# Patient Record
Sex: FEMALE | Race: WHITE | NOT HISPANIC OR LATINO | Employment: FULL TIME | URBAN - METROPOLITAN AREA
[De-identification: names, ages, dates, MRNs, and addresses within clinical notes are randomized per-mention and may not be internally consistent; named-entity substitution may affect disease eponyms.]

---

## 2018-02-19 DIAGNOSIS — Z32.01 PREGNANCY TEST POSITIVE: Primary | ICD-10-CM

## 2018-02-26 ENCOUNTER — NURSE TRIAGE (OUTPATIENT)
Dept: NURSING | Facility: CLINIC | Age: 23
End: 2018-02-26

## 2018-02-26 ENCOUNTER — HOSPITAL ENCOUNTER (EMERGENCY)
Facility: CLINIC | Age: 23
Discharge: HOME | End: 2018-02-26
Attending: EMERGENCY MEDICINE | Admitting: EMERGENCY MEDICINE
Payer: COMMERCIAL

## 2018-02-26 ENCOUNTER — APPOINTMENT (OUTPATIENT)
Dept: ULTRASOUND IMAGING | Facility: CLINIC | Age: 23
End: 2018-02-26
Attending: EMERGENCY MEDICINE
Payer: COMMERCIAL

## 2018-02-26 VITALS
DIASTOLIC BLOOD PRESSURE: 76 MMHG | TEMPERATURE: 98.2 F | RESPIRATION RATE: 12 BRPM | OXYGEN SATURATION: 98 % | HEART RATE: 100 BPM | SYSTOLIC BLOOD PRESSURE: 134 MMHG

## 2018-02-26 DIAGNOSIS — R10.32 ABDOMINAL PAIN, LEFT LOWER QUADRANT: ICD-10-CM

## 2018-02-26 DIAGNOSIS — Z34.91 INTRAUTERINE NORMAL PREGNANCY, FIRST TRIMESTER: ICD-10-CM

## 2018-02-26 DIAGNOSIS — R10.2 SUPRAPUBIC PAIN: Primary | ICD-10-CM

## 2018-02-26 LAB
ALBUMIN UR-MCNC: NEGATIVE MG/DL
APPEARANCE UR: CLEAR
BILIRUB UR QL STRIP: NEGATIVE
COLOR UR AUTO: YELLOW
GLUCOSE UR STRIP-MCNC: NEGATIVE MG/DL
HCG UR QL: POSITIVE
HGB UR QL STRIP: NEGATIVE
KETONES UR STRIP-MCNC: NEGATIVE MG/DL
LEUKOCYTE ESTERASE UR QL STRIP: NEGATIVE
MUCOUS THREADS #/AREA URNS LPF: PRESENT /LPF
NITRATE UR QL: NEGATIVE
PH UR STRIP: 7 PH (ref 5–7)
RBC #/AREA URNS AUTO: 1 /HPF (ref 0–2)
SOURCE: ABNORMAL
SP GR UR STRIP: 1.01 (ref 1–1.03)
SQUAMOUS #/AREA URNS AUTO: 19 /HPF (ref 0–1)
UROBILINOGEN UR STRIP-MCNC: 0 MG/DL (ref 0–2)
WBC #/AREA URNS AUTO: 1 /HPF (ref 0–2)

## 2018-02-26 PROCEDURE — 25000125 ZZHC RX 250: Performed by: EMERGENCY MEDICINE

## 2018-02-26 PROCEDURE — 81001 URINALYSIS AUTO W/SCOPE: CPT | Performed by: EMERGENCY MEDICINE

## 2018-02-26 PROCEDURE — 99284 EMERGENCY DEPT VISIT MOD MDM: CPT | Mod: 25

## 2018-02-26 PROCEDURE — 76801 OB US < 14 WKS SINGLE FETUS: CPT

## 2018-02-26 PROCEDURE — 81025 URINE PREGNANCY TEST: CPT | Performed by: EMERGENCY MEDICINE

## 2018-02-26 PROCEDURE — 25000132 ZZH RX MED GY IP 250 OP 250 PS 637: Performed by: EMERGENCY MEDICINE

## 2018-02-26 RX ORDER — ACETAMINOPHEN 325 MG/1
650 TABLET ORAL ONCE
Status: COMPLETED | OUTPATIENT
Start: 2018-02-26 | End: 2018-02-26

## 2018-02-26 RX ORDER — ONDANSETRON 4 MG/1
4 TABLET, ORALLY DISINTEGRATING ORAL ONCE
Status: COMPLETED | OUTPATIENT
Start: 2018-02-26 | End: 2018-02-26

## 2018-02-26 RX ORDER — ONDANSETRON 4 MG/1
4 TABLET, ORALLY DISINTEGRATING ORAL EVERY 8 HOURS PRN
Qty: 10 TABLET | Refills: 0 | Status: SHIPPED | OUTPATIENT
Start: 2018-02-26 | End: 2018-03-02

## 2018-02-26 RX ADMIN — ACETAMINOPHEN 650 MG: 325 TABLET, FILM COATED ORAL at 08:55

## 2018-02-26 RX ADMIN — ONDANSETRON 4 MG: 4 TABLET, ORALLY DISINTEGRATING ORAL at 08:55

## 2018-02-26 ASSESSMENT — ENCOUNTER SYMPTOMS
NAUSEA: 1
APPETITE CHANGE: 0
FEVER: 0
VOMITING: 1
ABDOMINAL PAIN: 1
DYSURIA: 0

## 2018-02-26 NOTE — TELEPHONE ENCOUNTER
"Echo 8 weeks and 2 days pregnant, history of right sided ovarian cysts.  For past 2 days, having left sided abdominal pain which feels similar to previous ovarian cysts.  Pain persistent, located \"right above hip bone\" and described as \"stabbing\" pain but has remained persistent over past two days.  Pain present for 20-30 minutes, and is usually a 10 minute reprieve of pain.  Nothing brings it on, and feels slightly better with her warm hand applied to area.  Has not yet had 1st OB appointment, though is scheduled.      Reason for Disposition    MODERATE-SEVERE abdominal pain (e.g., interferes with normal activities, awakens from sleep)    Protocols used: PREGNANCY - ABDOMINAL PAIN LESS THAN 20 WEEKS EGA-ADULT-    "

## 2018-02-26 NOTE — ED PROVIDER NOTES
"  History     Chief Complaint:  Abdominal Pain    HPI   Echo Mcgovern is a  pregnant 22 year old female who presents with constant left abdominal pain that started 2 nights ago. The patient is around 8 weeks pregnant based on her last period. This is her second pregnancy with no history of prior miscarriages or abortions. The patient reports that her side pain feels \"exactly the same\" to previous ovarian cysts. Associated symptoms include nausea and vomiting. The patient denies history of kidney stones as well as recent vaginal bleeding or discharge, dysuria, appetite change, fevers and other injuries.    Allergies:  No known drug allergies.     Medications:    The patient is not currently taking any prescribed medications.    Past Medical History:    The patient denies any significant past medical history.    Past Surgical History:    The patient does not have any pertinent past surgical history.    Family History:    No past pertinent family history.    Social History:  No past pertinent social history.  Marital Status:        Review of Systems   Constitutional: Negative for appetite change and fever.   Gastrointestinal: Positive for abdominal pain, nausea and vomiting.   Genitourinary: Negative for dysuria, vaginal bleeding and vaginal discharge.   All other systems reviewed and are negative.      Physical Exam     Patient Vitals for the past 24 hrs:   BP Temp Temp src Pulse Heart Rate Resp SpO2   18 1045 134/76 - - - 95 12 98 %   18 0836 140/85 98.2  F (36.8  C) Oral 100 - 20 99 %       Physical Exam  General: Alert, appears well-developed and well-nourished. Cooperative.     In mild distress  HEENT:  Head:  Atraumatic  Ears:  External ears are normal  Mouth/Throat:  Oropharynx is without erythema or exudate and mucous membranes are moist.   Eyes:   Conjunctivae normal and EOM are normal. No scleral icterus.  CV:  Normal rate, regular rhythm, normal heart sounds and radial pulses are 2+ " and symmetric.  No murmur.  Resp:  Breath sounds are clear bilaterally    Non-labored, no retractions or accessory muscle use  GI:  Abdomen is soft, no distension, left sided suprapubic tenderness.No rebound or guarding.  No CVA tenderness bilaterally  MS:  Normal range of motion. No edema.    Normal strength in all 4 extremities.     Back atraumatic.    No midline cervical, thoracic, or lumbar tenderness  Skin:  Warm and dry.  No rash or lesions noted.  Neuro: Alert. Normal strength.  GCS: 15  Psych:  Normal mood and affect.      Emergency Department Course     Imaging:  US OB <14 Weeks Single  1. Single live intrauterine pregnancy of estimated gestational age 8 weeks 5 days.  2. No cause for left-sided pain is identified.  As per radiology.     Laboratory:  UA with micro: squamous epithelial 19 (H), mucous present o/w negative  HCG Qualitative Urine: Positive    Interventions:  0855 Tylenol 650 mg PO   Zofran 4 mg PO    Emergency Department Course:  Nursing notes and vitals reviewed.  0835 I performed an exam of the patient as documented above.  Oral medications were administered as documented above.  The patient provided a urine sample here in the emergency department. This was sent for laboratory testing, findings above.   The patient was sent for a OB US while in the emergency department, findings above.     1018 I reevaluated the patient and provided an update in regards to her ED course.      I personally reviewed the laboratory results with the patient and answered all related questions prior to discharge.   Findings and plan explained to the patient. Patient discharged home with instructions regarding supportive care, medications, and reasons to return. The importance of close follow-up was reviewed.       Impression & Plan      Medical Decision Making:  Echo Mcgovern is a 22 year old female  who presents with left lower quadrant abdominal pain since Saturday. The patient is with no vaginal bleeding  or discharge, no pain with urination and no history of kidney stones. The patient's urine appears negative for UTI or pyelonephritis. UPT is positive. She is approximately 8 weeks based on her last menstrual period. We did obtain an ultrasound to evaluate intrauterine pregnancy. There is a single live intrauterine pregnancy estimated at 8 weeks and 5 days. There is no evidence of torsions or cyst of left ovary.  No clear etiology for left sided abd pain. I suspect this is likely discomfort persistent with her pregnancy.  No obvious free fluid concerning for other sinister intra-abdominal pathology at this time. The patient will follow up with OB GYN for her initial visit. She was counseled to continue with prenatal vitamins. She was given a prescription of Zofran as needed. She was tolerating oral intake prior to discharge. She felt comfortable with her plan for outpatient follow up. We did not perform laboratory work up at this time as she does appear to be doing well and was reassured by ultrasound findings here.  Tylenol alone improved the pain as well as zofran. There is low suspicion for other sinister intraabdominal pathology such as appendicitis and cholecystitis at this time. The patient felt comfortable for discharge and all questions were answered. Return precautions were given.     Diagnosis:    ICD-10-CM    1. Suprapubic pain R10.2 UA with Microscopic     HCG qualitative urine (UPT)   2. Abdominal pain, left lower quadrant R10.32    3. Intrauterine normal pregnancy, first trimester Z34.91        Disposition:  Discharged.    Discharge Medications:  Discharge Medication List as of 2/26/2018 10:42 AM      START taking these medications    Details   ondansetron (ZOFRAN ODT) 4 MG ODT tab Take 1 tablet (4 mg) by mouth every 8 hours as needed for nausea, Disp-10 tablet, R-0, Local Print           Scribe Discloser:  Jack GORDON, am serving as a scribe on 2/26/2018 at 8:35 AM to personally document services  performed by Matthew Brito MD based on my observations and the provider's statements to me.      2/26/2018   [facility] EMERGENCY DEPARTMENT       Matthew Brito MD  02/26/18 [id number]

## 2018-02-26 NOTE — ED AVS SNAPSHOT
[facility] Emergency Department    [address]    [facility] [address]    Phone:  [phone]    Fax:  [phone]                                       Echo Mcgovern   MRN: [id number]    Department:  [facility] Emergency Department   Date of Visit:  2/26/2018           After Visit Summary Signature Page     I have received my discharge instructions, and my questions have been answered. I have discussed any challenges I see with this plan with the nurse or doctor.    ..........................................................................................................................................  Patient/Patient Representative Signature      ..........................................................................................................................................  Patient Representative Print Name and Relationship to Patient    ..................................................               ................................................  Date                                            Time    ..........................................................................................................................................  Reviewed by Signature/Title    ...................................................              ..............................................  Date                                                            Time

## 2018-02-26 NOTE — ED AVS SNAPSHOT
[facility] Emergency Department    [address]    [facility] [address]    Phone:  [phone]    Fax:  [phone]                                       Echo Mcgovern   MRN: [id number]    Department:  [facility] Emergency Department   Date of Visit:  2/26/2018           Patient Information     Date Of Birth          1995        Your diagnoses for this visit were:     Suprapubic pain     Abdominal pain, left lower quadrant     Intrauterine normal pregnancy, first trimester        You were seen by Matthew Brito MD.      Follow-up Information     Go to Your OBGYN.    Why:  later this week at already scheduled appointment        Follow up with [facility] Emergency Department.    Specialty:  EMERGENCY MEDICINE    Why:  If symptoms worsen    Contact information:    [address]  [facility] [id number] [phone]        Discharge Instructions       Discharge Instructions  Abdominal Pain    Abdominal pain (belly pain) can be caused by many things. Your evaluation today does not show the exact cause for your pain. Your provider today has decided that it is unlikely your pain is due to a life threatening problem, or a problem requiring surgery or hospital admission. Sometimes those problems cannot be found right away, so it is very important that you follow up as directed.  Sometimes only the changes which occur over time allow the cause of your pain to be found.    Generally, every Emergency Department visit should have a follow-up clinic visit with either a primary or a specialty clinic/provider. Please follow-up as instructed by your emergency provider today. With abdominal pain, we often recommend very close follow-up, such as the following day.    ADULTS:  Return to the Emergency Department right away if:      You get an oral temperature above 102oF or as directed by your provider.    You have blood in your stools. This may be bright red  or appear as black, tarry stools.      You keep vomiting (throwing up) or cannot drink liquids.    You see blood when you vomit.     You cannot have a bowel movement or you cannot pass gas.    Your stomach gets bloated or bigger.    Your skin or the whites of your eyes look yellow.    You faint.    You have bloody, frequent or painful urination (peeing).    You have new symptoms or anything that worries you.    CHILDREN:  Return to the Emergency Department right away if your child has any of the above-listed symptoms or the following:      Pushes your hand away or screams/cries when his/her belly is touched.    You notice your child is very fussy or weak.    Your child is very tired and is too tired to eat or drink.    Your child is dehydrated.  Signs of dehydration can be:  o Significant change in the amount of wet diapers/urine.  o Your infant or child starts to have dry mouth and lips, or no saliva (spit) or tears.    PREGNANT WOMEN:  Return to the Emergency Department right away if you have any of the above-listed symptoms or the following:      You have bleeding, leaking fluid or passing tissue from the vagina.    You have worse pain or cramping, or pain in your shoulder or back.    You have vomiting that will not stop.    You have a temperature of 100oF or more.    Your baby is not moving as much as usual.    You faint.    You get a bad headache with or without eye problems and abdominal pain.    You have a seizure.    You have unusual discharge from your vagina and abdominal pain.    Abdominal pain is pretty common during pregnancy.  Your pain may or may not be related to your pregnancy. You should follow-up closely with your OB provider so they can evaluate you and your baby.  Until you follow-up with your regular provider, do the following:       Avoid sex and do not put anything in your vagina.    Drink clear fluids.    Only take medications approved by your provider.    MORE INFORMATION:    Appendicitis:   "A possible cause of abdominal pain in any person who still has their appendix is acute appendicitis. Appendicitis is often hard to diagnose.  Testing does not always rule out early appendicitis or other causes of abdominal pain. Close follow-up with your provider and re-evaluations may be needed to figure out the reason for your abdominal pain.    Follow-up:  It is very important that you make an appointment with your clinic and go to the appointment.  If you do not follow-up with your primary provider, it may result in missing an important development which could result in permanent injury or disability and/or lasting pain.  If there is any problem keeping your appointment, call your provider or return to the Emergency Department.    Medications:  Take your medications as directed by your provider today.  Before using over-the-counter medications, ask your provider and make sure to take the medications as directed.  If you have any questions about medications, ask your provider.    Diet:  Resume your normal diet as much as possible, but do not eat fried, fatty or spicy foods while you have pain.  Do not drink alcohol or have caffeine.  Do not smoke tobacco.    Probiotics: If you have been given an antibiotic, you may want to also take a probiotic pill or eat yogurt with live cultures. Probiotics have \"good bacteria\" to help your intestines stay healthy. Studies have shown that probiotics help prevent diarrhea (loose stools) and other intestine problems (including C. diff infection) when you take antibiotics. You can buy these without a prescription in the pharmacy section of the store.     If you were given a prescription for medicine here today, be sure to read all of the information (including the package insert) that comes with your prescription.  This will include important information about the medicine, its side effects, and any warnings that you need to know about.  The pharmacist who fills the prescription " can provide more information and answer questions you may have about the medicine.  If you have questions or concerns that the pharmacist cannot address, please call or return to the Emergency Department.       Remember that you can always come back to the Emergency Department if you are not able to see your regular provider in the amount of time listed above, if you get any new symptoms, or if there is anything that worries you.      Future Appointments        Provider Department Dept Phone Center    3/1/2018 9:00 AM [facility] [phone] [place]    3/2/2018 2:00 PM Jeri Vergara,  [facility] [phone] [place]      24 Hour Appointment Hotline       To make an appointment at any [facility], call [phone] ([phone]). If you don't have a family doctor or clinic, we will help you find one. [facility] are conveniently located to serve the needs of you and your family.             Review of your medicines      START taking        Dose / Directions Last dose taken    ondansetron 4 MG ODT tab   Commonly known as:  ZOFRAN ODT   Dose:  4 mg   Quantity:  10 tablet        Take 1 tablet (4 mg) by mouth every 8 hours as needed for nausea   Refills:  0                Prescriptions were sent or printed at these locations (1 Prescription)                   Other Prescriptions                Printed at Department/Unit printer (1 of 1)         ondansetron (ZOFRAN ODT) 4 MG ODT tab                Procedures and tests performed during your visit     HCG qualitative urine (UPT)    UA with Microscopic    US OB < 14 Weeks Single      Orders Needing Specimen Collection     None      Pending Results     No orders found from 2/24/2018 to 2/27/2018.            Pending Culture Results     No orders found from 2/24/2018 to 2/27/2018.            Pending Results Instructions     If you had any lab results that were not finalized at the time of your Discharge, you can  call the ED Lab Result RN at [phone]. You will be contacted by this team for any positive Lab results or changes in treatment. The nurses are available 7 days a week from 10A to 6:30P.  You can leave a message 24 hours per day and they will return your call.        Test Results From Your Hospital Stay        2/26/2018  9:53 AM      Component Results     Component Value Ref Range & Units Status    Color Urine Yellow  Final    Appearance Urine Clear  Final    Glucose Urine Negative NEG^Negative mg/dL Final    Bilirubin Urine Negative NEG^Negative Final    Ketones Urine Negative NEG^Negative mg/dL Final    Specific Gravity Urine 1.008 1.003 - 1.035 Final    Blood Urine Negative NEG^Negative Final    pH Urine 7.0 5.0 - 7.0 pH Final    Protein Albumin Urine Negative NEG^Negative mg/dL Final    Urobilinogen mg/dL 0.0 0.0 - 2.0 mg/dL Final    Nitrite Urine Negative NEG^Negative Final    Leukocyte Esterase Urine Negative NEG^Negative Final    Source Midstream Urine  Final    WBC Urine 1 0 - 2 /HPF Final    RBC Urine 1 0 - 2 /HPF Final    Squamous Epithelial /HPF Urine 19 (H) 0 - 1 /HPF Final    Mucous Urine Present (A) NEG^Negative /LPF Final         2/26/2018  9:53 AM      Component Results     Component Value Ref Range & Units Status    HCG Qual Urine Positive (A) NEG^Negative Final    This test is for screening purposes.  Results should be interpreted along with   the clinical picture.  Confirmation testing is available if warranted by   ordering [id number], HCG Quantitative Pregnancy.                 2/26/2018  9:45 AM      Narrative     ULTRASOUND OB LESS THAN 14 WEEKS SINGLE  2/26/2018 9:25 AM    HISTORY: Left suprapubic pain during early pregnancy.    TECHNIQUE: Transabdominal imaging only was performed.    COMPARISON:  None.    FINDINGS:      Estimated gestational age by current ultrasound measurement: 8 weeks 5  days.  Estimated date of delivery based on this ultrasound: 10/3/2018.  Patient reported LMP:  12/28/2017.  Estimated gestational age by reported LMP: 8 weeks 4 days.    Crown-rump length: 2.1 cm.   Embryonic cardiac activity: 190 bpm.   Yolk sac: Present.  Subchorionic hemorrhage: None.    Right ovary: Unremarkable.  Left ovary: Unremarkable.  Adnexal mass: None.  Free pelvic fluid: None.    Additional ultrasound scanning through the region of the patient's  pain to the left of the umbilicus demonstrates no sonographic  abnormalities.        Impression     IMPRESSION:   1. Single live intrauterine pregnancy of estimated gestational age 8  weeks 5 days.  2. No cause for left-sided pain is identified.    GENEVIEVE AQUINO MD                Clinical Quality Measure: Blood Pressure Screening     Your blood pressure was checked while you were in the emergency department today. The last reading we obtained was  BP: 140/85 . Please read the guidelines below about what these numbers mean and what you should do about them.  If your systolic blood pressure (the top number) is less than 120 and your diastolic blood pressure (the bottom number) is less than 80, then your blood pressure is normal. There is nothing more that you need to do about it.  If your systolic blood pressure (the top number) is 120-139 or your diastolic blood pressure (the bottom number) is 80-89, your blood pressure may be higher than it should be. You should have your blood pressure rechecked within a year by a primary care provider.  If your systolic blood pressure (the top number) is 140 or greater or your diastolic blood pressure (the bottom number) is 90 or greater, you may have high blood pressure. High blood pressure is treatable, but if left untreated over time it can put you at risk for heart attack, stroke, or kidney failure. You should have your blood pressure rechecked by a primary care provider within the next 4 weeks.  If your provider in the emergency department today gave you specific instructions to follow-up with your doctor or  "provider even sooner than that, you should follow that instruction and not wait for up to 4 weeks for your follow-up visit.        Thank you for choosing [facility]       Thank you for choosing [facility] for your care. Our goal is always to provide you with excellent care. Hearing back from our patients is one way we can continue to improve our services. Please take a few minutes to complete the written survey that you may receive in the mail after you visit with us. Thank you!        [email or web address] Information     [employer] lets you send messages to your doctor, view your test results, renew your prescriptions, schedule appointments and more. To sign up, go to [email or web address] . Click on \"Log in\" on the left side of the screen, which will take you to the Welcome page. Then click on \"Sign up Now\" on the right side of the page.     You will be asked to enter the access code listed below, as well as some personal information. Please follow the directions to create your username and password.     Your access code is: [email or web address]  Expires: 2018 10:42 AM     Your access code will  in 90 days. If you need help or a new code, please call your [facility] clinic or [phone].        Care EveryWhere ID     This is your Care EveryWhere ID. This could be used by other organizations to access your [facility] medical records  [id number]        Equal Access to Services     AISSATOU OATES AH: Hadii miranda Cruz, waaxda lumaria fernanda, qaybta kaalcyndi roberto. So [facility] [phone].    ATENCIÓN: Si habla español, tiene a pickering disposición servicios gratuitos de asistencia lingüística. Susie al [phone].    We comply with applicable federal civil rights laws and [place] laws. We do not discriminate on the basis of race, color, national origin, age, disability, sex, sexual orientation, or gender identity.            After Visit Summary       This is your record. Keep this with " you and show to your community pharmacist(s) and doctor(s) at your next visit.

## 2018-03-01 ENCOUNTER — HOSPITAL ENCOUNTER (OUTPATIENT)
Dept: ULTRASOUND IMAGING | Facility: CLINIC | Age: 23
Discharge: HOME | End: 2018-03-01
Attending: OBSTETRICS & GYNECOLOGY | Admitting: OBSTETRICS & GYNECOLOGY
Payer: COMMERCIAL

## 2018-03-01 ENCOUNTER — PRENATAL OFFICE VISIT (OUTPATIENT)
Dept: NURSING | Facility: CLINIC | Age: 23
End: 2018-03-01
Payer: COMMERCIAL

## 2018-03-01 DIAGNOSIS — Z32.01 PREGNANCY TEST POSITIVE: ICD-10-CM

## 2018-03-01 DIAGNOSIS — Z32.01 PREGNANCY TEST POSITIVE: Primary | ICD-10-CM

## 2018-03-01 LAB
ABO + RH BLD: NORMAL
ABO + RH BLD: NORMAL
BETA HCG QUAL IFA URINE: POSITIVE
BLD GP AB SCN SERPL QL: NORMAL
BLOOD BANK CMNT PATIENT-IMP: NORMAL
ERYTHROCYTE [DISTWIDTH] IN BLOOD BY AUTOMATED COUNT: 12.6 % (ref 10–15)
HCT VFR BLD AUTO: 39.1 % (ref 35–47)
HGB BLD-MCNC: 13.1 G/DL (ref 11.7–15.7)
MCH RBC QN AUTO: 31.1 PG (ref 26.5–33)
MCHC RBC AUTO-ENTMCNC: 33.5 G/DL (ref 31.5–36.5)
MCV RBC AUTO: 93 FL (ref 78–100)
PLATELET # BLD AUTO: 225 10E9/L (ref 150–450)
RBC # BLD AUTO: 4.21 10E12/L (ref 3.8–5.2)
SPECIMEN EXP DATE BLD: NORMAL
WBC # BLD AUTO: 7.4 10E9/L (ref 4–11)

## 2018-03-01 PROCEDURE — 99207 ZZC NO CHARGE NURSE ONLY: CPT

## 2018-03-01 PROCEDURE — 76801 OB US < 14 WKS SINGLE FETUS: CPT

## 2018-03-01 PROCEDURE — 87389 HIV-1 AG W/HIV-1&-2 AB AG IA: CPT | Performed by: OBSTETRICS & GYNECOLOGY

## 2018-03-01 PROCEDURE — 85027 COMPLETE CBC AUTOMATED: CPT | Performed by: OBSTETRICS & GYNECOLOGY

## 2018-03-01 PROCEDURE — 84703 CHORIONIC GONADOTROPIN ASSAY: CPT | Performed by: OBSTETRICS & GYNECOLOGY

## 2018-03-01 PROCEDURE — 86762 RUBELLA ANTIBODY: CPT | Performed by: OBSTETRICS & GYNECOLOGY

## 2018-03-01 PROCEDURE — 86780 TREPONEMA PALLIDUM: CPT | Performed by: OBSTETRICS & GYNECOLOGY

## 2018-03-01 PROCEDURE — 86900 BLOOD TYPING SEROLOGIC ABO: CPT | Performed by: OBSTETRICS & GYNECOLOGY

## 2018-03-01 PROCEDURE — 87340 HEPATITIS B SURFACE AG IA: CPT | Performed by: OBSTETRICS & GYNECOLOGY

## 2018-03-01 PROCEDURE — 86901 BLOOD TYPING SEROLOGIC RH(D): CPT | Performed by: OBSTETRICS & GYNECOLOGY

## 2018-03-01 PROCEDURE — 86850 RBC ANTIBODY SCREEN: CPT | Performed by: OBSTETRICS & GYNECOLOGY

## 2018-03-01 PROCEDURE — 36415 COLL VENOUS BLD VENIPUNCTURE: CPT | Performed by: OBSTETRICS & GYNECOLOGY

## 2018-03-01 PROCEDURE — 87086 URINE CULTURE/COLONY COUNT: CPT | Performed by: OBSTETRICS & GYNECOLOGY

## 2018-03-01 RX ORDER — PRENATAL VIT/IRON FUM/FOLIC AC 27MG-0.8MG
1 TABLET ORAL DAILY
Qty: 100 TABLET | Refills: 3 | COMMUNITY
Start: 2018-03-01 | End: 2018-11-27

## 2018-03-01 NOTE — MR AVS SNAPSHOT
After Visit Summary   3/1/2018    Echo Mcgovern    MRN: [id number]           Patient Information     Date Of Birth          1995        Visit Information        Provider Department      3/1/2018 9:00 AM RI PRENATAL NURSE [facility]        Today's Diagnoses     Pregnancy test positive    -  1       Follow-ups after your visit        Your next 10 appointments already scheduled     Mar 01, 2018  3:45 PM CST   US OB < 14 WEEKS WITH TRANSVAGINAL SINGLE with [id number]   [facility] ([facility])    [address]  [facility] [id number] [phone]           Please bring a list of your medicines (including vitamins, minerals and over-the-counter drugs). Also, tell your doctor about any allergies you may have. Wear comfortable clothes and leave your valuables at home.  If you re less than 20 weeks drink four 8-ounce glasses of fluid an hour before your exam. If you need to empty your bladder before your exam, try to release only a little urine. Then, drink another glass of fluid.  You may have up to two family members in the exam room. If you bring a small child, an adult must be there to care for him or her.  Please call the Imaging Department at your exam site with any questions.            Mar 02, 2018  2:00 PM CST   [facility] Prenatal with Jeri Vergara,    [facility]    [address] [place]  [facility] [id number] [phone]              Future tests that were ordered for you today     Open Future Orders        Priority Expected Expires Ordered    US OB <14 Weeks w Transvaginal Single Routine  3/1/2019 3/1/2018            Who to contact     If you have questions or need follow up information about today's clinic visit or your schedule please contact [facility] directly at [phone].  Normal or non-critical lab and imaging results will be  communicated to you by Interfoliohart, letter or phone within 4 business days after the clinic has received the results. If you do not hear from us within 7 days, please contact the clinic through [employer] or phone. If you have a critical or abnormal lab result, we will notify you by phone as soon as possible.  Submit refill requests through [employer] or call your pharmacy and they will forward the refill request to us. Please allow 3 business days for your refill to be completed.          Additional Information About Your Visit        Game [employer] Hockey Information     [employer] gives you secure access to your electronic health record. If you see a primary care provider, you can also send messages to your care team and make appointments. If you have questions, please call your primary care clinic.  If you do not have a primary care provider, please call [phone] and they will assist you.        Care EveryWhere ID     This is your Care EveryWhere ID. This could be used by other organizations to access your [place] medical records  [id number]        Your Vitals Were     Last Period                   12/29/2017            Blood Pressure from Last 3 Encounters:   02/26/18 134/76    Weight from Last 3 Encounters:   No data found for Wt              We Performed the Following     ABO/Rh type and screen     Anti Treponema     Beta HCG qual IFA urine - [facility] and [place]     CBC with platelets     Hepatitis B surface antigen     HIV Antigen Antibody Combo     Rubella Antibody IgG Quantitative     Urine Culture Aerobic Bacterial        Primary Care Provider Fax #    Physician No Ref-Primary [phone]       No address on file        Equal Access to Services     AISSATOU OATES : Hadii miranda Cruz, wachinoda luemmaadaha, qaybta kaalmada cyndi simms . So [facility] [phone].    ATENCIÓN: Si habla español, tiene a pickering disposición servicios gratuitos de asistencia lingüística. Llame al  [phone].    We comply with applicable federal civil rights laws and [place] laws. We do not discriminate on the basis of race, color, national origin, age, disability, sex, sexual orientation, or gender identity.            Thank you!     Thank you for choosing [facility]  for your care. Our goal is always to provide you with excellent care. Hearing back from our patients is one way we can continue to improve our services. Please take a few minutes to complete the written survey that you may receive in the mail after your visit with us. Thank you!             Your Updated Medication List - Protect others around you: Learn how to safely use, store and throw away your medicines at www.disposemymeds.org.          This list is accurate as of 3/1/18 11:44 AM.  Always use your most recent med list.                   Brand Name Dispense Instructions for use Diagnosis    ondansetron 4 MG ODT tab    ZOFRAN ODT    10 tablet    Take 1 tablet (4 mg) by mouth every 8 hours as needed for nausea        prenatal multivitamin plus iron 27-0.8 MG Tabs per tablet     100 tablet    Take 1 tablet by mouth daily

## 2018-03-01 NOTE — NURSING NOTE
NPN nurse visit. 1st dr visit scheduled for 3/2/18 with Jeri Vergara D.O. Pap due. Last pap unsure.  8w6d.   We received records from pts previous pregnancy. I will attach them to the questionnaires so they can be reviewed by the md. Please send them to be scanned into epic once we are done with them.       MADISON Huerta RN

## 2018-03-02 ENCOUNTER — PRENATAL OFFICE VISIT (OUTPATIENT)
Dept: OBGYN | Facility: CLINIC | Age: 23
End: 2018-03-02
Payer: COMMERCIAL

## 2018-03-02 VITALS
DIASTOLIC BLOOD PRESSURE: 62 MMHG | WEIGHT: 148.7 LBS | HEART RATE: 70 BPM | BODY MASS INDEX: 25.39 KG/M2 | HEIGHT: 64 IN | SYSTOLIC BLOOD PRESSURE: 110 MMHG

## 2018-03-02 DIAGNOSIS — Z34.81 ENCOUNTER FOR SUPERVISION OF OTHER NORMAL PREGNANCY IN FIRST TRIMESTER: ICD-10-CM

## 2018-03-02 DIAGNOSIS — Z11.3 SCREEN FOR STD (SEXUALLY TRANSMITTED DISEASE): ICD-10-CM

## 2018-03-02 DIAGNOSIS — O09.299 H/O PRE-ECLAMPSIA IN PRIOR PREGNANCY, CURRENTLY PREGNANT: Primary | ICD-10-CM

## 2018-03-02 LAB
BACTERIA SPEC CULT: NORMAL
HBV SURFACE AG SERPL QL IA: NONREACTIVE
HIV 1+2 AB+HIV1 P24 AG SERPL QL IA: NONREACTIVE
RUBV IGG SERPL IA-ACNC: 21 IU/ML
SPECIMEN SOURCE: NORMAL
T PALLIDUM IGG+IGM SER QL: NEGATIVE

## 2018-03-02 PROCEDURE — 99207 ZZC FIRST OB VISIT: CPT | Performed by: OBSTETRICS & GYNECOLOGY

## 2018-03-02 NOTE — PROGRESS NOTES
"Initial Obstetrics Visit    Subjective: 22 year old  at 9w0d dated by LMP confirmed by early ultrasound (8 weeks) here today for initial OB visit. Patient reports No Problems. Denies cramping and vaginal spotting.     OB History:   H/o IOL for preeclampsia @ 37 weeks. Declines h/o treatment with magnesium. Records reviewed. Vaginal delivery, Apgars 8 & 9, 5lb 8oz.    Gyn History:   Menses: LMP: Patient's last menstrual period was 2017. frequency: q month  Sexually transmitted disease history: none.    Exercise: previous weight lifting, no longer lifting but exercising regularly   Diet: well rounded  Immunizations: Received flu   H/o Chicken Pox or Varicella Vaccination: Yes    Past Medical History:   Diagnosis Date     H/O pre-eclampsia in prior pregnancy, currently pregnant      Past Surgical History:   Procedure Laterality Date     HAND SURGERY  2015    right hand- fractured hand, had 2 screws placed     KNEE SURGERY  2011    Torn ACL, Left knee     family history is not on file.  Social History     Social History     Marital status:      Spouse name: Ministerio     Number of children: 1     Years of education: N/A     Occupational History     retail      Social History Main Topics     Smoking status: Never Smoker     Smokeless tobacco: Never Used     Alcohol use No     Drug use: No     Sexual activity: Yes     Partners: Male     Other Topics Concern     Not on file     Social History Narrative     Review of patient's allergies indicates not on file.    Current Outpatient Prescriptions on File Prior to Visit:  Prenatal Vit-Fe Fumarate-FA (PRENATAL MULTIVITAMIN PLUS IRON) 27-0.8 MG TABS per tablet Take 1 tablet by mouth daily     No current facility-administered medications on file prior to visit.      Review Of Systems: Negative except as mentioned in HPI    Exam:  Vitals: /62 (BP Location: Left arm, Patient Position: Chair, Cuff Size: Adult Regular)  Pulse 70  Ht 5' 4\" (1.626 m)  Wt " 148 lb 11.2 oz (67.4 kg)  LMP 2017  BMI 25.52 kg/m2  BMI= Body mass index is 25.52 kg/(m^2).    Constitutional: Healthy appearing  female. No acute distress.  HEENT: Normal appearance. Neck supple, thyroid normal in size without nodularity or masses.  Cardiovascular: Regular rate and rhythm without murmurs, clicks, gallops or rub.  Respiratory: Clear to auscultation bilaterally without crackles or wheeze.  Breast Exam: No visible masses or suspicious skin changes.  No discrete or dominant masses to palpation.  No axillary lymphadenopathy.  Gastrointestinal: Abdomen soft, non-tender. BS normal. No masses or organomegaly.  Pelvic Exam -    Vulva: Normal genitalia   Vagina: Normal mucosa, no abnormal discharge   Cervix: Parous, closed, mobile, no discharge, GC/Chlamydia obtained   Uterus: mildly enlarged, Normal shape, position and consistency   Adnexa: No masses, nodularity, tenderness  Skin: No suspicious lesions or rashes  Psychiatric: Mentation appears normal and affect normal      Lab Results   Component Value Date    HGB 13.1 2018       Recent Labs   Lab Test  18   0949   ABO  A   RH  Pos   AS  Neg     Rhogam not indicated     Recent Labs   Lab Test  18   0949   HEPBANG  Nonreactive   HIAGAB  Nonreactive   RUQIGG  21         Assessment: 22 year old  at 9w0d here today for initial prenatal visit. Doing well overall.       1. Encounter for supervision of other normal pregnancy in first trimester  - received flu vaccination (through ), Tdap @ 27 weeks  - desires Progenity testing with genetic panel next visit    2. H/O pre-eclampsia (mild) in prior pregnancy, currently pregnant  - Previous IOL @ 37 weeks, [place]. Treated with Procardia 30mg daily post partum x 1 week.   - Start ASA 81mg @ 12 weeks, continue through 36 weeks  - Obtain baseline labs future visit    3. Screen for STD (sexually transmitted disease)  - NEISSERIA GONORRHOEA PCR  - CHLAMYDIA TRACHOMATIS  PCR    Additional Plan:  - Labs: Prenatal labs reviewed. GC/Chlam collected.  Will obtain records. Negative per patient, 2/2016.    - Prenatal testing: Discussed options for screening for and diagnosis of chromosomal anomalies, including first trimester screen, noninvasive prenatal testing/cell-free fetal DNA testing, CVS/amniocentesis, quad screen, and ultrasound or comprehensive Level II US at 18-20 weeks. She is electing cell-free DNA testing.    - Education: Reviewed early pregnancy education, diet, exercise, prenatal vitamins, intercourse. Reviewed the call schedule, labor and delivery, and the schedule of prenatal visits. We also discussed: Vitamins / Minerals (see the F[facility] book, page: 7); Exercise, diet.    - Other/Follow-up: Follow up in 4 weeks.  Normal exercise.  Bedrest.  Normal sexual activity.  Prenatal vitamins..         Jeri Vergara, DO  Obstetrics and Gynecology

## 2018-03-02 NOTE — MR AVS SNAPSHOT
After Visit Summary   3/2/2018    Echo Mcgovern    MRN: [id number]           Patient Information     Date Of Birth          1995        Visit Information        Provider Department      3/2/2018 2:00 PM Jeri Vergara DO [facility]        Today's Diagnoses     H/O pre-eclampsia in prior pregnancy, currently pregnant    -  1    Encounter for supervision of other normal pregnancy in first trimester        Screen for STD (sexually transmitted disease)           Follow-ups after your visit        Follow-up notes from your care team     Return in about 4 weeks (around 3/30/2018).      Future tests that were ordered for you today     Open Future Orders        Priority Expected Expires Ordered    US OB <14 Weeks w Transvaginal Single Routine  3/1/2019 3/1/2018            Who to contact     If you have questions or need follow up information about today's clinic visit or your schedule please contact [facility] directly at [phone].  Normal or non-critical lab and imaging results will be communicated to you by MyChart, letter or phone within 4 business days after the clinic has received the results. If you do not hear from us within 7 days, please contact the clinic through Biodelhart or phone. If you have a critical or abnormal lab result, we will notify you by phone as soon as possible.  Submit refill requests through [employer] or call your pharmacy and they will forward the refill request to us. Please allow 3 business days for your refill to be completed.          Additional Information About Your Visit        MyChart Information     [employer] gives you secure access to your electronic health record. If you see a primary care provider, you can also send messages to your care team and make appointments. If you have questions, please call your primary care clinic.  If you do not have a primary care provider, please call [phone] and they will assist you.       "  Care EveryWhere ID     This is your Care EveryWhere ID. This could be used by other organizations to access your [facility] medical records  [id number]        Your Vitals Were     Pulse Height Last Period BMI (Body Mass Index)          70 5' 4\" (1.626 m) 12/29/2017 25.52 kg/m2         Blood Pressure from Last 3 Encounters:   03/02/18 110/62   02/26/18 134/76    Weight from Last 3 Encounters:   03/02/18 148 lb 11.2 oz (67.4 kg)              We Performed the Following     CHLAMYDIA TRACHOMATIS PCR     NEISSERIA GONORRHOEA PCR        Primary Care Provider Fax #    Physician No Ref-Primary [phone]       No address on file        Equal Access to Services     AISSATOU OATES : Jess Cruz, felicitas medellin, nicholas kaalmavon simms, cyndi shelton . So [facility] [phone].    ATENCIÓN: Si habla español, tiene a pickering disposición servicios gratuitos de asistencia lingüística. Llame al [phone].    We comply with applicable federal civil rights laws and [place] laws. We do not discriminate on the basis of race, color, national origin, age, disability, sex, sexual orientation, or gender identity.            Thank you!     Thank you for choosing [facility]  for your care. Our goal is always to provide you with excellent care. Hearing back from our patients is one way we can continue to improve our services. Please take a few minutes to complete the written survey that you may receive in the mail after your visit with us. Thank you!             Your Updated Medication List - Protect others around you: Learn how to safely use, store and throw away your medicines at www.disposemymeds.org.          This list is accurate as of 3/2/18  2:45 PM.  Always use your most recent med list.                   Brand Name Dispense Instructions for use Diagnosis    prenatal multivitamin plus iron 27-0.8 MG Tabs per tablet     100 tablet    Take 1 tablet by mouth daily          "

## 2018-03-02 NOTE — NURSING NOTE
"Chief Complaint   Patient presents with     Prenatal Care     Patient here for first OB visit, 9 weeks 0 days.        Initial /62 (BP Location: Left arm, Patient Position: Chair, Cuff Size: Adult Regular)  Pulse 70  Ht 5' 4\" (1.626 m)  Wt 148 lb 11.2 oz (67.4 kg)  LMP 12/29/2017  BMI 25.52 kg/m2 Estimated body mass index is 25.52 kg/(m^2) as calculated from the following:    Height as of this encounter: 5' 4\" (1.626 m).    Weight as of this encounter: 148 lb 11.2 oz (67.4 kg).  Medication Reconciliation: complete     Jose Crane CMA      "

## 2018-03-05 ENCOUNTER — HEALTH MAINTENANCE LETTER (OUTPATIENT)
Age: 23
End: 2018-03-05

## 2018-03-05 LAB
C TRACH DNA SPEC QL NAA+PROBE: ABNORMAL
N GONORRHOEA DNA SPEC QL NAA+PROBE: ABNORMAL
SPECIMEN SOURCE: ABNORMAL
SPECIMEN SOURCE: ABNORMAL

## 2018-03-16 ENCOUNTER — MYC MEDICAL ADVICE (OUTPATIENT)
Dept: OBGYN | Facility: CLINIC | Age: 23
End: 2018-03-16

## 2018-03-26 ENCOUNTER — PRENATAL OFFICE VISIT (OUTPATIENT)
Dept: OBGYN | Facility: CLINIC | Age: 23
End: 2018-03-26
Payer: COMMERCIAL

## 2018-03-26 VITALS — WEIGHT: 152 LBS | DIASTOLIC BLOOD PRESSURE: 70 MMHG | BODY MASS INDEX: 26.09 KG/M2 | SYSTOLIC BLOOD PRESSURE: 196 MMHG

## 2018-03-26 DIAGNOSIS — O09.299 H/O PRE-ECLAMPSIA IN PRIOR PREGNANCY, CURRENTLY PREGNANT: Primary | ICD-10-CM

## 2018-03-26 DIAGNOSIS — Z34.82 ENCOUNTER FOR SUPERVISION OF OTHER NORMAL PREGNANCY IN SECOND TRIMESTER: ICD-10-CM

## 2018-03-26 DIAGNOSIS — Z11.3 SCREEN FOR STD (SEXUALLY TRANSMITTED DISEASE): ICD-10-CM

## 2018-03-26 LAB
CREAT UR-MCNC: 17 MG/DL
ERYTHROCYTE [DISTWIDTH] IN BLOOD BY AUTOMATED COUNT: 12.6 % (ref 10–15)
HCT VFR BLD AUTO: 37.4 % (ref 35–47)
HGB BLD-MCNC: 12.5 G/DL (ref 11.7–15.7)
MCH RBC QN AUTO: 30.7 PG (ref 26.5–33)
MCHC RBC AUTO-ENTMCNC: 33.4 G/DL (ref 31.5–36.5)
MCV RBC AUTO: 92 FL (ref 78–100)
PLATELET # BLD AUTO: 190 10E9/L (ref 150–450)
PROT UR-MCNC: <0.05 G/L
PROT/CREAT 24H UR: NORMAL G/G CR (ref 0–0.2)
RBC # BLD AUTO: 4.07 10E12/L (ref 3.8–5.2)
WBC # BLD AUTO: 8.5 10E9/L (ref 4–11)

## 2018-03-26 PROCEDURE — 40000791 ZZHCL STATISTIC VERIFI PRENATAL TRISOMY 21,18,13: Mod: 90 | Performed by: OBSTETRICS & GYNECOLOGY

## 2018-03-26 PROCEDURE — 84156 ASSAY OF PROTEIN URINE: CPT | Performed by: OBSTETRICS & GYNECOLOGY

## 2018-03-26 PROCEDURE — 84460 ALANINE AMINO (ALT) (SGPT): CPT | Performed by: OBSTETRICS & GYNECOLOGY

## 2018-03-26 PROCEDURE — 87491 CHLMYD TRACH DNA AMP PROBE: CPT | Performed by: OBSTETRICS & GYNECOLOGY

## 2018-03-26 PROCEDURE — 36415 COLL VENOUS BLD VENIPUNCTURE: CPT | Performed by: OBSTETRICS & GYNECOLOGY

## 2018-03-26 PROCEDURE — 99207 ZZC PRENATAL VISIT: CPT | Performed by: OBSTETRICS & GYNECOLOGY

## 2018-03-26 PROCEDURE — 99000 SPECIMEN HANDLING OFFICE-LAB: CPT | Performed by: OBSTETRICS & GYNECOLOGY

## 2018-03-26 PROCEDURE — 84550 ASSAY OF BLOOD/URIC ACID: CPT | Performed by: OBSTETRICS & GYNECOLOGY

## 2018-03-26 PROCEDURE — 85027 COMPLETE CBC AUTOMATED: CPT | Performed by: OBSTETRICS & GYNECOLOGY

## 2018-03-26 PROCEDURE — 87591 N.GONORRHOEAE DNA AMP PROB: CPT | Performed by: OBSTETRICS & GYNECOLOGY

## 2018-03-26 PROCEDURE — 82565 ASSAY OF CREATININE: CPT | Performed by: OBSTETRICS & GYNECOLOGY

## 2018-03-26 PROCEDURE — 84450 TRANSFERASE (AST) (SGOT): CPT | Performed by: OBSTETRICS & GYNECOLOGY

## 2018-03-26 NOTE — PROGRESS NOTES
22 year old  at 12w3d weeks presents to the clinic for a routine prenatal visit.  Feeling well.  No vaginal bleeding, leakage of fluid, or contractions.    1. Encounter for supervision of other normal pregnancy in second trimester  - Flu vaccination received through , Tdap @ 27+ weeks  - Desires progenity testing today    2. Screen for STD (sexually transmitted disease)  - NEISSERIA GONORRHOEA PCR  - CHLAMYDIA TRACHOMATIS PCR    3. H/O pre-eclampsia in prior pregnancy, currently pregnant  - Previous IOL @ 37 weeks, [place]. Treated with Procardia 30mg daily post partum x 1 week.   - Start ASA 81mg, continue through 36 weeks  - Will obtain baseline labs today    RTC 4 weeks.    Jeri Vergara DO

## 2018-03-26 NOTE — MR AVS SNAPSHOT
After Visit Summary   3/26/2018    Echo Mcgovern    MRN: [id number]           Patient Information     Date Of Birth          1995        Visit Information        Provider Department      3/26/2018 11:15 AM Jeri Vergara DO [facility]        Today's Diagnoses     H/O pre-eclampsia in prior pregnancy, currently pregnant    -  1    Encounter for supervision of other normal pregnancy in second trimester        Screen for STD (sexually transmitted disease)           Follow-ups after your visit        Follow-up notes from your care team     Return in about 4 weeks (around 4/23/2018).      Your next 10 appointments already scheduled     Apr 27, 2018 11:30 AM CDT   ESTABLISHED PRENATAL with [facility]ara DO   [facility]    [address]  [facility] [address] [phone]              Who to contact     If you have questions or need follow up information about today's clinic visit or your schedule please contact [facility] directly at [phone].  Normal or non-critical lab and imaging results will be communicated to you by Nano3D Bioscienceshart, letter or phone within 4 business days after the clinic has received the results. If you do not hear from us within 7 days, please contact the clinic through [employer] or phone. If you have a critical or abnormal lab result, we will notify you by phone as soon as possible.  Submit refill requests through [employer] or call your pharmacy and they will forward the refill request to us. Please allow 3 business days for your refill to be completed.          Additional Information About Your Visit        Nano3D Bioscienceshart Information     [employer] gives you secure access to your electronic health record. If you see a primary care provider, you can also send messages to your care team and make appointments. If you have questions, please call your primary care clinic.  If you do not have a  primary care provider, please call [phone] and they will assist you.        Care EveryWhere ID     This is your Care EveryWhere ID. This could be used by other organizations to access your [facility] medical records  [id number]        Your Vitals Were     Last Period BMI (Body Mass Index)                12/29/2017 26.09 kg/m2           Blood Pressure from Last 3 Encounters:   03/26/18 196/70   03/02/18 110/62   02/26/18 134/76    Weight from Last 3 Encounters:   03/26/18 152 lb (68.9 kg)   03/02/18 148 lb 11.2 oz (67.4 kg)              We Performed the Following     ALT     AST     CBC with platelets     CHLAMYDIA TRACHOMATIS PCR     Creatinine urine calculation only     Creatinine     NEISSERIA GONORRHOEA PCR     Non Invasive Prenatal Test Cell Free DNA     Protein  random urine with Creat Ratio     Uric acid        Primary Care Provider Fax #    Physician No Ref-Primary [phone]       No address on file        Equal Access to Services     [facility] : Hadii miranda Cruz, waaxda vignesh, qaybta kaalmada mendez, cyndi shelton . So [facility] [phone].    ATENCIÓN: Si habla español, tiene a pickering disposición servicios gratuitos de asistencia lingüística. Susie al [phone].    We comply with applicable federal civil rights laws and [place] laws. We do not discriminate on the basis of race, color, national origin, age, disability, sex, sexual orientation, or gender identity.            Thank you!     Thank you for choosing [facility]  for your care. Our goal is always to provide you with excellent care. Hearing back from our patients is one way we can continue to improve our services. Please take a few minutes to complete the written survey that you may receive in the mail after your visit with us. Thank you!             Your Updated Medication List - Protect others around you: Learn how to safely use, store and throw away your medicines at  www.disposemymeds.org.          This list is accurate as of 3/26/18 12:24 PM.  Always use your most recent med list.                   Brand Name Dispense Instructions for use Diagnosis    prenatal multivitamin plus iron 27-0.8 MG Tabs per tablet     100 tablet    Take 1 tablet by mouth daily

## 2018-03-26 NOTE — NURSING NOTE
"Chief Complaint   Patient presents with     Prenatal Care       Initial /70  Wt 152 lb (68.9 kg)  LMP 2017  BMI 26.09 kg/m2 Estimated body mass index is 26.09 kg/(m^2) as calculated from the following:    Height as of 3/2/18: 5' 4\" (1.626 m).    Weight as of this encounter: 152 lb (68.9 kg).  Medication Reconciliation: complete         +face flushing  +pain lower abdomen   -headaches    Madie Onofre, LUANA      "

## 2018-03-27 LAB
ALT SERPL W P-5'-P-CCNC: 15 U/L (ref 0–50)
AST SERPL W P-5'-P-CCNC: 17 U/L (ref 0–45)
C TRACH DNA SPEC QL NAA+PROBE: NEGATIVE
CREAT SERPL-MCNC: 0.48 MG/DL (ref 0.52–1.04)
GFR SERPL CREATININE-BSD FRML MDRD: >90 ML/MIN/1.7M2
N GONORRHOEA DNA SPEC QL NAA+PROBE: NEGATIVE
SPECIMEN SOURCE: NORMAL
SPECIMEN SOURCE: NORMAL
URATE SERPL-MCNC: 3.4 MG/DL (ref 2.6–6)

## 2018-03-30 ENCOUNTER — TELEPHONE (OUTPATIENT)
Dept: OBGYN | Facility: CLINIC | Age: 23
End: 2018-03-30

## 2018-03-30 NOTE — TELEPHONE ENCOUNTER
Results received from Progenity testing in [facility].  Testing done:  Innatal Prenatal Screen    Action:  LM for pt to cb to report NORMAL results. and Spoke to pt and gave NORMAL results.    Gender:  Gender revealed to pt on the phone. BOY    Routed to provider as ANNEMARIE Jarvis RN

## 2018-04-27 ENCOUNTER — PRENATAL OFFICE VISIT (OUTPATIENT)
Dept: OBGYN | Facility: CLINIC | Age: 23
End: 2018-04-27
Payer: COMMERCIAL

## 2018-04-27 VITALS — WEIGHT: 158 LBS | DIASTOLIC BLOOD PRESSURE: 78 MMHG | BODY MASS INDEX: 27.12 KG/M2 | SYSTOLIC BLOOD PRESSURE: 116 MMHG

## 2018-04-27 DIAGNOSIS — Z34.02 ENCOUNTER FOR SUPERVISION OF NORMAL FIRST PREGNANCY IN SECOND TRIMESTER: Primary | ICD-10-CM

## 2018-04-27 PROCEDURE — 99207 ZZC PRENATAL VISIT: CPT | Performed by: ADVANCED PRACTICE MIDWIFE

## 2018-04-27 NOTE — PROGRESS NOTES
Echo Mcgovern is here alone today for a routine prenatal visit at 17w0d.  She has has no specific complaints and has been feeling well.  States she has been taking baby aspirin daily and feeling much better.  States headaches have resolved. First trimester screening results reviewed.  Appetite is normal. Interval weight gain is excessive.  Ultrasound referral for fetal survey discussed order placed, patient to schedule between .    Anticipatory guidance, warning signs, when to call information reviewed.   Return to Clinic in 4 weeks with OB/GYN.

## 2018-04-27 NOTE — NURSING NOTE
"Chief Complaint   Patient presents with     Prenatal Care       Initial /78  Wt 158 lb (71.7 kg)  LMP 2017  BMI 27.12 kg/m2 Estimated body mass index is 27.12 kg/(m^2) as calculated from the following:    Height as of 3/2/18: 5' 4\" (1.626 m).    Weight as of this encounter: 158 lb (71.7 kg).  Medication Reconciliation: complete         +real Onofre CMA      "

## 2018-04-27 NOTE — MR AVS SNAPSHOT
After Visit Summary   4/27/2018    Echo Mcgovern    MRN: [id number]           Patient Information     Date Of Birth          1995        Visit Information        Provider Department      4/27/2018 9:00 AM Jeannie Dent APRN CNM [facility]        Today's Diagnoses     Encounter for supervision of normal first pregnancy in second trimester    -  1       Follow-ups after your visit        Follow-up notes from your care team     Return in about 4 weeks (around 5/25/2018).      Your next 10 appointments already scheduled     May 17, 2018  5:00 PM CDT   US OB > 14 WEEKS COMPLETE SINGLE with RSCCUS2   [facility] ([facility])    [address]  [place] [address] [phone]           Please bring a list of your medicines (including vitamins, minerals and over-the-counter drugs). Also, tell your doctor about any allergies you may have. Wear comfortable clothes and leave your valuables at home.  If you re less than 20 weeks drink four 8-ounce glasses of fluid an hour before your exam. If you need to empty your bladder before your exam, try to release only a little urine. Then, drink another glass of fluid.  You may have up to two family members in the exam room. If you bring a small child, an adult must be there to care for him or her.  Please call the Imaging Department at your exam site with any questions.              Future tests that were ordered for you today     Open Future Orders        Priority Expected Expires Ordered    US OB > 14 Weeks Complete Single Routine  4/27/2019 4/27/2018            Who to contact     If you have questions or need follow up information about today's clinic visit or your schedule please contact [facility] directly at [phone].  Normal or non-critical lab and imaging results will be communicated to you by MyChart, letter or phone within 4 business days after  the clinic has received the results. If you do not hear from us within 7 days, please contact the clinic through [employer] or phone. If you have a critical or abnormal lab result, we will notify you by phone as soon as possible.  Submit refill requests through [employer] or call your pharmacy and they will forward the refill request to us. Please allow 3 business days for your refill to be completed.          Additional Information About Your Visit        [employer] network Information     [employer] gives you secure access to your electronic health record. If you see a primary care provider, you can also send messages to your care team and make appointments. If you have questions, please call your primary care clinic.  If you do not have a primary care provider, please call [phone] and they will assist you.        Care EveryWhere ID     This is your Care EveryWhere ID. This could be used by other organizations to access your [place] medical records  [id number]        Your Vitals Were     Last Period BMI (Body Mass Index)                12/29/2017 27.12 kg/m2           Blood Pressure from Last 3 Encounters:   04/27/18 116/78   03/26/18 196/70   03/02/18 110/62    Weight from Last 3 Encounters:   04/27/18 158 lb (71.7 kg)   03/26/18 152 lb (68.9 kg)   03/02/18 148 lb 11.2 oz (67.4 kg)               Primary Care Provider Fax #    Physician No Ref-Primary [phone]       No address on file        Equal Access to Services     AISSATOU OATES : Hadii miranda darden hadadalo Sohungali, waaxda luqadaha, qaybta kaalmada adegiovanniyada, cyndi shelton . So [facility] [phone].    ATENCIÓN: Si habla español, tiene a pickering disposición servicios gratuitos de asistencia lingüística. Llame al [phone].    We comply with applicable federal civil rights laws and [place] laws. We do not discriminate on the basis of race, color, national origin, age, disability, sex, sexual orientation, or gender identity.            Thank you!      Thank you for choosing [facility]  for your care. Our goal is always to provide you with excellent care. Hearing back from our patients is one way we can continue to improve our services. Please take a few minutes to complete the written survey that you may receive in the mail after your visit with us. Thank you!             Your Updated Medication List - Protect others around you: Learn how to safely use, store and throw away your medicines at www.disposemymeds.org.          This list is accurate as of 4/27/18  9:28 AM.  Always use your most recent med list.                   Brand Name Dispense Instructions for use Diagnosis    ASPIRIN PO      Take 81 mg by mouth        prenatal multivitamin plus iron 27-0.8 MG Tabs per tablet     100 tablet    Take 1 tablet by mouth daily

## 2018-05-11 ENCOUNTER — PRENATAL OFFICE VISIT (OUTPATIENT)
Dept: OBGYN | Facility: CLINIC | Age: 23
End: 2018-05-11
Payer: COMMERCIAL

## 2018-05-11 VITALS
SYSTOLIC BLOOD PRESSURE: 124 MMHG | BODY MASS INDEX: 27.83 KG/M2 | WEIGHT: 163 LBS | TEMPERATURE: 98.6 F | HEIGHT: 64 IN | DIASTOLIC BLOOD PRESSURE: 66 MMHG

## 2018-05-11 DIAGNOSIS — O09.299 H/O PRE-ECLAMPSIA IN PRIOR PREGNANCY, CURRENTLY PREGNANT: Primary | ICD-10-CM

## 2018-05-11 PROCEDURE — 99207 ZZC PRENATAL VISIT: CPT | Performed by: ADVANCED PRACTICE MIDWIFE

## 2018-05-11 RX ORDER — METOCLOPRAMIDE 10 MG/1
10 TABLET ORAL
Qty: 30 TABLET | Refills: 1 | Status: SHIPPED | OUTPATIENT
Start: 2018-05-11 | End: 2018-05-22

## 2018-05-11 NOTE — MR AVS SNAPSHOT
After Visit Summary   5/11/2018    Echo Mcgovern    MRN: [id number]           Patient Information     Date Of Birth          1995        Visit Information        Provider Department      5/11/2018 8:30 AM Tala Aleman APRN CNM [facility]        Today's Diagnoses     H/O pre-eclampsia in prior pregnancy, currently pregnant    -  1       Follow-ups after your visit        Follow-up notes from your care team     Return in about 4 weeks (around 6/8/2018) for Prenatal Visit.      Your next 10 appointments already scheduled     May 17, 2018  5:00 PM CDT   US OB > 14 WEEKS COMPLETE SINGLE with RSCCUS2   [facility] ([facility])    [address]  [place] [address] [phone]           Please bring a list of your medicines (including vitamins, minerals and over-the-counter drugs). Also, tell your doctor about any allergies you may have. Wear comfortable clothes and leave your valuables at home.  If you re less than 20 weeks drink four 8-ounce glasses of fluid an hour before your exam. If you need to empty your bladder before your exam, try to release only a little urine. Then, drink another glass of fluid.  You may have up to two family members in the exam room. If you bring a small child, an adult must be there to care for him or her.  Please call the Imaging Department at your exam site with any questions.            May 22, 2018  2:15 PM CDT   ESTABLISHED PRENATAL with Jack Anne MD   [facility]    [address]  [place] [address] [phone]              Who to contact     If you have questions or need follow up information about today's clinic visit or your schedule please contact [facility] directly at [phone].  Normal or non-critical lab and imaging results will be communicated to you by MyChart, letter  "or phone within 4 business days after the clinic has received the results. If you do not hear from us within 7 days, please contact the clinic through [employer] or phone. If you have a critical or abnormal lab result, we will notify you by phone as soon as possible.  Submit refill requests through [employer] or call your pharmacy and they will forward the refill request to us. Please allow 3 business days for your refill to be completed.          Additional Information About Your Visit        [employer] Information     [employer] gives you secure access to your electronic health record. If you see a primary care provider, you can also send messages to your care team and make appointments. If you have questions, please call your primary care clinic.  If you do not have a primary care provider, please call [phone] and they will assist you.        Care EveryWhere ID     This is your Care EveryWhere ID. This could be used by other organizations to access your [place] medical records  [id number]        Your Vitals Were     Temperature Height Last Period BMI (Body Mass Index)          98.6  F (37  C) (Oral) 5' 4\" (1.626 m) 12/29/2017 27.98 kg/m2         Blood Pressure from Last 3 Encounters:   05/11/18 124/66   04/27/18 116/78   03/26/18 196/70    Weight from Last 3 Encounters:   05/11/18 163 lb (73.9 kg)   04/27/18 158 lb (71.7 kg)   03/26/18 152 lb (68.9 kg)              Today, you had the following     No orders found for display         Today's Medication Changes          These changes are accurate as of 5/11/18  8:43 AM.  If you have any questions, ask your nurse or doctor.               Start taking these medicines.        Dose/Directions    metoclopramide 10 MG tablet   Commonly known as:  REGLAN   Used for:  H/O pre-eclampsia in prior pregnancy, currently pregnant   Started by:  Tala Aleman APRN CNMARTHA        Dose:  10 mg   Take 1 tablet (10 mg) by mouth 4 times daily (before meals and nightly) "   Quantity:  30 tablet   Refills:  1       ranitidine 75 MG tablet   Commonly known as:  ZANTAC   Used for:  H/O pre-eclampsia in prior pregnancy, currently pregnant   Started by:  Tala Aleman APRN CNM        Dose:  150 mg   Take 2 tablets (150 mg) by mouth 2 times daily   Quantity:  30 tablet   Refills:  1            Where to get your medicines      These medications were sent to [facility] [place], [place] - [address]  [address] [facility] [address]     Phone:  [phone]     metoclopramide 10 MG tablet    ranitidine 75 MG tablet                Primary Care Provider Fax #    Physician No Ref-Primary [phone]       No address on file        Equal Access to Services     AISSATOU OATES : Jess cormiero Sojae, waaxda luqadaha, qaybta kaalmada adeegyada, cyndi shelton . So [facility] [phone].    ATENCIÓN: Si habla español, tiene a pickering disposición servicios gratuitos de asistencia lingüística. [facility] [phone].    We comply with applicable federal civil rights laws and [place] laws. We do not discriminate on the basis of race, color, national origin, age, disability, sex, sexual orientation, or gender identity.            Thank you!     Thank you for choosing [facility]  for your care. Our goal is always to provide you with excellent care. Hearing back from our patients is one way we can continue to improve our services. Please take a few minutes to complete the written survey that you may receive in the mail after your visit with us. Thank you!             Your Updated Medication List - Protect others around you: Learn how to safely use, store and throw away your medicines at www.disposemymeds.org.          This list is accurate as of 5/11/18  8:43 AM.  Always use your most recent med list.                   Brand Name Dispense Instructions for use Diagnosis    ASPIRIN PO      Take 81 mg by mouth         metoclopramide 10 MG tablet    REGLAN    30 tablet    Take 1 tablet (10 mg) by mouth 4 times daily (before meals and nightly)    H/O pre-eclampsia in prior pregnancy, currently pregnant       prenatal multivitamin plus iron 27-0.8 MG Tabs per tablet     100 tablet    Take 1 tablet by mouth daily        ranitidine 75 MG tablet    ZANTAC    30 tablet    Take 2 tablets (150 mg) by mouth 2 times daily    H/O pre-eclampsia in prior pregnancy, currently pregnant

## 2018-05-11 NOTE — NURSING NOTE
"Chief Complaint   Patient presents with     Prenatal Care     lower abdominal pain into R rib cage     Pain for 2 days. Denies any abnormal discharge or bleeding.  Reports feeling nausea d/t the pain.    Initial /66  Temp 98.6  F (37  C) (Oral)  Ht 5' 4\" (1.626 m)  Wt 163 lb (73.9 kg)  LMP 2017  BMI 27.98 kg/m2 Estimated body mass index is 27.98 kg/(m^2) as calculated from the following:    Height as of this encounter: 5' 4\" (1.626 m).    Weight as of this encounter: 163 lb (73.9 kg).  BP completed using cuff size: regular        19w0d    Dorys Frias CMA             "

## 2018-05-11 NOTE — PROGRESS NOTES
"S: Reports lower abdominal pain and sharp right upper quadrant pain. The pain started 2 days ago and has been constant, not getting worse. Pain is more noticeable when she has not eaten for a while.  Has started feeling fetal movement.  Denies uterine cramping, vaginal bleeding or leaking of fluid  O: Vitals: /66  Temp 98.6  F (37  C) (Oral)  Ht 5' 4\" (1.626 m)  Wt 163 lb (73.9 kg)  LMP 12/29/2017  BMI 27.98 kg/m2  BMI= Body mass index is 27.98 kg/(m^2).  Exam:  Constitutional: healthy, alert and no distress  Respiratory: respirations even and unlabored  Gastrointestinal: Abdomen soft, non-tender. Fundus measures appropriate for gestational age. Fetal heart tones hear without difficulty and within normal limits  : Deferred  Psychiatric: mentation appears normal and affect normal/bright  A:    Diagnosis Comments   1. H/O pre-eclampsia in prior pregnancy, currently pregnant  ranitidine (ZANTAC) 75 MG tablet, metoclopramide (REGLAN) 10 MG tablet      P: Discussed 20 week fetal screen, scheduled for 5/17/2018.  Rx: Reglan 10mg PO q6hrs as needed and Zantac 75mg PO BID.  Educated on medications.   Discussed diet and oral hydration, reviewed dehydration in pregnancy. Discussed comfort measures and warning signs for pain.   Keep next scheduled appointment.    Encouraged patient to call with any questions or concerns.      Tala Aleman CNM    "

## 2018-05-17 ENCOUNTER — HOSPITAL ENCOUNTER (OUTPATIENT)
Dept: ULTRASOUND IMAGING | Facility: CLINIC | Age: 23
Discharge: HOME | End: 2018-05-17
Attending: ADVANCED PRACTICE MIDWIFE | Admitting: ADVANCED PRACTICE MIDWIFE
Payer: COMMERCIAL

## 2018-05-17 DIAGNOSIS — Z34.02 ENCOUNTER FOR SUPERVISION OF NORMAL FIRST PREGNANCY IN SECOND TRIMESTER: ICD-10-CM

## 2018-05-17 PROCEDURE — 76805 OB US >/= 14 WKS SNGL FETUS: CPT

## 2018-05-22 ENCOUNTER — PRENATAL OFFICE VISIT (OUTPATIENT)
Dept: OBGYN | Facility: CLINIC | Age: 23
End: 2018-05-22
Payer: COMMERCIAL

## 2018-05-22 VITALS — WEIGHT: 167 LBS | DIASTOLIC BLOOD PRESSURE: 70 MMHG | SYSTOLIC BLOOD PRESSURE: 116 MMHG | BODY MASS INDEX: 28.67 KG/M2

## 2018-05-22 DIAGNOSIS — O09.90 SUPERVISION OF HIGH RISK PREGNANCY, ANTEPARTUM: Primary | ICD-10-CM

## 2018-05-22 PROCEDURE — 99207 ZZC PRENATAL VISIT: CPT | Performed by: OBSTETRICS & GYNECOLOGY

## 2018-05-22 NOTE — NURSING NOTE
"Chief Complaint   Patient presents with     Prenatal Care       Initial /70  Wt 167 lb (75.8 kg)  LMP 2017  BMI 28.67 kg/m2 Estimated body mass index is 28.67 kg/(m^2) as calculated from the following:    Height as of 18: 5' 4\" (1.626 m).    Weight as of this encounter: 167 lb (75.8 kg).  BP completed using cuff size: regular        The following HM Due: NONE      +fetal movement    Madie Onofre, CMA    "

## 2018-05-22 NOTE — PROGRESS NOTES
Echo Mcgovern is a 22 year old female, 20w4d, Estimated Date of Delivery: Oct 5, 2018 who presents for prenatal care.  AGA doing well, good FM no concerns  Ultrasound on 5/17/2018 shows a following  IMPRESSION:    1. There is a single live intrauterine pregnancy in cephalic  presentation.  2. No fetal structural abnormalities are identified.    A/P: Doing well without concerns, signs and symptoms of concern discussed patient to call

## 2018-05-22 NOTE — MR AVS SNAPSHOT
After Visit Summary   5/22/2018    Echo Mcgovern    MRN: [id number]           Patient Information     Date Of Birth          1995        Visit Information        Provider Department      5/22/2018 2:15 PM Jack Anne MD [facility]        Today's Diagnoses     Supervision of high risk pregnancy, antepartum    -  1      Care Instructions    You can reach your [place] Care Team any time of the day by calling [phone]. This number will put you in touch with the 24 hour nurse line if the clinic is closed.    To contact your OB/GYN Station Coordinator/Surgery Scheduler please call [phone]. This is a direct number for your care team between 8 a.m. and 4 p.m. Monday through Friday.    [facility] is open for your convenience:  Monday through Friday 8 a.m. to 6 p.m.  Closed weekends and all major holidays.            Follow-ups after your visit        Who to contact     If you have questions or need follow up information about today's clinic visit or your schedule please contact [facility] directly at [phone].  Normal or non-critical lab and imaging results will be communicated to you by [employer], letter or phone within 4 business days after the clinic has received the results. If you do not hear from us within 7 days, please contact the clinic through [employer] or phone. If you have a critical or abnormal lab result, we will notify you by phone as soon as possible.  Submit refill requests through [employer] or call your pharmacy and they will forward the refill request to us. Please allow 3 business days for your refill to be completed.          Additional Information About Your Visit        [employer] Information     [employer] gives you secure access to your electronic health record. If you see a primary care provider, you can also send messages to your care team and make appointments. If you have questions, please call your primary care clinic.  If you  do not have a primary care provider, please call [phone] and they will assist you.        Care EveryWhere ID     This is your Care EveryWhere ID. This could be used by other organizations to access your [place] medical records  [id number]        Your Vitals Were     Last Period BMI (Body Mass Index)                12/29/2017 28.67 kg/m2           Blood Pressure from Last 3 Encounters:   05/22/18 116/70   05/11/18 124/66   04/27/18 116/78    Weight from Last 3 Encounters:   05/22/18 167 lb (75.8 kg)   05/11/18 163 lb (73.9 kg)   04/27/18 158 lb (71.7 kg)              Today, you had the following     No orders found for display         Today's Medication Changes          These changes are accurate as of 5/22/18  5:22 PM.  If you have any questions, ask your nurse or doctor.               Stop taking these medicines if you haven't already. Please contact your care team if you have questions.     metoclopramide 10 MG tablet   Commonly known as:  REGLAN   Stopped by:  Jack Anne MD           ranitidine 75 MG tablet   Commonly known as:  ZANTAC   Stopped by:  Jack Anne MD                    Primary Care Provider Fax #    Physician No Ref-Primary [phone]       No address on file        Equal Access to Services     AISSATOU OATES AH: Jess cormiero Sojae, waaxda luqadaha, qaybta kaalmada adeegyada, cyndi owens. So [facility] [phone].    ATENCIÓN: Si habla español, tiene a pickering disposición servicios gratuitos de asistencia lingüística. Llame al [phone].    We comply with applicable federal civil rights laws and [place] laws. We do not discriminate on the basis of race, color, national origin, age, disability, sex, sexual orientation, or gender identity.            Thank you!     Thank you for choosing [facility]  for your care. Our goal is always to provide you with excellent care. Hearing back from our patients is one way we can continue to  improve our services. Please take a few minutes to complete the written survey that you may receive in the mail after your visit with us. Thank you!             Your Updated Medication List - Protect others around you: Learn how to safely use, store and throw away your medicines at www.disposemymeds.org.          This list is accurate as of 5/22/18  5:22 PM.  Always use your most recent med list.                   Brand Name Dispense Instructions for use Diagnosis    ASPIRIN PO      Take 81 mg by mouth        prenatal multivitamin plus iron 27-0.8 MG Tabs per tablet     100 tablet    Take 1 tablet by mouth daily

## 2018-06-18 ENCOUNTER — PRENATAL OFFICE VISIT (OUTPATIENT)
Dept: OBGYN | Facility: CLINIC | Age: 23
End: 2018-06-18
Payer: COMMERCIAL

## 2018-06-18 VITALS — DIASTOLIC BLOOD PRESSURE: 80 MMHG | WEIGHT: 174.5 LBS | SYSTOLIC BLOOD PRESSURE: 118 MMHG | BODY MASS INDEX: 29.95 KG/M2

## 2018-06-18 DIAGNOSIS — O09.90 SUPERVISION OF HIGH RISK PREGNANCY, ANTEPARTUM: Primary | ICD-10-CM

## 2018-06-18 DIAGNOSIS — O09.299 H/O PRE-ECLAMPSIA IN PRIOR PREGNANCY, CURRENTLY PREGNANT: ICD-10-CM

## 2018-06-18 PROCEDURE — 99207 ZZC PRENATAL VISIT: CPT | Performed by: OBSTETRICS & GYNECOLOGY

## 2018-06-18 NOTE — PROGRESS NOTES
Echo Mcgovern is a 22 year old female, 24w3d, Estimated Date of Delivery: Oct 5, 2018 who presents for prenatal care.    AGA doing well, good FM no concerns  A/P: Doing well signs and symptoms of  labor and concern discussed patient to call.  Will do 1 hour blood sugar screen 1 month

## 2018-06-18 NOTE — PATIENT INSTRUCTIONS
You can reach your [place] Care Team any time of the day by calling [phone]. This number will put you in touch with the 24 hour nurse line if the clinic is closed.    To contact your OB/GYN Surgery Scheduler please call [phone]. This is a direct number for your care team between 8 a.m. and 4 p.m. Monday through Friday.    [facility] is open for your convenience: [phone]  Monday through Friday 8 a.m. to 8:30 p.m.  Saturday 9 a.m. to 6 p.m.  Sunday Noon to 6 p.m.    They are closed on all major holidays.

## 2018-06-18 NOTE — NURSING NOTE
"Chief Complaint   Patient presents with     Prenatal Care       Initial /80  Wt 174 lb 8 oz (79.2 kg)  LMP 2017  BMI 29.95 kg/m2 Estimated body mass index is 29.95 kg/(m^2) as calculated from the following:    Height as of 18: 5' 4\" (1.626 m).    Weight as of this encounter: 174 lb 8 oz (79.2 kg).  BP completed using cuff size: regular          24w3d    Yandy Bermudez [facility]            "

## 2018-06-18 NOTE — MR AVS SNAPSHOT
After Visit Summary   6/18/2018    Echo Mcgovern    MRN: [id number]           Patient Information     Date Of Birth          1995        Visit Information        Provider Department      6/18/2018 11:30 AM Jack Anne MD [facility]        Today's Diagnoses     Supervision of high risk pregnancy, antepartum    -  1    H/O pre-eclampsia in prior pregnancy, currently pregnant          Care Instructions    You can reach your [place] Care Team any time of the day by calling [phone]. This number will put you in touch with the 24 hour nurse line if the clinic is closed.    To contact your OB/GYN Surgery Scheduler please call [phone]. This is a direct number for your care team between 8 a.m. and 4 p.m. Monday through Friday.    [facility] is open for your convenience: [phone]  Monday through Friday 8 a.m. to 8:30 p.m.  Saturday 9 a.m. to 6 p.m.  Sunday Noon to 6 p.m.    They are closed on all major holidays.            Follow-ups after your visit        Follow-up notes from your care team     Return in about 1 month (around 7/18/2018) for prenatal Visit.      Your next 10 appointments already scheduled     Jul 16, 2018 10:15 AM CDT   ESTABLISHED PRENATAL with Jack Anne MD   [facility] ([facility])    [address]  [facility] [id number] [phone]              Who to contact     If you have questions or need follow up information about today's clinic visit or your schedule please contact [facility] directly at [phone].  Normal or non-critical lab and imaging results will be communicated to you by MyChart, letter or phone within 4 business days after the clinic has received the results. If you do not hear from us within 7 days, please contact the clinic through MyChart or phone. If you have a critical or abnormal lab result, we will notify you by phone as soon as possible.  Submit refill  requests through Squawka or call your pharmacy and they will forward the refill request to us. Please allow 3 business days for your refill to be completed.          Additional Information About Your Visit        Lokofotohart Information     Squ[employer]ka gives you secure access to your electronic health record. If you see a primary care provider, you can also send messages to your care team and make appointments. If you have questions, please call your primary care clinic.  If you do not have a primary care provider, please call [phone] and they will assist you.        Care EveryWhere ID     This is your Care EveryWhere ID. This could be used by other organizations to access your [place] medical records  [id number]        Your Vitals Were     Last Period BMI (Body Mass Index)                12/29/2017 29.95 kg/m2           Blood Pressure from Last 3 Encounters:   06/18/18 118/80   05/22/18 116/70   05/11/18 124/66    Weight from Last 3 Encounters:   06/18/18 174 lb 8 oz (79.2 kg)   05/22/18 167 lb (75.8 kg)   05/11/18 163 lb (73.9 kg)              Today, you had the following     No orders found for display       Primary Care Provider Fax #    Physician No Ref-Primary [phone]       No address on file        Equal Access to Services     AISSATOU OATES : Hadii miranda cormiero Sohungali, waaxda luqadaha, qaybta kaalmada adeegyada, cyndi shelton . So [facility] [phone].    ATENCIÓN: Si habla español, tiene a pickering disposición servicios gratuitos de asistencia lingüística. Llame al [phone].    We comply with applicable federal civil rights laws and [place] laws. We do not discriminate on the basis of race, color, national origin, age, disability, sex, sexual orientation, or gender identity.            Thank you!     Thank you for choosing [facility]  for your care. Our goal is always to provide you with excellent care. Hearing back from our patients is one way we can continue  to improve our services. Please take a few minutes to complete the written survey that you may receive in the mail after your visit with us. Thank you!             Your Updated Medication List - Protect others around you: Learn how to safely use, store and throw away your medicines at www.disposemymeds.org.          This list is accurate as of 6/18/18  1:13 PM.  Always use your most recent med list.                   Brand Name Dispense Instructions for use Diagnosis    ASPIRIN PO      Take 81 mg by mouth        prenatal multivitamin plus iron 27-0.8 MG Tabs per tablet     100 tablet    Take 1 tablet by mouth daily

## 2018-07-16 ENCOUNTER — PRENATAL OFFICE VISIT (OUTPATIENT)
Dept: OBGYN | Facility: CLINIC | Age: 23
End: 2018-07-16
Payer: COMMERCIAL

## 2018-07-16 VITALS
BODY MASS INDEX: 30.71 KG/M2 | DIASTOLIC BLOOD PRESSURE: 66 MMHG | SYSTOLIC BLOOD PRESSURE: 112 MMHG | HEIGHT: 64 IN | WEIGHT: 179.9 LBS

## 2018-07-16 DIAGNOSIS — O09.90 SUPERVISION OF HIGH RISK PREGNANCY, ANTEPARTUM: Primary | ICD-10-CM

## 2018-07-16 LAB
ERYTHROCYTE [DISTWIDTH] IN BLOOD BY AUTOMATED COUNT: 12.7 % (ref 10–15)
HCT VFR BLD AUTO: 37.8 % (ref 35–47)
HGB BLD-MCNC: 12.6 G/DL (ref 11.7–15.7)
MCH RBC QN AUTO: 32.6 PG (ref 26.5–33)
MCHC RBC AUTO-ENTMCNC: 33.3 G/DL (ref 31.5–36.5)
MCV RBC AUTO: 98 FL (ref 78–100)
PLATELET # BLD AUTO: 177 10E9/L (ref 150–450)
RBC # BLD AUTO: 3.86 10E12/L (ref 3.8–5.2)
WBC # BLD AUTO: 10.2 10E9/L (ref 4–11)

## 2018-07-16 PROCEDURE — 82950 GLUCOSE TEST: CPT | Performed by: OBSTETRICS & GYNECOLOGY

## 2018-07-16 PROCEDURE — 99207 ZZC PRENATAL VISIT: CPT | Performed by: OBSTETRICS & GYNECOLOGY

## 2018-07-16 PROCEDURE — 86780 TREPONEMA PALLIDUM: CPT | Performed by: OBSTETRICS & GYNECOLOGY

## 2018-07-16 PROCEDURE — 36415 COLL VENOUS BLD VENIPUNCTURE: CPT | Performed by: OBSTETRICS & GYNECOLOGY

## 2018-07-16 PROCEDURE — 85027 COMPLETE CBC AUTOMATED: CPT | Performed by: OBSTETRICS & GYNECOLOGY

## 2018-07-16 PROCEDURE — 90471 IMMUNIZATION ADMIN: CPT | Performed by: OBSTETRICS & GYNECOLOGY

## 2018-07-16 PROCEDURE — 90715 TDAP VACCINE 7 YRS/> IM: CPT | Performed by: OBSTETRICS & GYNECOLOGY

## 2018-07-16 NOTE — MR AVS SNAPSHOT
After Visit Summary   7/16/2018    Echo Mcgovern    MRN: [id number]           Patient Information     Date Of Birth          1995        Visit Information        Provider Department      7/16/2018 10:15 AM Jack Anne MD [facility]        Today's Diagnoses     Supervision of high risk pregnancy, antepartum    -  1      Care Instructions    You can reach your [place] Care Team any time of the day by calling [phone]. This number will put you in touch with the 24 hour nurse line if the clinic is closed.    To contact your OB/GYN Station Coordinator/Surgery Scheduler please call [phone]. This is a direct number for your care team between 8 a.m. and 4 p.m. Monday through Friday.    [facility] is open for your convenience:  Monday through Friday 8 a.m. to 6 p.m.  Closed weekends and all major holidays.            Follow-ups after your visit        Follow-up notes from your care team     Return in about 2 weeks (around 7/30/2018) for prenatal Visit.      Your next 10 appointments already scheduled     Jul 30, 2018 11:30 AM CDT   ESTABLISHED PRENATAL with Jack Anne MD   [facility]    [address] [place]  [facility] [address]   [phone]            Aug 13, 2018  1:30 PM CDT   ESTABLISHED PRENATAL with Jack Anen MD   [facility]    [address]  [facility] [address]   [phone]            Aug 27, 2018  9:45 AM CDT   ESTABLISHED PRENATAL with Jack Anne MD   [facility] ([facility]    [address]  [facility] [address]   [phone]              Who to contact     If you have questions or need follow up information about today's clinic visit or your schedule please contact [facility] directly at [phone].  Normal or non-critical lab and imaging results will be  "communicated to you by WiFi Railhart, letter or phone within 4 business days after the clinic has received the results. If you do not hear from us within 7 days, please contact the clinic through Event Innovation or phone. If you have a critical or abnormal lab result, we will notify you by phone as soon as possible.  Submit refill requests through [employer] or call your pharmacy and they will forward the refill request to us. Please allow 3 business days for your refill to be completed.          Additional Information About Your Visit        Event Innovation Information     [employer] gives you secure access to your electronic health record. If you see a primary care provider, you can also send messages to your care team and make appointments. If you have questions, please call your primary care clinic.  If you do not have a primary care provider, please call [phone] and they will assist you.        Care EveryWhere ID     This is your Care EveryWhere ID. This could be used by other organizations to access your [place] medical records  [id number]        Your Vitals Were     Height Last Period BMI (Body Mass Index)             5' 4\" (1.626 m) 12/29/2017 30.88 kg/m2          Blood Pressure from Last 3 Encounters:   07/16/18 112/66   06/18/18 118/80   05/22/18 116/70    Weight from Last 3 Encounters:   07/16/18 179 lb 14.4 oz (81.6 kg)   06/18/18 174 lb 8 oz (79.2 kg)   05/22/18 167 lb (75.8 kg)              We Performed the Following     CBC with platelets     Glucose tolerance, gest screen, 1 hour     TDAP, IM (10 - 64 YRS) - Adacel     Treponema Abs w Reflex to RPR and Titer        Primary Care Provider Fax #    Physician No Ref-Primary [phone]       No address on file        Equal Access to Services     ROBERT OATES : Hadii miranda Cruz, felicitas medellin, bunnyybclaire kaalmada mendez, cyndi owens. So [facility] [phone].    ATENCIÓN: Si habla español, tiene a pickering disposición servicios gratuitos de " asistencia lingüística. Susie al [phone].    We comply with applicable federal civil rights laws and [place] laws. We do not discriminate on the basis of race, color, national origin, age, disability, sex, sexual orientation, or gender identity.            Thank you!     Thank you for choosing [facility]  for your care. Our goal is always to provide you with excellent care. Hearing back from our patients is one way we can continue to improve our services. Please take a few minutes to complete the written survey that you may receive in the mail after your visit with us. Thank you!             Your Updated Medication List - Protect others around you: Learn how to safely use, store and throw away your medicines at www.disposemymeds.org.          This list is accurate as of 7/16/18 11:09 AM.  Always use your most recent med list.                   Brand Name Dispense Instructions for use Diagnosis    ASPIRIN PO      Take 81 mg by mouth        prenatal multivitamin plus iron 27-0.8 MG Tabs per tablet     100 tablet    Take 1 tablet by mouth daily

## 2018-07-16 NOTE — PROGRESS NOTES
Echo Mcgovern is a 22 year old female, 28w3d, Estimated Date of Delivery: Oct 5, 2018 who presents for prenatal care.  Good fetal movement doing well without concerns.  Blood sugar screen being done today  A/P: Doing well signs and symptoms of concern reviewed patient to call

## 2018-07-16 NOTE — PATIENT INSTRUCTIONS
You can reach your [place] Care Team any time of the day by calling [phone]. This number will put you in touch with the 24 hour nurse line if the clinic is closed.    To contact your OB/GYN Station Coordinator/Surgery Scheduler please call [phone]. This is a direct number for your care team between 8 a.m. and 4 p.m. Monday through Friday.    [facility] is open for your convenience:  Monday through Friday 8 a.m. to 6 p.m.  Closed weekends and all major holidays.

## 2018-07-16 NOTE — NURSING NOTE
"28w3d    Chief Complaint   Patient presents with     Prenatal Care       Initial /66  Ht 5' 4\" (1.626 m)  Wt 179 lb 14.4 oz (81.6 kg)  LMP 2017  BMI 30.88 kg/m2 Estimated body mass index is 30.88 kg/(m^2) as calculated from the following:    Height as of this encounter: 5' 4\" (1.626 m).    Weight as of this encounter: 179 lb 14.4 oz (81.6 kg).  BP completed using cuff size: regular        The following HM Due: Vaccinations: TDAP        "

## 2018-07-17 LAB
GLUCOSE 1H P 50 G GLC PO SERPL-MCNC: 111 MG/DL (ref 60–129)
T PALLIDUM AB SER QL: NONREACTIVE

## 2018-07-30 ENCOUNTER — PRENATAL OFFICE VISIT (OUTPATIENT)
Dept: OBGYN | Facility: CLINIC | Age: 23
End: 2018-07-30
Payer: COMMERCIAL

## 2018-07-30 VITALS — WEIGHT: 182 LBS | SYSTOLIC BLOOD PRESSURE: 122 MMHG | DIASTOLIC BLOOD PRESSURE: 80 MMHG | BODY MASS INDEX: 31.24 KG/M2

## 2018-07-30 DIAGNOSIS — O09.90 SUPERVISION OF HIGH RISK PREGNANCY, ANTEPARTUM: Primary | ICD-10-CM

## 2018-07-30 DIAGNOSIS — O09.299 H/O PRE-ECLAMPSIA IN PRIOR PREGNANCY, CURRENTLY PREGNANT: ICD-10-CM

## 2018-07-30 PROCEDURE — 99207 ZZC PRENATAL VISIT: CPT | Performed by: OBSTETRICS & GYNECOLOGY

## 2018-07-30 NOTE — NURSING NOTE
"Chief Complaint   Patient presents with     Prenatal Care       Initial /80  Wt 182 lb (82.6 kg)  LMP 2017  BMI 31.24 kg/m2 Estimated body mass index is 31.24 kg/(m^2) as calculated from the following:    Height as of 18: 5' 4\" (1.626 m).    Weight as of this encounter: 182 lb (82.6 kg).  BP completed using cuff size: regular        30w3d      Yandy Bermudez [facility]            "

## 2018-07-30 NOTE — MR AVS SNAPSHOT
After Visit Summary   7/30/2018    Echo Mcgovern    MRN: [id number]           Patient Information     Date Of Birth          1995        Visit Information        Provider Department      7/30/2018 11:30 AM Jack Anne MD [facility]        Today's Diagnoses     Supervision of high risk pregnancy, antepartum    -  1    H/O pre-eclampsia in prior pregnancy, currently pregnant          Care Instructions    You can reach your [place] Care Team any time of the day by calling [phone]. This number will put you in touch with the 24 hour nurse line if the clinic is closed.    To contact your OB/GYN Station Coordinator/Surgery Scheduler please call [phone]. This is a direct number for your care team between 8 a.m. and 4 p.m. Monday through Friday.    [facility] is open for your convenience:  Monday through Friday 8 a.m. to 6 p.m.  Closed weekends and all major holidays.            Follow-ups after your visit        Follow-up notes from your care team     Return in about 2 weeks (around 8/13/2018).      Your next 10 appointments already scheduled     Aug 13, 2018  1:30 PM CDT   ESTABLISHED PRENATAL with Jack Anne MD   [facility]    [address]  [facility] [id number] [phone]            Aug 27, 2018  9:45 AM CDT   ESTABLISHED PRENATAL with Jack Anne MD   [facility]    [address]  [facility] [id number] [phone]              Who to contact     If you have questions or need follow up information about today's clinic visit or your schedule please contact [facility] directly at [phone].  Normal or non-critical lab and imaging results will be communicated to you by MyChart, letter or phone within 4 business days after the clinic has received the results. If you do not hear from us within 7 days, please contact  the clinic through Delectablet or phone. If you have a critical or abnormal lab result, we will notify you by phone as soon as possible.  Submit refill requests through [employer] or call your pharmacy and they will forward the refill request to us. Please allow 3 business days for your refill to be completed.          Additional Information About Your Visit        WorldStatehart Information     [employer] gives you secure access to your electronic health record. If you see a primary care provider, you can also send messages to your care team and make appointments. If you have questions, please call your primary care clinic.  If you do not have a primary care provider, please call [phone] and they will assist you.        Care EveryWhere ID     This is your Care EveryWhere ID. This could be used by other organizations to access your [place] medical records  [id number]        Your Vitals Were     Last Period BMI (Body Mass Index)                12/29/2017 31.24 kg/m2           Blood Pressure from Last 3 Encounters:   07/30/18 122/80   07/16/18 112/66   06/18/18 118/80    Weight from Last 3 Encounters:   07/30/18 182 lb (82.6 kg)   07/16/18 179 lb 14.4 oz (81.6 kg)   06/18/18 174 lb 8 oz (79.2 kg)              Today, you had the following     No orders found for display       Primary Care Provider Fax #    Physician No Ref-Primary [phone]       No address on file        Equal Access to Services     AISSATOU OATES : Hadii miranda workman Sojae, waaxda luqadaha, qaybta kaalmada mendez, cyndi shelton . So [facility] [phone].    ATENCIÓN: Si habla español, tiene a pickering disposición servicios gratuitos de asistencia lingüística. Susie al [phone].    We comply with applicable federal civil rights laws and [place] laws. We do not discriminate on the basis of race, color, national origin, age, disability, sex, sexual orientation, or gender identity.            Thank you!     Thank you for choosing  [facility]  for your care. Our goal is always to provide you with excellent care. Hearing back from our patients is one way we can continue to improve our services. Please take a few minutes to complete the written survey that you may receive in the mail after your visit with us. Thank you!             Your Updated Medication List - Protect others around you: Learn how to safely use, store and throw away your medicines at www.disposemymeds.org.          This list is accurate as of 7/30/18 11:59 PM.  Always use your most recent med list.                   Brand Name Dispense Instructions for use Diagnosis    ASPIRIN PO      Take 81 mg by mouth        prenatal multivitamin plus iron 27-0.8 MG Tabs per tablet     100 tablet    Take 1 tablet by mouth daily

## 2018-07-30 NOTE — PATIENT INSTRUCTIONS
You can reach your [place] Care Team any time of the day by calling [phone]. This number will put you in touch with the 24 hour nurse line if the clinic is closed.    To contact your OB/GYN Station Coordinator/Surgery Scheduler please call [phone]. This is a direct number for your care team between 8 a.m. and 4 p.m. Monday through Friday.    [facility] is open for your convenience:  Monday through Friday 8 a.m. to 6 p.m.  Closed weekends and all major holidays.

## 2018-08-01 NOTE — PROGRESS NOTES
Echo Mcgovern is a 23 year old female, 30w5d, Estimated Date of Delivery: Oct 5, 2018 who presents for prenatal care.  Good fetal movement no concerns  A/P:         Symptoms of concern discussed patient to call.  Labor symptoms discussed patient to call

## 2018-08-13 ENCOUNTER — PRENATAL OFFICE VISIT (OUTPATIENT)
Dept: OBGYN | Facility: CLINIC | Age: 23
End: 2018-08-13
Payer: COMMERCIAL

## 2018-08-13 VITALS — SYSTOLIC BLOOD PRESSURE: 120 MMHG | WEIGHT: 189 LBS | BODY MASS INDEX: 32.44 KG/M2 | DIASTOLIC BLOOD PRESSURE: 74 MMHG

## 2018-08-13 DIAGNOSIS — O09.90 SUPERVISION OF HIGH RISK PREGNANCY, ANTEPARTUM: Primary | ICD-10-CM

## 2018-08-13 DIAGNOSIS — O09.299 H/O PRE-ECLAMPSIA IN PRIOR PREGNANCY, CURRENTLY PREGNANT: ICD-10-CM

## 2018-08-13 PROCEDURE — 99207 ZZC PRENATAL VISIT: CPT | Performed by: OBSTETRICS & GYNECOLOGY

## 2018-08-13 NOTE — MR AVS SNAPSHOT
After Visit Summary   8/13/2018    Echo Mcgovern    MRN: [id number]           Patient Information     Date Of Birth          1995        Visit Information        Provider Department      8/13/2018 1:30 PM Jack Anne MD [facility]        Today's Diagnoses     Supervision of high risk pregnancy, antepartum    -  1    H/O pre-eclampsia in prior pregnancy, currently pregnant          Care Instructions    You can reach your [place] Care Team any time of the day by calling [phone]. This number will put you in touch with the 24 hour nurse line if the clinic is closed.    To contact your OB/GYN Station Coordinator/Surgery Scheduler please call [phone]. This is a direct number for your care team between 8 a.m. and 4 p.m. Monday through Friday.    [facility] is open for your convenience:  Monday through Friday 8 a.m. to 6 p.m.  Closed weekends and all major holidays.            Follow-ups after your visit        Follow-up notes from your care team     Return in about 2 weeks (around 8/27/2018) for prenatal Visit.      Your next 10 appointments already scheduled     Aug 27, 2018  9:45 AM CDT   ESTABLISHED PRENATAL with Jack Anne MD   [facility]    [address]  [address] [place] [address]   [phone]            Sep 11, 2018  4:15 PM CDT   ESTABLISHED PRENATAL with Jack Anne MD   [facility] ([facility])    [address]  [address] [place] [address]   [phone]            Sep 17, 2018 11:00 AM CDT   ESTABLISHED PRENATAL with Jack Anne MD   [facility]    [address]  [address] [place] [address]   [phone]            Sep 24, 2018 10:45 AM CDT   ESTABLISHED PRENATAL with Jack Anne MD   [facility] ([facility]    [address]  [facility] [id number] [phone]              Who to contact     If you have questions or need follow up information about today's clinic visit or your schedule please contact [facility] directly at [phone].  Normal or non-critical lab and imaging results will be communicated to you by MyChart, letter or phone within 4 business days after the clinic has received the results. If you do not hear from us within 7 days, please contact the clinic through MyChart or phone. If you have a critical or abnormal lab result, we will notify you by phone as soon as possible.  Submit refill requests through [employer] or call your pharmacy and they will forward the refill request to us. Please allow 3 business days for your refill to be completed.          Additional Information About Your Visit        MyChart Information     [employer] gives you secure access to your electronic health record. If you see a primary care provider, you can also send messages to your care team and make appointments. If you have questions, please call your primary care clinic.  If you do not have a primary care provider, please call [phone] and they will assist you.        Care EveryWhere ID     This is your Care EveryWhere ID. This could be used by other organizations to access your [place] medical records  [id number]        Your Vitals Were     Last Period BMI (Body Mass Index)                12/29/2017 32.44 kg/m2           Blood Pressure from Last 3 Encounters:   08/13/18 120/74   07/30/18 122/80   07/16/18 112/66    Weight from Last 3 Encounters:   08/13/18 189 lb (85.7 kg)   07/30/18 182 lb (82.6 kg)   07/16/18 179 lb 14.4 oz (81.6 kg)              Today, you had the following     No orders found for display       Primary Care Provider Fax #    Physician No Ref-Primary [phone]       No address on file        Equal Access to Services     AISSATOU OATES : felicitas Poe  nicholas medellinmavon teranlisbethlucy longoriarimma jersonyelitza owens. So [facility] [phone].    ATENCIÓN: Si hong dickson, tiene a pickering disposición servicios gratuitos de asistencia lingüística. Llame al [phone].    We comply with applicable federal civil rights laws and [place] laws. We do not discriminate on the basis of race, color, national origin, age, disability, sex, sexual orientation, or gender identity.            Thank you!     Thank you for choosing [facility]  for your care. Our goal is always to provide you with excellent care. Hearing back from our patients is one way we can continue to improve our services. Please take a few minutes to complete the written survey that you may receive in the mail after your visit with us. Thank you!             Your Updated Medication List - Protect others around you: Learn how to safely use, store and throw away your medicines at www.disposemymeds.org.          This list is accurate as of 8/13/18  1:43 PM.  Always use your most recent med list.                   Brand Name Dispense Instructions for use Diagnosis    ASPIRIN PO      Take 81 mg by mouth        prenatal multivitamin plus iron 27-0.8 MG Tabs per tablet     100 tablet    Take 1 tablet by mouth daily

## 2018-08-13 NOTE — PROGRESS NOTES
Echo Mcgovern is a 23 year old female, 32w3d, Estimated Date of Delivery: Oct 5, 2018 who presents for prenatal care.  Patient is noting more pelvic pressure.  No bleeding leaking fluid decreased activity.  We had a lengthy discussion regarding the signs and symptoms of  labor and her Rapid second stage with her first child    Exam today revealed the cervix to be long closed firm the presenting part ballotable station I do not see evidence of  labor I believe her symptoms are ligamentous and normal symptoms with associated with third trimester.  Signs and symptoms of  labor discussed patient to call

## 2018-08-13 NOTE — NURSING NOTE
"Chief Complaint   Patient presents with     Prenatal Care       Initial /74  Wt 189 lb (85.7 kg)  LMP 2017  BMI 32.44 kg/m2 Estimated body mass index is 32.44 kg/(m^2) as calculated from the following:    Height as of 18: 5' 4\" (1.626 m).    Weight as of this encounter: 189 lb (85.7 kg).  BP completed using cuff size: regular        32w3d    Yandy Bermudez [facility]            "

## 2018-08-13 NOTE — PATIENT INSTRUCTIONS
You can reach your [place] Care Team any time of the day by calling [phone]. This number will put you in touch with the 24 hour nurse line if the clinic is closed.    To contact your OB/GYN Station Coordinator/Surgery Scheduler please call [phone]. This is a direct number for your care team between 8 a.m. and 4 p.m. Monday through Friday.    [facility] is open for your convenience:  Monday through Friday 8 a.m. to 6 p.m.  Closed weekends and all major holidays.

## 2018-08-27 ENCOUNTER — PRENATAL OFFICE VISIT (OUTPATIENT)
Dept: OBGYN | Facility: CLINIC | Age: 23
End: 2018-08-27
Payer: COMMERCIAL

## 2018-08-27 VITALS — BODY MASS INDEX: 32.79 KG/M2 | SYSTOLIC BLOOD PRESSURE: 112 MMHG | DIASTOLIC BLOOD PRESSURE: 76 MMHG | WEIGHT: 191 LBS

## 2018-08-27 DIAGNOSIS — O09.299 H/O PRE-ECLAMPSIA IN PRIOR PREGNANCY, CURRENTLY PREGNANT: ICD-10-CM

## 2018-08-27 DIAGNOSIS — O09.90 SUPERVISION OF HIGH RISK PREGNANCY, ANTEPARTUM: Primary | ICD-10-CM

## 2018-08-27 PROCEDURE — 99207 ZZC PRENATAL VISIT: CPT | Performed by: OBSTETRICS & GYNECOLOGY

## 2018-08-27 NOTE — MR AVS SNAPSHOT
After Visit Summary   8/27/2018    Echo Mcgovern    MRN: [id number]           Patient Information     Date Of Birth          1995        Visit Information        Provider Department      8/27/2018 9:45 AM Jack Anne MD [facility]        Today's Diagnoses     Supervision of high risk pregnancy, antepartum    -  1    H/O pre-eclampsia in prior pregnancy, currently pregnant          Care Instructions    You can reach your [place] Care Team any time of the day by calling [phone]. This number will put you in touch with the 24 hour nurse line if the clinic is closed.    To contact your OB/GYN Station Coordinator/Surgery Scheduler please call [phone]. This is a direct number for your care team between 8 a.m. and 4 p.m. Monday through Friday.    [facility] is open for your convenience:  Monday through Friday 8 a.m. to 6 p.m.  Closed weekends and all major holidays.            Follow-ups after your visit        Follow-up notes from your care team     Return in about 2 weeks (around 9/10/2018) for prenatal Visit.      Your next 10 appointments already scheduled     Sep 11, 2018  4:15 PM CDT   ESTABLISHED PRENATAL with Jack Anne MD   [facility] ([facility])    [address]  [address] [place] [address]   [phone]            Sep 17, 2018 11:00 AM CDT   ESTABLISHED PRENATAL with Jack Anne MD   [facility] ([facility])    [address] [place]  [address] [place] [address]   [phone]            Sep 24, 2018 10:45 AM CDT   ESTABLISHED PRENATAL with Jack Anne MD   [facility] ([facility])    [address] [place]  [address] [place] [address]   [phone]            Oct 01, 2018 10:15 AM CDT   ESTABLISHED PRENATAL with Jack Anne MD   [facility] ([facility])    [address]  [facility] [id number] [phone]              Who to contact     If you have questions or need follow up information about today's clinic visit or your schedule please contact [facility] directly at [phone].  Normal or non-critical lab and imaging results will be communicated to you by MyChart, letter or phone within 4 business days after the clinic has received the results. If you do not hear from us within 7 days, please contact the clinic through MyChart or phone. If you have a critical or abnormal lab result, we will notify you by phone as soon as possible.  Submit refill requests through [employer] or call your pharmacy and they will forward the refill request to us. Please allow 3 business days for your refill to be completed.          Additional Information About Your Visit        MyChart Information     [employer] gives you secure access to your electronic health record. If you see a primary care provider, you can also send messages to your care team and make appointments. If you have questions, please call your primary care clinic.  If you do not have a primary care provider, please call [phone] and they will assist you.        Care EveryWhere ID     This is your Care EveryWhere ID. This could be used by other organizations to access your [place] medical records  [id number]        Your Vitals Were     Last Period BMI (Body Mass Index)                12/29/2017 32.79 kg/m2           Blood Pressure from Last 3 Encounters:   08/27/18 112/76   08/13/18 120/74   07/30/18 122/80    Weight from Last 3 Encounters:   08/27/18 191 lb (86.6 kg)   08/13/18 189 lb (85.7 kg)   07/30/18 182 lb (82.6 kg)              Today, you had the following     No orders found for display       Primary Care Provider Fax #    Physician No Ref-Primary [phone]       No address on file        Equal Access to Services     AISSATOU OATES : felicitas Poe  nicholas medellinmavon teranlisbethlucy longoriarimma jersonyelitza owens. So [facility] [phone].    ATENCIÓN: Si hong dickson, tiene a pickering disposición servicios gratuitos de asistencia lingüística. Llame al [phone].    We comply with applicable federal civil rights laws and [place] laws. We do not discriminate on the basis of race, color, national origin, age, disability, sex, sexual orientation, or gender identity.            Thank you!     Thank you for choosing [facility]  for your care. Our goal is always to provide you with excellent care. Hearing back from our patients is one way we can continue to improve our services. Please take a few minutes to complete the written survey that you may receive in the mail after your visit with us. Thank you!             Your Updated Medication List - Protect others around you: Learn how to safely use, store and throw away your medicines at www.disposemymeds.org.          This list is accurate as of 8/27/18 10:10 PM.  Always use your most recent med list.                   Brand Name Dispense Instructions for use Diagnosis    ASPIRIN PO      Take 81 mg by mouth        prenatal multivitamin plus iron 27-0.8 MG Tabs per tablet     100 tablet    Take 1 tablet by mouth daily

## 2018-08-27 NOTE — NURSING NOTE
"Chief Complaint   Patient presents with     Prenatal Care       Initial /76  Wt 191 lb (86.6 kg)  LMP 2017  BMI 32.79 kg/m2 Estimated body mass index is 32.79 kg/(m^2) as calculated from the following:    Height as of 18: 5' 4\" (1.626 m).    Weight as of this encounter: 191 lb (86.6 kg).  BP completed using cuff size: regular        Nausea in the morning and headaches    34w3d      Yandy Bermudez, [facility]             "

## 2018-08-27 NOTE — PATIENT INSTRUCTIONS
You can reach your [place] Care Team any time of the day by calling [phone]. This number will put you in touch with the 24 hour nurse line if the clinic is closed.    To contact your OB/GYN Station Coordinator/Surgery Scheduler please call [phone]. This is a direct number for your care team between 8 a.m. and 4 p.m. Monday through Friday.    [facility] is open for your convenience:  Monday through Friday 8 a.m. to 6 p.m.  Closed weekends and all major holidays.

## 2018-08-28 NOTE — PROGRESS NOTES
Echo Mcgovern is a 23 year old female, 34w3d, Estimated Date of Delivery: Oct 5, 2018 who presents for prenatal care.  Good fetal movement.  Patient denies concerns    A/P:  Sx of labor and concern discussed pt to call

## 2018-09-11 ENCOUNTER — ANESTHESIA EVENT (OUTPATIENT)
Dept: OBGYN | Facility: CLINIC | Age: 23
End: 2018-09-11
Payer: COMMERCIAL

## 2018-09-11 ENCOUNTER — HOSPITAL ENCOUNTER (INPATIENT)
Facility: CLINIC | Age: 23
LOS: 3 days | Discharge: HOME | End: 2018-09-14
Attending: FAMILY MEDICINE | Admitting: FAMILY MEDICINE
Payer: COMMERCIAL

## 2018-09-11 ENCOUNTER — ANESTHESIA (OUTPATIENT)
Dept: OBGYN | Facility: CLINIC | Age: 23
End: 2018-09-11
Payer: COMMERCIAL

## 2018-09-11 ENCOUNTER — PRENATAL OFFICE VISIT (OUTPATIENT)
Dept: OBGYN | Facility: CLINIC | Age: 23
End: 2018-09-11
Payer: COMMERCIAL

## 2018-09-11 VITALS
WEIGHT: 194.9 LBS | SYSTOLIC BLOOD PRESSURE: 140 MMHG | BODY MASS INDEX: 33.28 KG/M2 | DIASTOLIC BLOOD PRESSURE: 98 MMHG | HEIGHT: 64 IN

## 2018-09-11 DIAGNOSIS — O09.90 SUPERVISION OF HIGH RISK PREGNANCY, ANTEPARTUM: Primary | ICD-10-CM

## 2018-09-11 DIAGNOSIS — R03.0 ELEVATED BLOOD PRESSURE READING WITHOUT DIAGNOSIS OF HYPERTENSION: ICD-10-CM

## 2018-09-11 DIAGNOSIS — O09.299 H/O PRE-ECLAMPSIA IN PRIOR PREGNANCY, CURRENTLY PREGNANT: ICD-10-CM

## 2018-09-11 PROBLEM — O14.10 SEVERE PREECLAMPSIA: Status: ACTIVE | Noted: 2018-09-11

## 2018-09-11 LAB
ABO + RH BLD: NORMAL
ABO + RH BLD: NORMAL
ALT SERPL W P-5'-P-CCNC: 16 U/L (ref 0–50)
AST SERPL W P-5'-P-CCNC: 19 U/L (ref 0–45)
BASOPHILS # BLD AUTO: 0 10E9/L (ref 0–0.2)
BASOPHILS NFR BLD AUTO: 0.3 %
BLD GP AB SCN SERPL QL: NORMAL
BLOOD BANK CMNT PATIENT-IMP: NORMAL
BUN SERPL-MCNC: 9 MG/DL (ref 7–30)
CREAT SERPL-MCNC: 0.7 MG/DL (ref 0.52–1.04)
CREAT UR-MCNC: 38 MG/DL
DIFFERENTIAL METHOD BLD: NORMAL
EOSINOPHIL # BLD AUTO: 0.1 10E9/L (ref 0–0.7)
EOSINOPHIL NFR BLD AUTO: 1.3 %
ERYTHROCYTE [DISTWIDTH] IN BLOOD BY AUTOMATED COUNT: 12.9 % (ref 10–15)
GFR SERPL CREATININE-BSD FRML MDRD: >90 ML/MIN/1.7M2
HCT VFR BLD AUTO: 40.1 % (ref 35–47)
HGB BLD-MCNC: 13.6 G/DL (ref 11.7–15.7)
LYMPHOCYTES # BLD AUTO: 1.4 10E9/L (ref 0.8–5.3)
LYMPHOCYTES NFR BLD AUTO: 14.3 %
MCH RBC QN AUTO: 32.5 PG (ref 26.5–33)
MCHC RBC AUTO-ENTMCNC: 33.9 G/DL (ref 31.5–36.5)
MCV RBC AUTO: 96 FL (ref 78–100)
MONOCYTES # BLD AUTO: 0.6 10E9/L (ref 0–1.3)
MONOCYTES NFR BLD AUTO: 6.3 %
NEUTROPHILS # BLD AUTO: 7.6 10E9/L (ref 1.6–8.3)
NEUTROPHILS NFR BLD AUTO: 77.8 %
PLATELET # BLD AUTO: 181 10E9/L (ref 150–450)
PROT UR-MCNC: 0.08 G/L
PROT/CREAT 24H UR: 0.23 G/G CR (ref 0–0.2)
RBC # BLD AUTO: 4.18 10E12/L (ref 3.8–5.2)
SPECIMEN EXP DATE BLD: NORMAL
URATE SERPL-MCNC: 6.1 MG/DL (ref 2.6–6)
WBC # BLD AUTO: 9.8 10E9/L (ref 4–11)

## 2018-09-11 PROCEDURE — 82565 ASSAY OF CREATININE: CPT | Performed by: OBSTETRICS & GYNECOLOGY

## 2018-09-11 PROCEDURE — 25000128 H RX IP 250 OP 636: Performed by: FAMILY MEDICINE

## 2018-09-11 PROCEDURE — 3E033VJ INTRODUCTION OF OTHER HORMONE INTO PERIPHERAL VEIN, PERCUTANEOUS APPROACH: ICD-10-PCS | Performed by: FAMILY MEDICINE

## 2018-09-11 PROCEDURE — 86850 RBC ANTIBODY SCREEN: CPT | Performed by: FAMILY MEDICINE

## 2018-09-11 PROCEDURE — 86900 BLOOD TYPING SEROLOGIC ABO: CPT | Performed by: FAMILY MEDICINE

## 2018-09-11 PROCEDURE — 25000125 ZZHC RX 250: Performed by: FAMILY MEDICINE

## 2018-09-11 PROCEDURE — 86780 TREPONEMA PALLIDUM: CPT | Performed by: FAMILY MEDICINE

## 2018-09-11 PROCEDURE — 86901 BLOOD TYPING SEROLOGIC RH(D): CPT | Performed by: FAMILY MEDICINE

## 2018-09-11 PROCEDURE — 37000011 ZZH ANESTHESIA WARD SERVICE

## 2018-09-11 PROCEDURE — 84450 TRANSFERASE (AST) (SGOT): CPT | Performed by: OBSTETRICS & GYNECOLOGY

## 2018-09-11 PROCEDURE — 12000031 ZZH R&B OB CRITICAL

## 2018-09-11 PROCEDURE — 84460 ALANINE AMINO (ALT) (SGPT): CPT | Performed by: OBSTETRICS & GYNECOLOGY

## 2018-09-11 PROCEDURE — 36415 COLL VENOUS BLD VENIPUNCTURE: CPT | Performed by: FAMILY MEDICINE

## 2018-09-11 PROCEDURE — 84520 ASSAY OF UREA NITROGEN: CPT | Performed by: OBSTETRICS & GYNECOLOGY

## 2018-09-11 PROCEDURE — 25000132 ZZH RX MED GY IP 250 OP 250 PS 637: Performed by: FAMILY MEDICINE

## 2018-09-11 PROCEDURE — 99207 ZZC PRENATAL VISIT: CPT | Performed by: OBSTETRICS & GYNECOLOGY

## 2018-09-11 PROCEDURE — 85025 COMPLETE CBC W/AUTO DIFF WBC: CPT | Performed by: OBSTETRICS & GYNECOLOGY

## 2018-09-11 PROCEDURE — 84550 ASSAY OF BLOOD/URIC ACID: CPT | Performed by: OBSTETRICS & GYNECOLOGY

## 2018-09-11 PROCEDURE — 84156 ASSAY OF PROTEIN URINE: CPT | Performed by: FAMILY MEDICINE

## 2018-09-11 PROCEDURE — 40000671 ZZH STATISTIC ANESTHESIA CASE

## 2018-09-11 RX ORDER — NALOXONE HYDROCHLORIDE 0.4 MG/ML
.1-.4 INJECTION, SOLUTION INTRAMUSCULAR; INTRAVENOUS; SUBCUTANEOUS
Status: DISCONTINUED | OUTPATIENT
Start: 2018-09-11 | End: 2018-09-12

## 2018-09-11 RX ORDER — TERBUTALINE SULFATE 1 MG/ML
0.25 INJECTION, SOLUTION SUBCUTANEOUS
Status: DISCONTINUED | OUTPATIENT
Start: 2018-09-11 | End: 2018-09-12

## 2018-09-11 RX ORDER — CARBOPROST TROMETHAMINE 250 UG/ML
250 INJECTION, SOLUTION INTRAMUSCULAR
Status: DISCONTINUED | OUTPATIENT
Start: 2018-09-11 | End: 2018-09-12

## 2018-09-11 RX ORDER — OXYCODONE AND ACETAMINOPHEN 5; 325 MG/1; MG/1
1 TABLET ORAL
Status: DISCONTINUED | OUTPATIENT
Start: 2018-09-11 | End: 2018-09-12

## 2018-09-11 RX ORDER — PENICILLIN G POTASSIUM 5000000 [IU]/1
5 INJECTION, POWDER, FOR SOLUTION INTRAMUSCULAR; INTRAVENOUS ONCE
Status: COMPLETED | OUTPATIENT
Start: 2018-09-11 | End: 2018-09-11

## 2018-09-11 RX ORDER — LORAZEPAM 2 MG/ML
2 INJECTION INTRAMUSCULAR
Status: DISCONTINUED | OUTPATIENT
Start: 2018-09-11 | End: 2018-09-12

## 2018-09-11 RX ORDER — IBUPROFEN 800 MG/1
800 TABLET, FILM COATED ORAL
Status: COMPLETED | OUTPATIENT
Start: 2018-09-11 | End: 2018-09-12

## 2018-09-11 RX ORDER — SODIUM CHLORIDE, SODIUM LACTATE, POTASSIUM CHLORIDE, CALCIUM CHLORIDE 600; 310; 30; 20 MG/100ML; MG/100ML; MG/100ML; MG/100ML
10-125 INJECTION, SOLUTION INTRAVENOUS CONTINUOUS
Status: DISCONTINUED | OUTPATIENT
Start: 2018-09-11 | End: 2018-09-12

## 2018-09-11 RX ORDER — NALBUPHINE HYDROCHLORIDE 10 MG/ML
2.5-5 INJECTION, SOLUTION INTRAMUSCULAR; INTRAVENOUS; SUBCUTANEOUS EVERY 6 HOURS PRN
Status: DISCONTINUED | OUTPATIENT
Start: 2018-09-11 | End: 2018-09-12

## 2018-09-11 RX ORDER — MAGNESIUM SULFATE HEPTAHYDRATE 500 MG/ML
4 INJECTION, SOLUTION INTRAMUSCULAR; INTRAVENOUS
Status: DISCONTINUED | OUTPATIENT
Start: 2018-09-11 | End: 2018-09-12

## 2018-09-11 RX ORDER — ACETAMINOPHEN 500 MG
1000 TABLET ORAL ONCE
Status: COMPLETED | OUTPATIENT
Start: 2018-09-11 | End: 2018-09-11

## 2018-09-11 RX ORDER — MAGNESIUM SULFATE HEPTAHYDRATE 40 MG/ML
4 INJECTION, SOLUTION INTRAVENOUS ONCE
Status: COMPLETED | OUTPATIENT
Start: 2018-09-11 | End: 2018-09-11

## 2018-09-11 RX ORDER — SODIUM CHLORIDE, SODIUM LACTATE, POTASSIUM CHLORIDE, CALCIUM CHLORIDE 600; 310; 30; 20 MG/100ML; MG/100ML; MG/100ML; MG/100ML
INJECTION, SOLUTION INTRAVENOUS CONTINUOUS
Status: DISCONTINUED | OUTPATIENT
Start: 2018-09-11 | End: 2018-09-12

## 2018-09-11 RX ORDER — LABETALOL HYDROCHLORIDE 5 MG/ML
20 INJECTION, SOLUTION INTRAVENOUS EVERY 10 MIN PRN
Status: DISCONTINUED | OUTPATIENT
Start: 2018-09-11 | End: 2018-09-12

## 2018-09-11 RX ORDER — OXYTOCIN/0.9 % SODIUM CHLORIDE 30/500 ML
1-24 PLASTIC BAG, INJECTION (ML) INTRAVENOUS CONTINUOUS
Status: DISCONTINUED | OUTPATIENT
Start: 2018-09-11 | End: 2018-09-12

## 2018-09-11 RX ORDER — BETAMETHASONE SODIUM PHOSPHATE AND BETAMETHASONE ACETATE 3; 3 MG/ML; MG/ML
12 INJECTION, SUSPENSION INTRA-ARTICULAR; INTRALESIONAL; INTRAMUSCULAR; SOFT TISSUE EVERY 24 HOURS
Status: DISCONTINUED | OUTPATIENT
Start: 2018-09-11 | End: 2018-09-12

## 2018-09-11 RX ORDER — LIDOCAINE 40 MG/G
CREAM TOPICAL
Status: DISCONTINUED | OUTPATIENT
Start: 2018-09-11 | End: 2018-09-12

## 2018-09-11 RX ORDER — MAGNESIUM SULFATE IN WATER 40 MG/ML
2 INJECTION, SOLUTION INTRAVENOUS CONTINUOUS
Status: DISCONTINUED | OUTPATIENT
Start: 2018-09-11 | End: 2018-09-12

## 2018-09-11 RX ORDER — MAGNESIUM SULFATE HEPTAHYDRATE 40 MG/ML
4 INJECTION, SOLUTION INTRAVENOUS
Status: DISCONTINUED | OUTPATIENT
Start: 2018-09-11 | End: 2018-09-12

## 2018-09-11 RX ORDER — METHYLERGONOVINE MALEATE 0.2 MG/ML
200 INJECTION INTRAVENOUS
Status: DISCONTINUED | OUTPATIENT
Start: 2018-09-11 | End: 2018-09-12

## 2018-09-11 RX ORDER — LABETALOL HYDROCHLORIDE 5 MG/ML
40 INJECTION, SOLUTION INTRAVENOUS EVERY 10 MIN PRN
Status: DISCONTINUED | OUTPATIENT
Start: 2018-09-11 | End: 2018-09-12

## 2018-09-11 RX ORDER — CALCIUM GLUCONATE 94 MG/ML
1 INJECTION, SOLUTION INTRAVENOUS
Status: DISCONTINUED | OUTPATIENT
Start: 2018-09-11 | End: 2018-09-12

## 2018-09-11 RX ORDER — EPHEDRINE SULFATE 50 MG/ML
5 INJECTION, SOLUTION INTRAMUSCULAR; INTRAVENOUS; SUBCUTANEOUS
Status: DISCONTINUED | OUTPATIENT
Start: 2018-09-11 | End: 2018-09-12

## 2018-09-11 RX ORDER — ACETAMINOPHEN 325 MG/1
650 TABLET ORAL EVERY 4 HOURS PRN
Status: DISCONTINUED | OUTPATIENT
Start: 2018-09-11 | End: 2018-09-11

## 2018-09-11 RX ORDER — ONDANSETRON 2 MG/ML
4 INJECTION INTRAMUSCULAR; INTRAVENOUS EVERY 6 HOURS PRN
Status: DISCONTINUED | OUTPATIENT
Start: 2018-09-11 | End: 2018-09-12

## 2018-09-11 RX ORDER — OXYTOCIN 10 [USP'U]/ML
10 INJECTION, SOLUTION INTRAMUSCULAR; INTRAVENOUS
Status: DISCONTINUED | OUTPATIENT
Start: 2018-09-11 | End: 2018-09-12

## 2018-09-11 RX ORDER — LABETALOL HYDROCHLORIDE 5 MG/ML
80 INJECTION, SOLUTION INTRAVENOUS EVERY 10 MIN PRN
Status: DISCONTINUED | OUTPATIENT
Start: 2018-09-11 | End: 2018-09-12

## 2018-09-11 RX ORDER — ONDANSETRON 2 MG/ML
4 INJECTION INTRAMUSCULAR; INTRAVENOUS EVERY 6 HOURS PRN
Status: DISCONTINUED | OUTPATIENT
Start: 2018-09-11 | End: 2018-09-11

## 2018-09-11 RX ORDER — BUPIVACAINE HCL/0.9 % NACL/PF 0.125 %
PLASTIC BAG, INJECTION (ML) EPIDURAL CONTINUOUS
Status: DISCONTINUED | OUTPATIENT
Start: 2018-09-11 | End: 2018-09-12

## 2018-09-11 RX ORDER — NIFEDIPINE 10 MG/1
10 CAPSULE ORAL
Status: DISCONTINUED | OUTPATIENT
Start: 2018-09-11 | End: 2018-09-12

## 2018-09-11 RX ORDER — ACETAMINOPHEN 325 MG/1
650 TABLET ORAL EVERY 4 HOURS PRN
Status: DISCONTINUED | OUTPATIENT
Start: 2018-09-11 | End: 2018-09-12

## 2018-09-11 RX ORDER — OXYTOCIN/0.9 % SODIUM CHLORIDE 30/500 ML
100-340 PLASTIC BAG, INJECTION (ML) INTRAVENOUS CONTINUOUS PRN
Status: COMPLETED | OUTPATIENT
Start: 2018-09-11 | End: 2018-09-12

## 2018-09-11 RX ADMIN — PENICILLIN G POTASSIUM 5 MILLION UNITS: 5000000 POWDER, FOR SOLUTION INTRAMUSCULAR; INTRAPLEURAL; INTRATHECAL; INTRAVENOUS at 19:13

## 2018-09-11 RX ADMIN — SODIUM CHLORIDE 2.5 MILLION UNITS: 9 INJECTION, SOLUTION INTRAVENOUS at 23:09

## 2018-09-11 RX ADMIN — BETAMETHASONE SODIUM PHOSPHATE AND BETAMETHASONE ACETATE 12 MG: 3; 3 INJECTION, SUSPENSION INTRA-ARTICULAR; INTRALESIONAL; INTRAMUSCULAR at 18:51

## 2018-09-11 RX ADMIN — SODIUM CHLORIDE, POTASSIUM CHLORIDE, SODIUM LACTATE AND CALCIUM CHLORIDE 50 ML/HR: 600; 310; 30; 20 INJECTION, SOLUTION INTRAVENOUS at 19:11

## 2018-09-11 RX ADMIN — OXYTOCIN-SODIUM CHLORIDE 0.9% IV SOLN 30 UNIT/500ML 2 MILLI-UNITS/MIN: 30-0.9/5 SOLUTION at 20:26

## 2018-09-11 RX ADMIN — MAGNESIUM SULFATE IN WATER 2 G/HR: 40 INJECTION, SOLUTION INTRAVENOUS at 19:53

## 2018-09-11 RX ADMIN — MAGNESIUM SULFATE HEPTAHYDRATE 4 G: 40 INJECTION, SOLUTION INTRAVENOUS at 19:24

## 2018-09-11 RX ADMIN — ACETAMINOPHEN 500 MG: 500 TABLET, FILM COATED ORAL at 16:58

## 2018-09-11 NOTE — NURSING NOTE
"36w4d    Chief Complaint   Patient presents with     Prenatal Care     headache lasting 2 days--blurred vision--nausea and dizzy--checked BP this morning and 157/91--took 3 times same result       Initial BP (!) 140/98  Ht 5' 4\" (1.626 m)  Wt 194 lb 14.4 oz (88.4 kg)  LMP 2017  BMI 33.45 kg/m2 Estimated body mass index is 33.45 kg/(m^2) as calculated from the following:    Height as of this encounter: 5' 4\" (1.626 m).    Weight as of this encounter: 194 lb 14.4 oz (88.4 kg).  BP completed using cuff size: regular        The following HM Due: NONE           "

## 2018-09-11 NOTE — PLAN OF CARE
Data: Patient presented to [facility] at 2018  2:23 PM.  Reason for maternal/fetal assessment is elevated blood pressures, HX of Pre eclampsia . Patient reports Nausea, blurred vision, epigastric pain, HA and increased Blood pressure .  Patient is a .  Prenatal record reviewed. Pregnancy has been has been uncomplicated. thus far.  Gestational Age 36w4d. VSS. Fetal movement present. Patient denies uterine contractions, leaking of vaginal fluid/rupture of membranes, vaginal bleeding, abdominal pain, pelvic pressure, significant edema. Support person is not present.   Action: Verbal consent for EFM. Triage assessment completed. Bill of rights reviewed.  Response: Patient verbalized agreement with plan. Will contact Dr Teri De Leon with update and further orders.

## 2018-09-11 NOTE — IP AVS SNAPSHOT
MRN:[id number]                      After Visit Summary   9/11/2018    Echo Mcgovern    MRN: [id number]           Thank you!     Thank you for choosing [facility] for your care. Our goal is always to provide you with excellent care. Hearing back from our patients is one way we can continue to improve our services. Please take a few minutes to complete the written survey that you may receive in the mail after you visit. If you would like to speak to someone directly about your visit please contact Patient Relations at [phone]. Thank you!          Patient Information     Date Of Birth          1995        Designated Caregiver       Most Recent Value    Caregiver    Will someone help with your care after discharge? no      About your hospital stay     You were admitted on:  September 11, 2018 You last received care in the:  [facility] Postpartum    You were discharged on:  September 14, 2018        Reason for your hospital stay       Maternity care                  Who to Call     For medical emergencies, please call 911.  For non-urgent questions about your medical care, please call your primary care provider or clinic, None          Attending Provider     Provider Specialty    Teri De Leon DO OB/Gyn    Ministerio Cardoso MD OB/Gyn       Primary Care Provider Fax #    Physician No Ref-Primary [phone]      After Care Instructions     Activity       Review discharge instructions            Diet       Resume previous diet            Discharge Instructions - Postpartum visit       Schedule postpartum visit with your provider and return to clinic in 6 weeks. Blood pressure check on Monday.                  Your next 10 appointments already scheduled     Sep 17, 2018 11:00 AM CDT   [facility]    [address]  [address]  [facility] [address] [phone]             Sep 24, 2018 10:45 AM CDT   ESTABLISHED PRENATAL with Jack Anne MD   [facility]    [address]  [address]  [facility] [address]   [phone]            Oct 01, 2018 10:15 AM CDT   ESTABLISHED PRENATAL with Jack Anne MD   [facility] ([facility])    [address]  [address]  [facility] [address]   [phone]              Further instructions from your care team       Postpartum Vaginal Delivery Instructions    Activity       Ask family and friends for help when you need it.    Do not place anything in your vagina for 6 weeks.    You are not restricted on other activities, but take it easy for a few weeks to allow your body to recover from delivery.  You are able to do any activities you feel up to that point.    No driving until you have stopped taking your pain medications (usually two weeks after delivery).     Call your health care provider if you have any of these symptoms:       Increased pain, swelling, redness, or fluid around your stiches from an episiotomy or perineal tear.    A fever above 100.4 F (38 C) with or without chills when placing a thermometer under your tongue.    You soak a sanitary pad with blood within 1 hour, or you see blood clots larger than a golf ball.    Bleeding that lasts more than 6 weeks.    Vaginal discharge that smells bad.    Severe pain, cramping or tenderness in your lower belly area.    A need to urinate more frequently (use the toilet more often), more urgently (use the toilet very quickly), or it burns when you urinate.    Nausea and vomiting.    Redness, swelling or pain around a vein in your leg.    Problems breastfeeding or a red or painful area on your breast.    Chest pain and cough or are gasping for air.    Problems coping with sadness, anxiety, or depression.  If you have any concerns about hurting yourself or the baby, call your  provider immediately.     You have questions or concerns after you return home.     Keep your hands clean:  Always wash your hands before touching your perineal area and stitches.  This helps reduce your risk of infection.  If your hands aren't dirty, you may use an alcohol hand-rub to clean your hands. Keep your nails clean and short.    Postpartum pain control:    You will likely experience cramping for 1-2 weeks.   You can take up to 3 tabs of ibuprofen (600mg) every 6 hours as needed for pain.  You can additionally take 1-2 tabs of tylenol (325-650mg regular strength 500-1000mg extra strength), every 6 hours for additional pain control as needed.  It is best to alternate your medications so you are taking something every 3 hours if you are having continued pain.    If you have vaginal pain you can take sitz baths 1-2x/day. Fill the tub with 2-3 inches of warm water and soak the perineal and vaginal area for 10 minutes. Ice or warm packs can also be applied for comfort.    Please call the office for:  Temperature above 100.4  Severe headache, especially with changes in your vision  Heavy bleeding (more than one pad an hour for more than 2 hours in a row)  Any other new, concerning symptoms.    Constipation  You may use the following products to ease constipation:    1. Stool softeners such as metamucil or benefiber  2. senna 1-2 times daily  3. Fiber supplements  4. miralax (over the counter).  5. Dulcolax    Please be sure to keep adequately hydrated; 6-8 8oz glasses daily, more if needed to compensate for exercise, sweating, etc.      More specific dosing can be found below:    - Metamucil 28g daily PO with 8oz of water  - senna-docusate 8.6-50 MG per tablet PO 1 tablet, Oral, 2 TIMES DAILY, Start with 1 tablet PO BID, reduce to 1 tablet daily when having daily BMs. Stop for loose stools.  - docusate sodium (COLACE) capsule 100 mg, Oral, 2 TIMES DAILY, To prevent constipation. Hold for loose  "stools.  - bisacodyl (DULCOLAX) suppository 10 mg, Rectal, DAILY PRN, constipation, Hold for loose stools.       Please contact the clinic with any questions.  Please schedule a follow up appointment with your primary OB/Gyn provider in 6 weeks.   You can respond with [employer] or call [place] at [phone].      Pending Results     No orders found from 9/9/2018 to 9/12/2018.            Statement of Approval     Ordered          09/14/18 1013  I have reviewed and agree with all the recommendations and orders detailed in this document.  EFFECTIVE NOW     Approved and electronically signed by:  Tita Mejia MD             Admission Information     Date & Time Provider Department Dept. Phone    9/11/2018 Ministerio Cardoso MD [facility] [phone]      Your Vitals Were     Blood Pressure Pulse Temperature Respirations Height Weight    121/67 95 98.5  F (36.9  C) (Oral) 18 1.626 m (5' 4\") 88 kg (194 lb)    Last Period Pulse Oximetry BMI (Body Mass Index)             12/29/2017 96% 33.3 kg/m2         [employer] Information     [employer] gives you secure access to your electronic health record. If you see a primary care provider, you can also send messages to your care team and make appointments. If you have questions, please call your primary care clinic.  If you do not have a primary care provider, please call [phone] and they will assist you.        Care EveryWhere ID     This is your Care EveryWhere ID. This could be used by other organizations to access your [place] medical records  [id number]        Equal Access to Services     ROBERT OATES : Hadii miranda cormiero Sohungali, waaxda luqadaha, qaybta kaalmada adeegyada, cyndi owens. So [facility] [phone].    ATENCIÓN: Si habla español, tiene a pickering disposición servicios gratuitos de asistencia lingüística. Llame al [phone].    We comply with applicable federal civil rights laws and [place] laws. We do not " discriminate on the basis of race, color, national origin, age, disability, sex, sexual orientation, or gender identity.               Review of your medicines      CONTINUE these medicines which have NOT CHANGED        Dose / Directions    prenatal multivitamin plus iron 27-0.8 MG Tabs per tablet        Dose:  1 tablet   Take 1 tablet by mouth daily   Quantity:  100 tablet   Refills:  3         STOP taking     ASPIRIN PO                    Protect others around you: Learn how to safely use, store and throw away your medicines at www.disposemymeds.org.             Medication List: This is a list of all your medications and when to take them. Check marks below indicate your daily home schedule. Keep this list as a reference.      Medications           Morning Afternoon Evening Bedtime As Needed    prenatal multivitamin plus iron 27-0.8 MG Tabs per tablet   Take 1 tablet by mouth daily

## 2018-09-11 NOTE — MR AVS SNAPSHOT
After Visit Summary   9/11/2018    Echo Mcgovern    MRN: [id number]           Patient Information     Date Of Birth          1995        Visit Information        Provider Department      9/11/2018 4:15 PM Jack Anne MD [facility]        Today's Diagnoses     Supervision of high risk pregnancy, antepartum    -  1    H/O pre-eclampsia in prior pregnancy, currently pregnant        Elevated blood pressure reading without diagnosis of hypertension          Care Instructions    You can reach your [place] Care Team any time of the day by calling [phone]. This number will put you in touch with the 24 hour nurse line if the clinic is closed.    To contact your OB/GYN Station Coordinator/Surgery Scheduler please call [phone]. This is a direct number for your care team between 8 a.m. and 4 p.m. Monday through Friday.    [facility] is open for your convenience:  Monday through Friday 8 a.m. to 6 p.m.  Closed weekends and all major holidays.            Follow-ups after your visit        Your next 10 appointments already scheduled     Sep 17, 2018 11:00 AM CDT   ESTABLISHED PRENATAL with Jack Anne MD   [facility] ([facility])    [address]  [address] [place] [address]   [phone]            Sep 24, 2018 10:45 AM CDT   ESTABLISHED PRENATAL with Jack Anne MD   [facility] ([facility])    [address]  [address] [place] [address]   [phone]            Oct 01, 2018 10:15 AM CDT   ESTABLISHED PRENATAL with Jack Anne MD   [facility] ([facility])    [address]  [address] [place] [address]   [phone]              Future tests that were ordered for you today     Open Standing Orders        Priority Remaining Interval Expires Ordered    Oxygen: Oxygen mask STAT 63096/24397 PRN  9/12/2018    Skin  care Routine 17285/22566 PRN  2018    Shower Routine 19888/58843 PRN  2018    Vital signs - IF Postpartum Hemorrhage Routine 77342/89812 PRN  2018    CBC with platelets differential STAT  CONDITIONAL X 1 STAT  2018    ABO/Rh type and screen STAT  CONDITIONAL X 1 STAT  2018    Fibrinogen activity STAT  CONDITIONAL X 1 STAT  2018    D dimer quantitative STAT  CONDITIONAL X 1 STAT  2018    INR STAT  CONDITIONAL X 1 STAT  2018    Partial thromboplastin time STAT  CONDITIONAL X 1 STAT  2018    Pulse oximetry nursing Routine 52856/17008 PRN  2018    Ice to affected area Routine 24202/03835 PRN  2018    Perineal skin care Routine 04842/57422 PRN  2018    Aqua K heating pad Routine  CONDITIONAL X 1  2018    Breast pump Routine 71048/73541 PRN  2018    Activity: Up ad kierra Routine 90618/72791 PRN  2018    Indwelling urinary catheter (Simpson) Routine 55357/78278 PRN  2018    Hemoglobin Routine  AM DRAW  2018    Activity: Bedrest Strict Routine 17049/12897 PRN  2018    Simpson catheter - IF Postpartum Hemorrhage Routine 48400/12005 PRN  2018    Intake and output - IF Postpartum Hemorrhage Routine 27277/18746 PRN  2018    Pulse oximetry nursing - IF Postpartum Hemorrhage Routine 39710/27641 PRN  2018    IV access - IF Postpartum Hemorrhage Routine 10501/91120 PRN  2018    Document - IF Postpartum Hemorrhage Routine 54447/54718 PRN  2018    Lactation IP Consult Routine  CONDITIONAL X 1  2018    Home Care Post Partum/[facility] IP Consult Routine  CONDITIONAL X 1  2018    AST Routine  AM DRAW  2018    ALT Routine  AM DRAW  2018    CBC with platelets Routine  AM DRAW  2018            Who to contact     If you have questions or need follow up information about today's clinic visit or your schedule please contact [facility] directly at  "[phone].  Normal or non-critical lab and imaging results will be communicated to you by MyChart, letter or phone within 4 business days after the clinic has received the results. If you do not hear from us within 7 days, please contact the clinic through [employer] or phone. If you have a critical or abnormal lab result, we will notify you by phone as soon as possible.  Submit refill requests through [employer] or call your pharmacy and they will forward the refill request to us. Please allow 3 business days for your refill to be completed.          Additional Information About Your Visit        [employer] Information     [employer] gives you secure access to your electronic health record. If you see a primary care provider, you can also send messages to your care team and make appointments. If you have questions, please call your primary care clinic.  If you do not have a primary care provider, please call [phone] and they will assist you.        Care EveryWhere ID     This is your Care EveryWhere ID. This could be used by other organizations to access your [place] medical records  [id number]        Your Vitals Were     Height Last Period BMI (Body Mass Index)             5' 4\" (1.626 m) 12/29/2017 33.45 kg/m2          Blood Pressure from Last 3 Encounters:   09/12/18 126/70   09/11/18 (!) 140/98   08/27/18 112/76    Weight from Last 3 Encounters:   09/11/18 194 lb (88 kg)   09/11/18 194 lb 14.4 oz (88.4 kg)   08/27/18 191 lb (86.6 kg)              We Performed the Following     ALT     AST     CBC with platelets differential     Creatinine     Urea nitrogen     Uric acid        Primary Care Provider Fax #    Physician No Ref-Primary [phone]       No address on file        Equal Access to Services     AISSATOU OATES : Jess Cruz, felicitas medellin, cyndi delgado. So [facility] [phone].    ATENCIÓN: Si habla español, tiene a pickering disposición " servicios gratuitos de asistencia lingüística. Susie smith [phone].    We comply with applicable federal civil rights laws and [place] laws. We do not discriminate on the basis of race, color, national origin, age, disability, sex, sexual orientation, or gender identity.            Thank you!     Thank you for choosing [facility]  for your care. Our goal is always to provide you with excellent care. Hearing back from our patients is one way we can continue to improve our services. Please take a few minutes to complete the written survey that you may receive in the mail after your visit with us. Thank you!             Your Updated Medication List - Protect others around you: Learn how to safely use, store and throw away your medicines at www.disposemymeds.org.          This list is accurate as of 9/11/18  2:23 PM.  Always use your most recent med list.                   Brand Name Dispense Instructions for use Diagnosis    ASPIRIN PO      Take 81 mg by mouth        prenatal multivitamin plus iron 27-0.8 MG Tabs per tablet     100 tablet    Take 1 tablet by mouth daily

## 2018-09-11 NOTE — IP AVS SNAPSHOT
[facility]    [address]    [facility] [address]    Phone:  [phone]    Fax:  [phone]                                       After Visit Summary   9/11/2018    Echo Mcgovern    MRN: [id number]           After Visit Summary Signature Page     I have received my discharge instructions, and my questions have been answered. I have discussed any challenges I see with this plan with the nurse or doctor.    ..........................................................................................................................................  Patient/Patient Representative Signature      ..........................................................................................................................................  Patient Representative Print Name and Relationship to Patient    ..................................................               ................................................  Date                                   Time    ..........................................................................................................................................  Reviewed by Signature/Title    ...................................................              ..............................................  Date                                               Time          22EPIC Rev 08/18

## 2018-09-11 NOTE — PATIENT INSTRUCTIONS
You can reach your [facility] Care Team any time of the day by calling [phone]. This number will put you in touch with the 24 hour nurse line if the clinic is closed.    To contact your OB/GYN Station Coordinator/Surgery Scheduler please call [phone]. This is a direct number for your care team between 8 a.m. and 4 p.m. Monday through Friday.    [facility] is open for your convenience:  Monday through Friday 8 a.m. to 6 p.m.  Closed weekends and all major holidays.

## 2018-09-11 NOTE — H&P
[facility] Labor and Delivery History and Physical    Echo Mcgovern MRN# [id number]   Age: 23 year old YOB: 1995     Date of Admission:  2018    Primary care provider: No Ref-Primary, Physician           Chief Complaint:   Echo Mcgovern is a 23 year old female who is  @  36w4d pregnant and being admitted for induction of labor, indication severe preeclampsia, by mild blood pressures and headache.           Pregnancy history:     OBSTETRIC HISTORY:    Obstetric History       T1      L1     SAB0   TAB0   Ectopic0   Multiple0   Live Births1       # Outcome Date GA Lbr Gregor/2nd Weight Sex Delivery Anes PTL Lv   2 Current            1 Term 16 37w0d  2.495 kg (5 lb 8 oz) F    LUCINDA      Name: Taylor      Complications: Preeclampsia      Obstetric Comments   H/o Preeclampsia 1st pregnancy, IOL @ 37 wks. Did not require tx with Magnesium.       EDC: Estimated Date of Delivery: Oct 5, 2018    Prenatal Labs:   Lab Results   Component Value Date    ABO A 2018    RH Pos 2018    AS Neg 2018    HEPBANG Nonreactive 2018    CHPCRT Negative 2018    GCPCRT Negative 2018    TREPAB Negative 2018    HGB 13.6 2018       GBS Status:   No results found for: GBS    Active Problem List  Patient Active Problem List   Diagnosis     H/O pre-eclampsia in prior pregnancy, currently pregnant     Supervision of high risk pregnancy, antepartum     Indication for care in labor or delivery     Severe preeclampsia       Medication Prior to Admission  Prescriptions Prior to Admission   Medication Sig Dispense Refill Last Dose     ASPIRIN PO Take 81 mg by mouth   9/10/2018 at Unknown time     Prenatal Vit-Fe Fumarate-FA (PRENATAL MULTIVITAMIN PLUS IRON) 27-0.8 MG TABS per tablet Take 1 tablet by mouth daily 100 tablet 3 9/10/2018 at Unknown time   .        Maternal Past Medical History:     Past Medical History:   Diagnosis Date     H/O  pre-eclampsia in prior pregnancy, currently pregnant                        Family History:   This patient has no significant family history            Social History:   This patient has no significant social history         Review of Systems:   CONSTITUTIONAL: NEGATIVE for fever, chills, change in weight  INTEGUMENTARY/SKIN: NEGATIVE for worrisome rashes, moles or lesions  EYES: NEGATIVE for vision changes or irritation  ENT/MOUTH: NEGATIVE for ear, mouth and throat problems  RESP: NEGATIVE for significant cough or SOB  BREAST: NEGATIVE for masses, tenderness or discharge  CV: NEGATIVE for chest pain, palpitations or peripheral edema  GI: NEGATIVE for nausea, abdominal pain, heartburn, or change in bowel habits  : NEGATIVE for frequency, dysuria, or hematuria  MUSCULOSKELETAL: NEGATIVE for significant arthralgias or myalgia  NEURO: NEGATIVE for weakness, dizziness or paresthesias  ENDOCRINE: NEGATIVE for temperature intolerance, skin/hair changes  HEME: NEGATIVE for bleeding problems  PSYCHIATRIC: NEGATIVE for changes in mood or affect          Physical Exam:     Vitals were reviewed  All vitals stable  Patient Vitals for the past 12 hrs:   BP Temp Temp src Pulse Resp SpO2 Height Weight   09/11/18 1930 136/90 - - - - - - -   09/11/18 1824 (!) 136/91 - - - - - - -   09/11/18 1810 128/85 - - - - 96 % - -   09/11/18 1800 127/82 - - - - 94 % - -   09/11/18 1750 129/86 - - - - 95 % - -   09/11/18 1740 134/86 - - - - 94 % - -   09/11/18 1734 133/85 - - - - - - -   09/11/18 1724 133/90 - - - - - - -   09/11/18 1700 139/86 - - - - 96 % - -   09/11/18 1648 (!) 136/93 - - - - - - -   09/11/18 1633 135/90 - - - - - - -   09/11/18 1631 - - - - - 95 % - -   09/11/18 1617 131/80 - - - - - - -   09/11/18 1600 129/89 - - - - 97 % - -   09/11/18 1547 124/86 - - - - - - -   09/11/18 1533 128/89 - - - - - - -   09/11/18 1518 136/90 - - - - - - -   09/11/18 1517 - - - - - 94 % - -   09/11/18 1516 - - - - - 96 % - -   09/11/18 1511 -  "- - - - 96 % - -   09/11/18 1507 - - - - - 94 % - -   09/11/18 1506 - - - - - 96 % - -   09/11/18 1501 - - - - - 96 % - -   09/11/18 1456 - - - - - 96 % - -   09/11/18 1451 - - - - - 96 % - -   09/11/18 1450 123/86 - - - - - - -   09/11/18 1448 - - - - - 94 % - -   09/11/18 1446 - - - - - 96 % - -   09/11/18 1442 123/87 - - - - - - -   09/11/18 1441 - - - - - 94 % - -   09/11/18 1440 125/89 98.4  F (36.9  C) Oral 92 18 - 1.626 m (5' 4\") 88 kg (194 lb)   09/11/18 1439 - - - - - 94 % - -   09/11/18 1436 125/89 - - - 18 95 % - -   09/11/18 1430 (!) 133/96 - - - - 96 % - -     Constitutional: Awake, alert, cooperative, no apparent distress, and appears stated age.  Eyes: Lids and lashes normal, pupils equal, round and reactive to light, extra ocular muscles intact, sclera clear, conjunctiva normal.  ENT: Normocephalic, without obvious abnormality, atramatic, sinuses nontender on palpation, external ears without lesions, oral pharynx with moist mucus membranes, tonsils without erythema or exudates, gums normal and good dentition.  Neck: Supple, symmetrical, trachea midline, no adenopathy, thyroid symmetric, not enlarged and no tenderness, skin normal.  Hematologic / Lymphatic: No cervical lymphadenopathy and no supraclavicular lymphadenopathy.  Back: Symmetric, no curvature, spinous processes are non-tender on palpation, paraspinous muscles are non-tender on palpation, no costal vertebral tenderness.  Lungs: No increased work of breathing, good air exchange, clear to auscultation bilaterally, no crackles or wheezing.  Cardiovascular: Regular rate and rhythm, normal S1 and S2, no S3 or S4, and no murmur noted.  Abdomen: Gravid  Genitourinary: No urethral discharge, normal external genitalia, no hernia.  Musculoskeletal: No redness, warmth, or swelling of the joints.  Full range of motion noted.  Motor strength is 5 out of 5 all extremities bilaterally.  Tone is normal.  Neurologic: Awake, alert, oriented to name, place " and time.  Cranial nerves II-XII are grossly intact.  Motor is 5 out of 5 bilaterally.  Cerebellar finger to nose, heel to shin intact.  Sensory is intact.  Babinski down going, Romberg negative, and gait is normal.  Neuropsychiatric: Normal affect, mood, orientation, memory and insight.  Skin: No rashes, erythema, pallor, petechia or purpura.   Cervix:   Membranes: intact   Dilation: 2.5   Effacement: 50%   Station:-1   Consistency: soft   Position: Mid  Presentation:Cephalic  Fetal Heart Rate Tracing: Tier 1 (normal)  Tocometer: frequency q 1-2 minutes                       Assessment:   Echo Mcgovern is a 23 year old female who is  @  36w4d pregnant and being admitted for induction of labor, indication severe preeclampsia, by mild blood pressures and headache criteria.   GBS unknown, prematurity.           Plan:   IOL for severe preeclampsia with pitocin to be started after PCN given, AROM after unknown GBS treated.   GBS unknown:  PCN   Severe preeclampsia:  Magnesium sulfate, monitor blood pressures.   Prematurity:  Betamethasone x 2 if not delivered in 24 hours    Teri De Leon DO

## 2018-09-12 PROBLEM — R03.0 ELEVATED BLOOD PRESSURE READING WITHOUT DIAGNOSIS OF HYPERTENSION: Status: ACTIVE | Noted: 2018-09-12

## 2018-09-12 LAB
ALT SERPL W P-5'-P-CCNC: 14 U/L (ref 0–50)
ALT SERPL W P-5'-P-CCNC: 16 U/L (ref 0–50)
AST SERPL W P-5'-P-CCNC: 17 U/L (ref 0–45)
AST SERPL W P-5'-P-CCNC: 20 U/L (ref 0–45)
CREAT SERPL-MCNC: 0.57 MG/DL (ref 0.52–1.04)
ERYTHROCYTE [DISTWIDTH] IN BLOOD BY AUTOMATED COUNT: 12.6 % (ref 10–15)
ERYTHROCYTE [DISTWIDTH] IN BLOOD BY AUTOMATED COUNT: 12.7 % (ref 10–15)
GFR SERPL CREATININE-BSD FRML MDRD: >90 ML/MIN/1.7M2
HCT VFR BLD AUTO: 38.9 % (ref 35–47)
HCT VFR BLD AUTO: 39.8 % (ref 35–47)
HGB BLD-MCNC: 13.4 G/DL (ref 11.7–15.7)
HGB BLD-MCNC: 13.5 G/DL (ref 11.7–15.7)
MAGNESIUM SERPL-MCNC: 5 MG/DL (ref 1.6–2.3)
MCH RBC QN AUTO: 32.4 PG (ref 26.5–33)
MCH RBC QN AUTO: 32.6 PG (ref 26.5–33)
MCHC RBC AUTO-ENTMCNC: 33.9 G/DL (ref 31.5–36.5)
MCHC RBC AUTO-ENTMCNC: 34.4 G/DL (ref 31.5–36.5)
MCV RBC AUTO: 95 FL (ref 78–100)
MCV RBC AUTO: 95 FL (ref 78–100)
PLATELET # BLD AUTO: 193 10E9/L (ref 150–450)
PLATELET # BLD AUTO: 202 10E9/L (ref 150–450)
RBC # BLD AUTO: 4.11 10E12/L (ref 3.8–5.2)
RBC # BLD AUTO: 4.17 10E12/L (ref 3.8–5.2)
T PALLIDUM AB SER QL: NONREACTIVE
URATE SERPL-MCNC: 5.4 MG/DL (ref 2.6–6)
WBC # BLD AUTO: 12.2 10E9/L (ref 4–11)
WBC # BLD AUTO: 15.2 10E9/L (ref 4–11)

## 2018-09-12 PROCEDURE — 84450 TRANSFERASE (AST) (SGOT): CPT | Performed by: OBSTETRICS & GYNECOLOGY

## 2018-09-12 PROCEDURE — 84550 ASSAY OF BLOOD/URIC ACID: CPT | Performed by: OBSTETRICS & GYNECOLOGY

## 2018-09-12 PROCEDURE — 84460 ALANINE AMINO (ALT) (SGPT): CPT | Performed by: OBSTETRICS & GYNECOLOGY

## 2018-09-12 PROCEDURE — 85027 COMPLETE CBC AUTOMATED: CPT | Performed by: FAMILY MEDICINE

## 2018-09-12 PROCEDURE — 36415 COLL VENOUS BLD VENIPUNCTURE: CPT | Performed by: OBSTETRICS & GYNECOLOGY

## 2018-09-12 PROCEDURE — 36415 COLL VENOUS BLD VENIPUNCTURE: CPT | Performed by: FAMILY MEDICINE

## 2018-09-12 PROCEDURE — 84460 ALANINE AMINO (ALT) (SGPT): CPT | Performed by: FAMILY MEDICINE

## 2018-09-12 PROCEDURE — 12000031 ZZH R&B OB CRITICAL

## 2018-09-12 PROCEDURE — 25000128 H RX IP 250 OP 636: Performed by: FAMILY MEDICINE

## 2018-09-12 PROCEDURE — 25000128 H RX IP 250 OP 636: Performed by: OBSTETRICS & GYNECOLOGY

## 2018-09-12 PROCEDURE — 83735 ASSAY OF MAGNESIUM: CPT | Performed by: FAMILY MEDICINE

## 2018-09-12 PROCEDURE — 85027 COMPLETE CBC AUTOMATED: CPT | Performed by: OBSTETRICS & GYNECOLOGY

## 2018-09-12 PROCEDURE — 25000128 H RX IP 250 OP 636: Performed by: ANESTHESIOLOGY

## 2018-09-12 PROCEDURE — 59400 OBSTETRICAL CARE: CPT | Performed by: OBSTETRICS & GYNECOLOGY

## 2018-09-12 PROCEDURE — 84450 TRANSFERASE (AST) (SGOT): CPT | Performed by: FAMILY MEDICINE

## 2018-09-12 PROCEDURE — 25000125 ZZHC RX 250: Performed by: FAMILY MEDICINE

## 2018-09-12 PROCEDURE — 25000128 H RX IP 250 OP 636

## 2018-09-12 PROCEDURE — 25000125 ZZHC RX 250: Performed by: OBSTETRICS & GYNECOLOGY

## 2018-09-12 PROCEDURE — 82565 ASSAY OF CREATININE: CPT | Performed by: FAMILY MEDICINE

## 2018-09-12 PROCEDURE — 25000132 ZZH RX MED GY IP 250 OP 250 PS 637: Performed by: FAMILY MEDICINE

## 2018-09-12 PROCEDURE — 72200001 ZZH LABOR CARE VAGINAL DELIVERY SINGLE

## 2018-09-12 RX ORDER — ACETAMINOPHEN 325 MG/1
650 TABLET ORAL EVERY 4 HOURS PRN
Status: DISCONTINUED | OUTPATIENT
Start: 2018-09-12 | End: 2018-09-14 | Stop reason: HOSPADM

## 2018-09-12 RX ORDER — LABETALOL 100 MG/1
100 TABLET, FILM COATED ORAL EVERY 12 HOURS SCHEDULED
Status: DISCONTINUED | OUTPATIENT
Start: 2018-09-12 | End: 2018-09-12 | Stop reason: CLARIF

## 2018-09-12 RX ORDER — FENTANYL CITRATE 50 UG/ML
INJECTION, SOLUTION INTRAMUSCULAR; INTRAVENOUS
Status: COMPLETED
Start: 2018-09-12 | End: 2018-09-12

## 2018-09-12 RX ORDER — SODIUM CHLORIDE, SODIUM LACTATE, POTASSIUM CHLORIDE, CALCIUM CHLORIDE 600; 310; 30; 20 MG/100ML; MG/100ML; MG/100ML; MG/100ML
INJECTION, SOLUTION INTRAVENOUS CONTINUOUS
Status: DISCONTINUED | OUTPATIENT
Start: 2018-09-13 | End: 2018-09-14 | Stop reason: HOSPADM

## 2018-09-12 RX ORDER — HYDRALAZINE HYDROCHLORIDE 20 MG/ML
10 INJECTION INTRAMUSCULAR; INTRAVENOUS
Status: DISCONTINUED | OUTPATIENT
Start: 2018-09-12 | End: 2018-09-14 | Stop reason: HOSPADM

## 2018-09-12 RX ORDER — MAGNESIUM SULFATE HEPTAHYDRATE 40 MG/ML
4 INJECTION, SOLUTION INTRAVENOUS
Status: DISCONTINUED | OUTPATIENT
Start: 2018-09-12 | End: 2018-09-14 | Stop reason: HOSPADM

## 2018-09-12 RX ORDER — MAGNESIUM SULFATE HEPTAHYDRATE 500 MG/ML
4 INJECTION, SOLUTION INTRAMUSCULAR; INTRAVENOUS
Status: DISCONTINUED | OUTPATIENT
Start: 2018-09-12 | End: 2018-09-14 | Stop reason: HOSPADM

## 2018-09-12 RX ORDER — HYDROCORTISONE 2.5 %
CREAM (GRAM) TOPICAL 3 TIMES DAILY PRN
Status: DISCONTINUED | OUTPATIENT
Start: 2018-09-12 | End: 2018-09-14 | Stop reason: HOSPADM

## 2018-09-12 RX ORDER — DOCUSATE SODIUM 100 MG/1
100 CAPSULE, LIQUID FILLED ORAL 2 TIMES DAILY
Status: DISCONTINUED | OUTPATIENT
Start: 2018-09-12 | End: 2018-09-14 | Stop reason: HOSPADM

## 2018-09-12 RX ORDER — LORAZEPAM 2 MG/ML
2 INJECTION INTRAMUSCULAR
Status: DISCONTINUED | OUTPATIENT
Start: 2018-09-12 | End: 2018-09-14 | Stop reason: HOSPADM

## 2018-09-12 RX ORDER — MAGNESIUM SULFATE IN WATER 40 MG/ML
2 INJECTION, SOLUTION INTRAVENOUS CONTINUOUS
Status: DISPENSED | OUTPATIENT
Start: 2018-09-12 | End: 2018-09-13

## 2018-09-12 RX ORDER — OXYTOCIN/0.9 % SODIUM CHLORIDE 30/500 ML
100 PLASTIC BAG, INJECTION (ML) INTRAVENOUS CONTINUOUS
Status: DISCONTINUED | OUTPATIENT
Start: 2018-09-12 | End: 2018-09-14 | Stop reason: HOSPADM

## 2018-09-12 RX ORDER — LANOLIN 100 %
OINTMENT (GRAM) TOPICAL
Status: DISCONTINUED | OUTPATIENT
Start: 2018-09-12 | End: 2018-09-14 | Stop reason: HOSPADM

## 2018-09-12 RX ORDER — MISOPROSTOL 200 UG/1
800 TABLET ORAL
Status: DISCONTINUED | OUTPATIENT
Start: 2018-09-12 | End: 2018-09-14 | Stop reason: HOSPADM

## 2018-09-12 RX ORDER — OXYTOCIN/0.9 % SODIUM CHLORIDE 30/500 ML
340 PLASTIC BAG, INJECTION (ML) INTRAVENOUS CONTINUOUS PRN
Status: DISCONTINUED | OUTPATIENT
Start: 2018-09-12 | End: 2018-09-14 | Stop reason: HOSPADM

## 2018-09-12 RX ORDER — METHYLERGONOVINE MALEATE 0.2 MG/ML
200 INJECTION INTRAVENOUS
Status: DISCONTINUED | OUTPATIENT
Start: 2018-09-12 | End: 2018-09-14 | Stop reason: HOSPADM

## 2018-09-12 RX ORDER — CALCIUM GLUCONATE 94 MG/ML
1 INJECTION, SOLUTION INTRAVENOUS
Status: DISCONTINUED | OUTPATIENT
Start: 2018-09-12 | End: 2018-09-14 | Stop reason: HOSPADM

## 2018-09-12 RX ORDER — CARBOPROST TROMETHAMINE 250 UG/ML
250 INJECTION, SOLUTION INTRAMUSCULAR
Status: DISCONTINUED | OUTPATIENT
Start: 2018-09-12 | End: 2018-09-14 | Stop reason: HOSPADM

## 2018-09-12 RX ORDER — HYDRALAZINE HYDROCHLORIDE 20 MG/ML
5 INJECTION INTRAMUSCULAR; INTRAVENOUS
Status: DISCONTINUED | OUTPATIENT
Start: 2018-09-12 | End: 2018-09-14 | Stop reason: HOSPADM

## 2018-09-12 RX ADMIN — OXYTOCIN-SODIUM CHLORIDE 0.9% IV SOLN 30 UNIT/500ML 340 ML/HR: 30-0.9/5 SOLUTION at 13:57

## 2018-09-12 RX ADMIN — SODIUM CHLORIDE 2.5 MILLION UNITS: 9 INJECTION, SOLUTION INTRAVENOUS at 03:17

## 2018-09-12 RX ADMIN — ONDANSETRON 4 MG: 2 INJECTION INTRAMUSCULAR; INTRAVENOUS at 09:35

## 2018-09-12 RX ADMIN — OXYTOCIN-SODIUM CHLORIDE 0.9% IV SOLN 30 UNIT/500ML 100 ML/HR: 30-0.9/5 SOLUTION at 15:49

## 2018-09-12 RX ADMIN — SODIUM CHLORIDE, POTASSIUM CHLORIDE, SODIUM LACTATE AND CALCIUM CHLORIDE 75 ML/HR: 600; 310; 30; 20 INJECTION, SOLUTION INTRAVENOUS at 03:17

## 2018-09-12 RX ADMIN — Medication 15 ML: at 01:57

## 2018-09-12 RX ADMIN — FENTANYL CITRATE 100 MCG: 50 INJECTION INTRAMUSCULAR; INTRAVENOUS at 12:37

## 2018-09-12 RX ADMIN — ACETAMINOPHEN 650 MG: 325 TABLET, FILM COATED ORAL at 10:28

## 2018-09-12 RX ADMIN — MAGNESIUM SULFATE IN WATER 2 G/HR: 40 INJECTION, SOLUTION INTRAVENOUS at 04:46

## 2018-09-12 RX ADMIN — SODIUM CHLORIDE 2.5 MILLION UNITS: 9 INJECTION, SOLUTION INTRAVENOUS at 12:41

## 2018-09-12 RX ADMIN — MAGNESIUM SULFATE HEPTAHYDRATE 2 G/HR: 40 INJECTION, SOLUTION INTRAVENOUS at 15:26

## 2018-09-12 RX ADMIN — SODIUM CHLORIDE 2.5 MILLION UNITS: 9 INJECTION, SOLUTION INTRAVENOUS at 07:58

## 2018-09-12 RX ADMIN — Medication: at 02:15

## 2018-09-12 RX ADMIN — ONDANSETRON 4 MG: 2 INJECTION INTRAMUSCULAR; INTRAVENOUS at 01:19

## 2018-09-12 RX ADMIN — IBUPROFEN 800 MG: 800 TABLET ORAL at 14:49

## 2018-09-12 NOTE — PLAN OF CARE
Feeling generalized weakness, nasal congestion, general warmth feeling, headache same, contractions less, ok to start pitocin per Dr De Leon

## 2018-09-12 NOTE — ANESTHESIA PROCEDURE NOTES
Peripheral nerve/Neuraxial procedure note : epidural catheter  Pre-Procedure  Performed by ELVIS DE LA CRUZ  Referred by MAGALY  Location: OB      Pre-Anesthestic Checklist: patient identified, IV checked, risks and benefits discussed, informed consent, monitors and equipment checked, pre-op evaluation and at physician/surgeon's request    Timeout  Correct Patient: Yes   Correct Procedure: Yes   Correct Site: Yes   Correct Laterality: N/A   Correct Position: Yes   Site Marked: N/A   .   Procedure Documentation    .    Procedure:    Epidural catheter.     .  .       Assessment/Narrative  .  .  . Comments:  Pre-Procedure  Performed by Elvis De La Cruz MD  Location: OB.      PreAnesthestic Checklist: patient identified, IV checked, risks and benefits discussed, informed consent obtained, monitors and equipment checked, pre-op evaluation and at physician/surgeon's request.    Timeout   Correct Patient: Yes  Correct Procedure: Epidural catheter placement  Correct Site: Yes   Correct Position: Yes    Procedure Documentation  Procedure:   Epidural catheter block for Labor    Patient currently in labor and she and OBMD request a labor epidural to control her labor pains. Patient was interviewed and examined. Procedure and risks including but not limited to bleeding, infection, nerve injury, paralysis, PDPH, and inadequate block requiring intervention discussed with patient. Questions answered. This epidural is to be placed in anticipation of vaginal delivery.  She consents to the epidural procedure.  Time-out was performed.  I or my partners remain immediately available for management of any issues or complications and will monitor at appropriate intervals.  Procedure: Patient sitting. Betadine prep x 3. Sterile drape applied.  Mask and gloves used.  Lidocaine 1%  local infiltration at L 3-4.  17 G. Tuohy needle at L3-4 by loss of resistance into epidural space.  No CSF, paresthesia or blood. 1.5 % Lidocaine with 1:200,000  Epinephrine 5cc test dose. Epidural catheter inserted w/o resistance to 5 cm in epidural space. Then 0.125% bupivicaine 15 cc with NS 5 cc.  Aspiration negative for blood and CSF.   Negative for neuro change, paresthesia or symptoms of intravascular injection or intrathecal injection.  Infusion orders written and infusion of 0.125% bupivicaine 15cc per hour started.    Elvis De La Cruz MD

## 2018-09-12 NOTE — PROGRESS NOTES
.Data: Echo Mcgovern transferred to postpartum room 434 via wheelchair at 1545. Baby transferred via parent's arms.  Action: Receiving unit notified of transfer: Yes. Patient and family notified of room change. Report given to Rosa Maria AMADOR RN at bedside. Belongings sent to receiving unit. Accompanied by Registered Nurse. Oriented patient to surroundings. Call light within reach. ID bands double-checked with receiving RN.  Response: Patient tolerated transfer and is stable.

## 2018-09-12 NOTE — PLAN OF CARE
Bedside handoff received from Princess FERNANDEZ. Assumed cares. Magnesium infusing at 2Gm/hour. 0745- positioned to right side.Admits to headache, denies need for medicine. Declines cold pack. Category 1 fetal tracing. Reflexes +2.

## 2018-09-12 NOTE — PROVIDER NOTIFICATION
09/11/18 1822   Provider Notification   Provider Name/Title Dr De Leon   Method of Notification At Bedside   Request Evaluate in Person     MD at bedside to evaluate, patient continues to complain of persistent headache despite tylenol administration, SVE slightly changed since exam 2 hour ago, patient continues to contract regularly and is feeling them.    Due to her persistent headache and mildly elevated blood pressures, induction recommended.  Order for magnesium, betamethasone, penicillin for GBS unknown, will get penicillin on board prior to rupture of membranes.  
   09/11/18 1903   Provider Notification   Provider Name/Title Dr. De Leon   Method of Notification Phone   Request Evaluate - Remote   Notification Reason Lab/Diagnostic Study   Md notified of PIH lab results, BP's fht's, contraction pattern and SVE.  Pt also c/o HA and some blurred vision.  MD gave orders to give a dose of tylenol and see if her HA improves if it does not improve MD will come and see pt.  
   09/12/18 0142   Provider Notification   Provider Name/Title MDA   Method of Notification At Bedside   Request Evaluate in Person   Notification Reason Pain   MDA at bedside to place epidural.   
   09/12/18 0258   Provider Notification   Provider Name/Title Dr. De Leon   Method of Notification Phone   Request Evaluate - Remote   Notification Reason Status Update   Updated  assumed around 0100, Epidural received, pit now at 10mU, cx 2-4 minutes apart, SVE 3.5/80/-1 BOW intact, BP stable, no clonus, reflexes WDL, 2 doses of pcn received. Labs scheduled for 0600. No new orders received.  
   09/12/18 1235   Provider Notification   Provider Name/Title Dr. Sweeney   Method of Notification At Bedside   Request Evaluate - Remote   Notification Reason Pain     
   09/12/18 1340   Provider Notification   Provider Name/Title Dr. Cardoso   Method of Notification Phone   Asked to come fpr delivery.  
"   09/12/18 0343   Provider Notification   Provider Name/Title Dr. De Leon   Method of Notification Phone   Updated MD that writer noted Mag was running at 3g/hr (75ml/hr) when cares were assumed. Pt exhibited no signs of magnesium toxicity and mag was immediately turned down to 2g/hr (50ml/hr). Pt continues to have a headache but denies epigastric pain or \"floaters\" in vision. Pt does report intermittent blurry vision that began when mag was started. Orders received to turn mag to 1g/hr and order a stat mag level. If not therapeutic (5-8) increase back to 2g/hr. Text page MD the results.  "
"   09/12/18 0443   Provider Notification   Provider Name/Title Dr. De Leon   Method of Notification Electronic Page   Text page to MD \" Mag Level: 5. I increased mag back to 2g/hr.  HTN labs were drawn early  ALT/ AST: 16/17  Plt: 193  Creatinine: 0.57\"  "
9113- Dr. Cardoso updated per phone. Little change in cervix. 17 johanna units of Pitocin infusing. Bloody show. IUPC inserted. Will come see patient.  
35

## 2018-09-12 NOTE — ANESTHESIA PREPROCEDURE EVALUATION
PAC NOTE:       ANESTHESIA PRE EVALUATION:  Anesthesia Evaluation       history and physical reviewed .      No history of anesthetic complications          ROS/MED HX    ENT/Pulmonary:  - neg pulmonary ROS     Neurologic:       Cardiovascular:  - neg cardiovascular ROS       METS/Exercise Tolerance:     Hematologic:         Musculoskeletal:         GI/Hepatic:         Renal/Genitourinary:         Endo:         Psychiatric:         Infectious Disease:         Malignancy:         Other:                     Physical Exam      Airway   Mallampati: II  TM distance: > 3 FB  Neck ROM: full    Dental     Cardiovascular       Pulmonary           (+) pre-eclampsia          Anesthesia Plan      History & Physical Review      ASA Status:  .  OB Epidural Asa: 3            Postoperative Care      Consents  Anesthetic plan, risks, benefits and alternatives discussed with:  Patient..                            .

## 2018-09-12 NOTE — PROGRESS NOTES
Pt seen at bedside.  Has epidural in place.  Now C/O increased pain.  Level is at T10.  Pt given 8ml of 0.5% bupiv + 100 mcg fentanyl as re-bolus.  No complications.  Got good relief.  Start time:1230  End time:1242    Ministerio Sweeney MD

## 2018-09-13 LAB
ALT SERPL W P-5'-P-CCNC: 15 U/L (ref 0–50)
AST SERPL W P-5'-P-CCNC: 19 U/L (ref 0–45)
ERYTHROCYTE [DISTWIDTH] IN BLOOD BY AUTOMATED COUNT: 12.8 % (ref 10–15)
HCT VFR BLD AUTO: 34.3 % (ref 35–47)
HGB BLD-MCNC: 11.7 G/DL (ref 11.7–15.7)
MCH RBC QN AUTO: 33 PG (ref 26.5–33)
MCHC RBC AUTO-ENTMCNC: 34.1 G/DL (ref 31.5–36.5)
MCV RBC AUTO: 97 FL (ref 78–100)
PLATELET # BLD AUTO: 174 10E9/L (ref 150–450)
RBC # BLD AUTO: 3.55 10E12/L (ref 3.8–5.2)
WBC # BLD AUTO: 12.6 10E9/L (ref 4–11)

## 2018-09-13 PROCEDURE — 84460 ALANINE AMINO (ALT) (SGPT): CPT | Performed by: OBSTETRICS & GYNECOLOGY

## 2018-09-13 PROCEDURE — 36415 COLL VENOUS BLD VENIPUNCTURE: CPT | Performed by: OBSTETRICS & GYNECOLOGY

## 2018-09-13 PROCEDURE — 84450 TRANSFERASE (AST) (SGOT): CPT | Performed by: OBSTETRICS & GYNECOLOGY

## 2018-09-13 PROCEDURE — 25000132 ZZH RX MED GY IP 250 OP 250 PS 637: Performed by: OBSTETRICS & GYNECOLOGY

## 2018-09-13 PROCEDURE — 25000128 H RX IP 250 OP 636: Performed by: OBSTETRICS & GYNECOLOGY

## 2018-09-13 PROCEDURE — 85027 COMPLETE CBC AUTOMATED: CPT | Performed by: OBSTETRICS & GYNECOLOGY

## 2018-09-13 PROCEDURE — 12000027 ZZH R&B OB

## 2018-09-13 PROCEDURE — 12000031 ZZH R&B OB CRITICAL

## 2018-09-13 RX ADMIN — MAGNESIUM SULFATE HEPTAHYDRATE 2 G/HR: 40 INJECTION, SOLUTION INTRAVENOUS at 00:38

## 2018-09-13 RX ADMIN — SODIUM CHLORIDE, POTASSIUM CHLORIDE, SODIUM LACTATE AND CALCIUM CHLORIDE: 600; 310; 30; 20 INJECTION, SOLUTION INTRAVENOUS at 12:46

## 2018-09-13 RX ADMIN — MAGNESIUM SULFATE HEPTAHYDRATE 2 G/HR: 40 INJECTION, SOLUTION INTRAVENOUS at 10:25

## 2018-09-13 RX ADMIN — DOCUSATE SODIUM 100 MG: 100 CAPSULE, LIQUID FILLED ORAL at 08:26

## 2018-09-13 RX ADMIN — DOCUSATE SODIUM 100 MG: 100 CAPSULE, LIQUID FILLED ORAL at 21:08

## 2018-09-13 RX ADMIN — SODIUM CHLORIDE, POTASSIUM CHLORIDE, SODIUM LACTATE AND CALCIUM CHLORIDE 75 ML/HR: 600; 310; 30; 20 INJECTION, SOLUTION INTRAVENOUS at 00:43

## 2018-09-13 ASSESSMENT — ACTIVITIES OF DAILY LIVING (ADL)
TOILETING: 0-->INDEPENDENT
BATHING: 0-->INDEPENDENT
SWALLOWING: 0-->SWALLOWS FOODS/LIQUIDS WITHOUT DIFFICULTY
COGNITION: 0 - NO COGNITION ISSUES REPORTED
RETIRED_EATING: 0-->INDEPENDENT
AMBULATION: 0-->INDEPENDENT
DRESS: 0-->INDEPENDENT
RETIRED_COMMUNICATION: 0-->UNDERSTANDS/COMMUNICATES WITHOUT DIFFICULTY
FALL_HISTORY_WITHIN_LAST_SIX_MONTHS: NO
TRANSFERRING: 0-->INDEPENDENT

## 2018-09-13 NOTE — ANESTHESIA POSTPROCEDURE EVALUATION
Patient: Echo Mcgovern    * No procedures listed *    Diagnosis:* No pre-op diagnosis entered *  Diagnosis Additional Information: Labor pain    Anesthesia Type:  Epidural    Note:  Anesthesia Post Evaluation    Last vitals:  Vitals:    09/12/18 2000 09/13/18 0038 09/13/18 0442   BP: 126/77 99/55 122/69   Pulse: 85 83 74   Resp: 18 16 16   Temp: 97.9  F (36.6  C) 97.6  F (36.4  C) 97.9  F (36.6  C)   SpO2:            Electronically Signed By: Richardson Hernandez MD  September 13, 2018  7:22 AM    S/P epidural for labor.   I or my partner was immediately available for management of this patient during epidural analgesia infusion.  VSS.  Doing well. Block resolved.  Neuro at baseline. Denies positional headache. Minimal side effects easily managed w/ PRN meds. No apparent anesthetic complications. No follow-up required.    Richardson Hernandez MD   Manda Jamil  (PCA)  2018 12:06:08

## 2018-09-13 NOTE — PROGRESS NOTES
Postpartum Progress Note  Echo Mcgovern  [id number]    Subjective:   Patiet doing well overall, feeling fatigued with magnesium sulfate. Pain well controlled with POs. Denies nausea and vomiting. Ambulating without difficulty. Adequate PO intake. Breast feeding.     Objective:  Vitals:    18 0038 18 0442 18 0818 18 1203   BP: 99/55 122/69 132/71 107/50   Pulse: 83 74     Resp:    Temp: 97.6  F (36.4  C) 97.9  F (36.6  C) 97.9  F (36.6  C)    TempSrc: Oral Axillary Oral    SpO2:       Weight:       Height:          General: resting comfortably, in NAD  Heart: regular rate, well perfused  Lungs: non-labored breathing, no cough  Abdomen: soft, non distended, appropriately tender to palpation, FF at U -2  Extremities: moderate edema, non-tender to palpation    Labs:  Hemoglobin   Date Value Ref Range Status   2018 11.7 11.7 - 15.7 g/dL Final   2018 13.5 11.7 - 15.7 g/dL Final       Assessment/Plan:  23 year old  who is PPD#1 s/p  following IOL for preeclampsia with SF.  Currently stable and doing well  - Routine post-partum cares  - Preeclampsia: mag sulfate off at 1pm today. Labetalol 100mg d/c'd  - Heme: stable, no s/sx ABLA  - Pain: continue tylenol, ibuprofen, ice packs, hot packs as needed  - Baby: in room doing well  - Dispo: d/c to home tomorrow if BPs stable and asymptomatic from a preeclampsia standpoint.     Jennifer Mishra MD  OB/GYN

## 2018-09-13 NOTE — PLAN OF CARE
"Problem: Postpartum (Vaginal Delivery) (Adult,Obstetrics,Pediatric)  Goal: Signs and Symptoms of Listed Potential Problems Will be Absent, Minimized or Managed (Postpartum)  Signs and symptoms of listed potential problems will be absent, minimized or managed by discharge/transition of care (reference Postpartum (Vaginal Delivery) (Adult,Obstetrics,Pediatric) CPG).   Vitals signs WNL, reflexes +2 bilaterally, no clonus. Patient stable on magnesium. Continues to complain of \"foggy vision\" but denies epigastric pain or headache. Up with standby assist while magnesium running, up ad kierra, independent after discontinuation of magnesium. Voiding large amounts, tolerating large amounts of PO intake. Denies pain and declines offers of pain medications.       "

## 2018-09-13 NOTE — LACTATION NOTE
This note was copied from a baby's chart.  LC follow up.  Her baby has been nursing well and often, but is LPT and had one borderline and one low sugar.  Parents report infant pictures attempt in which baby was crying steadily prior to low blood sugar.  LC increased room temperature, assisted with latch on both sides, and used HE to offer drops of colostrum post feeding.  He gulps when nursing and an abundance of colostrum noted.  3cc of EBM were easily expressed post feeding.  Plan for frequent feeds and initiation of pumping if borderline blood sugars continue.

## 2018-09-13 NOTE — PROGRESS NOTES
Patient feeling better this shift. Patient states she is less dizzy when getting up. Patient still complains of blurry vision. Voiding without difficulty, using stand by assist. Bonding well with . No ibuprofen ordered, due to elevated BP's. Patient to continue mag infusion for 24 hours after delivery. Labs scheduled for AM. Patient complaining of nipple tenderness, using mother love cream and hydrogel pads.

## 2018-09-13 NOTE — PLAN OF CARE
"Problem: Patient Care Overview  Goal: Plan of Care/Patient Progress Review  Outcome: Improving  Pt. VSS. Magnesium infusing. Pt. denies symptoms of preeclampsia but states she feels \"foggy\". Reflexes normal, no clonus. Pt. declined pain medications stating she was comfortable. Independent in infant cares. Stand by assist to the bathroom, ambulating well, denies dizziness. Voiding in large amounts. Breastfeeding infant. Attentive to infant needs and bonding well with infant.      "

## 2018-09-14 VITALS
OXYGEN SATURATION: 96 % | DIASTOLIC BLOOD PRESSURE: 67 MMHG | SYSTOLIC BLOOD PRESSURE: 121 MMHG | RESPIRATION RATE: 18 BRPM | TEMPERATURE: 98.5 F | HEIGHT: 64 IN | HEART RATE: 95 BPM | BODY MASS INDEX: 33.12 KG/M2 | WEIGHT: 194 LBS

## 2018-09-14 PROCEDURE — 25000132 ZZH RX MED GY IP 250 OP 250 PS 637: Performed by: OBSTETRICS & GYNECOLOGY

## 2018-09-14 RX ADMIN — ACETAMINOPHEN 650 MG: 325 TABLET, FILM COATED ORAL at 04:32

## 2018-09-14 RX ADMIN — DOCUSATE SODIUM 100 MG: 100 CAPSULE, LIQUID FILLED ORAL at 12:19

## 2018-09-14 NOTE — PLAN OF CARE
Problem: Patient Care Overview  Goal: Plan of Care/Patient Progress Review  Outcome: Improving  Patient meeting expected outcomes. Tylenol x1 which was adequate in managing pain. Breastfeeding going well, pumping after some feeds. Independent with self and  cares.

## 2018-09-14 NOTE — DISCHARGE INSTRUCTIONS
Postpartum Vaginal Delivery Instructions    Activity       Ask family and friends for help when you need it.    Do not place anything in your vagina for 6 weeks.    You are not restricted on other activities, but take it easy for a few weeks to allow your body to recover from delivery.  You are able to do any activities you feel up to that point.    No driving until you have stopped taking your pain medications (usually two weeks after delivery).     Call your health care provider if you have any of these symptoms:       Increased pain, swelling, redness, or fluid around your stiches from an episiotomy or perineal tear.    A fever above 100.4 F (38 C) with or without chills when placing a thermometer under your tongue.    You soak a sanitary pad with blood within 1 hour, or you see blood clots larger than a golf ball.    Bleeding that lasts more than 6 weeks.    Vaginal discharge that smells bad.    Severe pain, cramping or tenderness in your lower belly area.    A need to urinate more frequently (use the toilet more often), more urgently (use the toilet very quickly), or it burns when you urinate.    Nausea and vomiting.    Redness, swelling or pain around a vein in your leg.    Problems breastfeeding or a red or painful area on your breast.    Chest pain and cough or are gasping for air.    Problems coping with sadness, anxiety, or depression.  If you have any concerns about hurting yourself or the baby, call your provider immediately.     You have questions or concerns after you return home.     Keep your hands clean:  Always wash your hands before touching your perineal area and stitches.  This helps reduce your risk of infection.  If your hands aren't dirty, you may use an alcohol hand-rub to clean your hands. Keep your nails clean and short.    Postpartum pain control:    You will likely experience cramping for 1-2 weeks.   You can take up to 3 tabs of ibuprofen (600mg) every 6 hours as needed for pain.  You  can additionally take 1-2 tabs of tylenol (325-650mg regular strength 500-1000mg extra strength), every 6 hours for additional pain control as needed.  It is best to alternate your medications so you are taking something every 3 hours if you are having continued pain.    If you have vaginal pain you can take sitz baths 1-2x/day. Fill the tub with 2-3 inches of warm water and soak the perineal and vaginal area for 10 minutes. Ice or warm packs can also be applied for comfort.    Please call the office for:  Temperature above 100.4  Severe headache, especially with changes in your vision  Heavy bleeding (more than one pad an hour for more than 2 hours in a row)  Any other new, concerning symptoms.    Constipation  You may use the following products to ease constipation:    1. Stool softeners such as metamucil or benefiber  2. senna 1-2 times daily  3. Fiber supplements  4. miralax (over the counter).  5. Dulcolax    Please be sure to keep adequately hydrated; 6-8 8oz glasses daily, more if needed to compensate for exercise, sweating, etc.      More specific dosing can be found below:    - Metamucil 28g daily PO with 8oz of water  - senna-docusate 8.6-50 MG per tablet PO 1 tablet, Oral, 2 TIMES DAILY, Start with 1 tablet PO BID, reduce to 1 tablet daily when having daily BMs. Stop for loose stools.  - docusate sodium (COLACE) capsule 100 mg, Oral, 2 TIMES DAILY, To prevent constipation. Hold for loose stools.  - bisacodyl (DULCOLAX) suppository 10 mg, Rectal, DAILY PRN, constipation, Hold for loose stools.       Please contact the clinic with any questions.  Please schedule a follow up appointment with your primary OB/Gyn provider in 6 weeks.   You can respond with Jinn or call [place] at [phone].

## 2018-09-14 NOTE — DISCHARGE SUMMARY
[facility]    Discharge Summary  Obstetrics    Date of Admission:  2018  Date of Discharge:  2018  Discharging Provider: Tita Mejia    Discharge Diagnoses   Preeclampsia     History of Present Illness   Echo Mcgovern is a 23 year old female who presented for induction of labor for preeclampsia with severe features (mild hypertension with headache). Uncomplicated delivery.    Hospital Course   The patient's hospital course was unremarkable and her blood pressure quickly normalized without medication.  She recovered as anticipated and experienced no post-delivery complications.  On discharge, her pain was well controlled. Vaginal bleeding is similar to peak menstrual flow.  Voiding without difficulty.  Ambulating well and tolerating a normal diet.  No fevers.  Infant is stable.  She was discharged on post-partum day #2 with plans for follow up for blood pressure check in clinic next week.    Post-partum hemoglobin:   Hemoglobin   Date Value Ref Range Status   2018 11.7 11.7 - 15.7 g/dL Final       Tita Mejia    Discharge Disposition   Discharged to home   Condition at discharge: Stable    Primary Care Physician   Physician No Ref-Primary    Consultations This Hospital Stay   ANESTHESIOLOGY IP CONSULT  LACTATION IP CONSULT  HOME CARE POST PARTUM/ IP CONSULT    Discharge Orders     Activity   Review discharge instructions     Reason for your hospital stay   Maternity care     Discharge Instructions - Postpartum visit   Schedule postpartum visit with your provider and return to clinic in 6 weeks. Blood pressure check on Monday.     Diet   Resume previous diet       Discharge Medications   Current Discharge Medication List      CONTINUE these medications which have NOT CHANGED    Details   Prenatal Vit-Fe Fumarate-FA (PRENATAL MULTIVITAMIN PLUS IRON) 27-0.8 MG TABS per tablet Take 1 tablet by mouth daily  Qty: 100 tablet, Refills: 3         STOP taking these medications        ASPIRIN PO Comments:   Reason for Stopping:             Allergies   No Known Allergies

## 2018-09-14 NOTE — PROGRESS NOTES
Patient meeting expected goals this shift. Able to do all self cares and cares for . Declines pain meds. Voiding without difficulty. Declines preeclampsia symptoms. Plan to discharge tomorrow.

## 2018-09-14 NOTE — LACTATION NOTE
This note was copied from a baby's chart.  LC to see patient.  Her baby continues to nurse well and often.  Some concerns noted due to weight loss and LPT gestation.  LC recommended continuing to pump post feeds and offer all EBM back to baby until her milk comes in.  No other questions noted.  She is aware she may call prn.

## 2018-09-14 NOTE — PLAN OF CARE
Problem: Patient Care Overview  Goal: Plan of Care/Patient Progress Review  Outcome: Adequate for Discharge Date Met: 09/14/18  Patient states understanding of education, discharge teaching, instructions, follow-up plan of care, and denies any further questions of me at the time of discharge. Patient affect is bright, and she is discharged ambulatory in stable condition with baby, and FOB, escorted by me.

## 2018-09-17 ENCOUNTER — OFFICE VISIT (OUTPATIENT)
Dept: OBGYN | Facility: CLINIC | Age: 23
End: 2018-09-17
Payer: COMMERCIAL

## 2018-09-17 VITALS — BODY MASS INDEX: 30.55 KG/M2 | SYSTOLIC BLOOD PRESSURE: 120 MMHG | DIASTOLIC BLOOD PRESSURE: 82 MMHG | WEIGHT: 178 LBS

## 2018-09-17 DIAGNOSIS — R03.0 ELEVATED BLOOD PRESSURE READING WITHOUT DIAGNOSIS OF HYPERTENSION: ICD-10-CM

## 2018-09-17 PROCEDURE — 99207 ZZC POST PARTUM EXAM: CPT | Performed by: OBSTETRICS & GYNECOLOGY

## 2018-09-17 NOTE — NURSING NOTE
"Chief Complaint   Patient presents with     Follow Up For            Initial /82  Wt 178 lb (80.7 kg)  LMP 2017  BMI 30.55 kg/m2 Estimated body mass index is 30.55 kg/(m^2) as calculated from the following:    Height as of 18: 5' 4\" (1.626 m).    Weight as of this encounter: 178 lb (80.7 kg).  BP completed using cuff size: regular        The following HM Due: NONE      The following patient reported/Care Every where data was sent to:  P ABSTRACT QUALITY INITIATIVES [[id number]]      Yandy Bermudez CMA                   "

## 2018-09-17 NOTE — PATIENT INSTRUCTIONS
You can reach your [place] Care Team any time of the day by calling [phone]. This number will put you in touch with the 24 hour nurse line if the clinic is closed.    To contact your OB/GYN Station Coordinator/Surgery Scheduler please call [phone]. This is a direct number for your care team between 8 a.m. and 4 p.m. Monday through Friday.    [facility] is open for your convenience:  Monday through Friday 8 a.m. to 6 p.m.  Closed weekends and all major holidays.

## 2018-09-17 NOTE — MR AVS SNAPSHOT
After Visit Summary   9/17/2018    Echo Mcgovern    MRN: [id number]           Patient Information     Date Of Birth          1995        Visit Information        Provider Department      9/17/2018 11:00 AM Jack Anne MD [facility]        Today's Diagnoses     Postpartum state    -  1    Elevated blood pressure reading without diagnosis of hypertension          Care Instructions    You can reach your [place] Care Team any time of the day by calling [phone]. This number will put you in touch with the 24 hour nurse line if the clinic is closed.    To contact your OB/GYN Station Coordinator/Surgery Scheduler please call [phone]. This is a direct number for your care team between 8 a.m. and 4 p.m. Monday through Friday.    [facility] is open for your convenience:  Monday through Friday 8 a.m. to 6 p.m.  Closed weekends and all major holidays.            Follow-ups after your visit        Follow-up notes from your care team     Return in about 1 month (around 10/17/2018).      Your next 10 appointments already scheduled     Oct 22, 2018 11:00 AM CDT   Post Partum with Jack Anne MD   [facility] ([facility]    [address]  [address]  [facility] [address] [phone]              Who to contact     If you have questions or need follow up information about today's clinic visit or your schedule please contact [facility] directly at [phone].  Normal or non-critical lab and imaging results will be communicated to you by MyChart, letter or phone within 4 business days after the clinic has received the results. If you do not hear from us within 7 days, please contact the clinic through MyChart or phone. If you have a critical or abnormal lab result, we will notify you by phone as soon as possible.  Submit refill requests through [facility] or call your pharmacy and they will forward the refill  request to us. Please allow 3 business days for your refill to be completed.          Additional Information About Your Visit        MyChart Information     [facility] gives you secure access to your electronic health record. If you see a primary care provider, you can also send messages to your care team and make appointments. If you have questions, please call your primary care clinic.  If you do not have a primary care provider, please call [phone] and they will assist you.        Care EveryWhere ID     This is your Care EveryWhere ID. This could be used by other organizations to access your [facility] medical records  [id number]        Your Vitals Were     Last Period BMI (Body Mass Index)                12/29/2017 30.55 kg/m2           Blood Pressure from Last 3 Encounters:   09/17/18 120/82   09/14/18 121/67   09/11/18 (!) 140/98    Weight from Last 3 Encounters:   09/17/18 178 lb (80.7 kg)   09/11/18 194 lb (88 kg)   09/11/18 194 lb 14.4 oz (88.4 kg)              Today, you had the following     No orders found for display       Primary Care Provider Fax #    Physician No Ref-Primary [phone]       No address on file        Equal Access to Services     ROBERT OATES : Hadii miranda darden hadasho Sohungali, waaxda luqadaha, qaybta kaalmada adeegyada, cyndi shelton . So [facility] [phone].    ATENCIÓN: Si habla español, tiene a pickering disposición servicios gratuitos de asistencia lingüística. Herreraame al [phone].    We comply with applicable federal civil rights laws and [place] laws. We do not discriminate on the basis of race, color, national origin, age, disability, sex, sexual orientation, or gender identity.            Thank you!     Thank you for choosing [facility]  for your care. Our goal is always to provide you with excellent care. Hearing back from our patients is one way we can continue to improve our services. Please take a few minutes to complete the written  survey that you may receive in the mail after your visit with us. Thank you!             Your Updated Medication List - Protect others around you: Learn how to safely use, store and throw away your medicines at www.disposemymeds.org.          This list is accurate as of 9/17/18  2:26 PM.  Always use your most recent med list.                   Brand Name Dispense Instructions for use Diagnosis    prenatal multivitamin plus iron 27-0.8 MG Tabs per tablet     100 tablet    Take 1 tablet by mouth daily

## 2018-09-17 NOTE — PROGRESS NOTES
Echo Mcgovern is a 23 year old female  2012 underwent an induction of labor at 36-5/7 weeks for symptoms of preeclampsia with headaches and elevated blood pressures.  The patient states that she is feeling much better today.  She gave birth to a healthy male infant.  Bowel and bladder function are working well she denies any headaches blurred vision or right upper quadrant discomfort.  She has normal bowel bladder function.  Minimal lochia    Past Medical History:   Diagnosis Date     H/O pre-eclampsia in prior pregnancy, currently pregnant      Past Surgical History:   Procedure Laterality Date     HAND SURGERY  2015    right hand- fractured hand, had 2 screws placed     KNEE SURGERY  2011    Torn ACL, Left knee     /82  Wt 178 lb (80.7 kg)  LMP 2017  BMI 30.55 kg/m2  Constitutional: healthy, alert and no distress  Cardiovascular: negative, PMI normal. No lifts, heaves, or thrills. RRR. No murmurs, clicks gallops or rub  Respiratory: negative, Percussion normal. Good diaphragmatic excursion. Lungs clear  Gastrointestinal: Abdomen soft, non-tender. BS normal. No masses, organomegaly  Neurologic: Gait normal. Reflexes normal and symmetric. Sensation grossly WNL.    (Z39.2) Postpartum state  (primary encounter diagnosis)  Comment: Doing well.  I will see her back 6 weeks after delivery for a routine postpartum visit.  Postpartum restrictions and symptoms of concern were discussed she will call.  Plan: Plan given    (R03.0) Elevated blood pressure reading without diagnosis of hypertension  Comment: Blood pressures have now normalized.  Peripheral edema has resolved  Plan: Patient can resume a more normal lifestyle.  Symptoms of concern discussed she will call.  In 6 weeks will do a routine postpartum visit

## 2018-10-04 NOTE — OP NOTE
Echo Mcgovern #[id number] (CSN:[id number])  (23 year old F)  (Adm: 18)     [id number]         Roby Mcgovern [[id number]]       Labor Event Times    Labor onset date: 18 Onset time:  1:00 AM   Dilation complete date: 18 Complete time:  1:35 PM   Start pushing date/time: 2018 1340                  Labor Length    1st Stage (hrs): 12 (min): 35   2nd Stage (hrs): 0 (min): 16   3rd Stage (hrs): 0 (min): 4            Labor Events (274607)     labor?: No    steroids: None   Labor Type: Induction   Predominate monitoring during 1st stage: continuous electronic fetal monitoring                     Rupture identifier: Rupture 1   Rupture date/time: 18 0800   Rupture type: Spontaneous rupture of membranes occuring during spontaneous labor or augmentation   Fluid color: Clear   Fluid odor: Normal            Induction: Oxytocin   Indications for induction: Severe Preeclampsia                        Delivery/Placenta Date and Time    Delivery Date: 18 Delivery Time:  1:51 PM   Placenta Date/Time: 2018  1:55 PM            Vaginal Counts    Initial count performed by 2 team members:   Two Team Members   Dr Walker ROJAS RN           Needles Suture [place] Sponges Instruments   Initial counts 2  5    Added to count       Final counts 2  5       Placed during labor Accounted for at the end of labor   NA NA   Yes Yes   NA NA      Final count performed by 2 team members:   Two Team Members   Dr Walker Santana RN          Final count correct?: Yes               Apgars    Living status: Living    1 Minute 5 Minute 10 Minute 15 Minute 20 Minute   Skin color: 0  1       Heart rate: 2  2       Reflex irritability: 2  2       Muscle tone: 2  2       Respiratory effort: 2  2       Total: 8  9          Apgars assigned by: SARAH BENITEZ RN            Cord    Vessels: 3 Vessels Complications: None   Cord Blood Disposition: Lab Gases Sent?: No               [facility] - [id number]      "            Measurements  Most recent update: 2018 12:45 PM by Luanne Shelton, RN     Temp Ht Wt Head Circumference           99.6  F (37.6  C) (Axillary) 0.483 m (1' 7\") (20 %)* 2.554 kg (5 lb 10.1 oz) (3 %)* 5.22\" (13.3 cm) (<1 %)*       *Growth percentiles are based on WHO (Boys, 0-2 years) data.            Skin to Skin and Feeding Plan    Skin to skin initiation date/time: 18 1400   Skin to skin with: Mother   Skin to skin end date/time:     Breastfeeding initiated date/time: 2018 1425   How do you plan to feed your baby: Breastfeeding            Labor Events and Shoulder Dystocia    Fetal Tracing Prior to Delivery: Category 1   Shoulder dystocia present?: Neg                  Delivery (Maternal) (Provider to Complete) (758222)    Episiotomy: None   Perineal lacerations: None    Periurethral laceration: bilateral Repaired?: No   Vaginal laceration?: No    Cervical laceration?: No                Mother's Information  Mother: Echo Mcgovern #[id number]      Start of Mother's Information      IO Blood Loss  18 0100 - 18 1351     Mom's I/O Activity Total QBL Blood Loss (mL) Hospital Encounter 100 mL   Total  100              End of Mother's Information  Mother: Echo Mcgovern #[id number]                  Delivery - Provider to Complete ([id number])    Delivering clinician: CHELLY MOHAMUD   Delivery Type (Choose the 1 that will go to the Birth History): Vaginal, Spontaneous Delivery                     Other personnel:   Provider Role   LAWRENCE VENEGAS, KOURTNEY ASHRAF                  Placenta    Delayed Cord Clamping: Done   Date/Time: 2018  1:55 PM   Removal: Spontaneous   Disposition: Hospital disposal           Anesthesia    Method: Epidural              Presentation and Position    Presentation: Vertex   Position: Middle Occiput Anterior                     "

## 2018-10-22 ENCOUNTER — PRENATAL OFFICE VISIT (OUTPATIENT)
Dept: OBGYN | Facility: CLINIC | Age: 23
End: 2018-10-22
Payer: COMMERCIAL

## 2018-10-22 VITALS — BODY MASS INDEX: 29.52 KG/M2 | DIASTOLIC BLOOD PRESSURE: 80 MMHG | WEIGHT: 172 LBS | SYSTOLIC BLOOD PRESSURE: 124 MMHG

## 2018-10-22 DIAGNOSIS — L30.9 DERMATITIS: ICD-10-CM

## 2018-10-22 DIAGNOSIS — Z12.4 SCREENING FOR MALIGNANT NEOPLASM OF CERVIX: ICD-10-CM

## 2018-10-22 DIAGNOSIS — Z23 NEED FOR PROPHYLACTIC VACCINATION AND INOCULATION AGAINST INFLUENZA: ICD-10-CM

## 2018-10-22 PROCEDURE — 90686 IIV4 VACC NO PRSV 0.5 ML IM: CPT | Performed by: OBSTETRICS & GYNECOLOGY

## 2018-10-22 PROCEDURE — G0145 SCR C/V CYTO,THINLAYER,RESCR: HCPCS | Performed by: OBSTETRICS & GYNECOLOGY

## 2018-10-22 PROCEDURE — 99207 ZZC POST PARTUM EXAM: CPT | Performed by: OBSTETRICS & GYNECOLOGY

## 2018-10-22 PROCEDURE — 90471 IMMUNIZATION ADMIN: CPT | Performed by: OBSTETRICS & GYNECOLOGY

## 2018-10-22 RX ORDER — CLOTRIMAZOLE AND BETAMETHASONE DIPROPIONATE 10; .64 MG/G; MG/G
CREAM TOPICAL 2 TIMES DAILY
Qty: 30 G | Refills: 1 | Status: SHIPPED | OUTPATIENT
Start: 2018-10-22 | End: 2020-07-21

## 2018-10-22 RX ORDER — NORGESTIMATE AND ETHINYL ESTRADIOL 7DAYSX3 LO
1 KIT ORAL DAILY
Qty: 84 TABLET | Refills: 1 | Status: SHIPPED | OUTPATIENT
Start: 2018-10-22 | End: 2018-11-27

## 2018-10-22 NOTE — PROGRESS NOTES
SUBJECTIVE: Echo is here for a 6-week postpartum checkup.    Date of Last Pap:  2/17/2016    Delivery date was 9/12/2018. She had a IOL at 36.4 weeks in a pregnancy complicated by severe preeclampsia with elevated blood pressures and headache with the birth of a viable boy, weight 6 pounds 3.5 oz., with none complications.  Since delivery, she has not been breast feeding.  She has no signs of infection, bleeding or other complications.  We discussed contraceptions and she has chosen Nexplanon.  She  has not had intercourse since delivery and complains of No discomfort. Patient screened for postpartum depression and complaints are NEGATIVE. Screening has also been completed for intimate partner violence.  I reviewed with her her past history of preeclampsia and hypertension and the potential risks in the future as she gets older.  She will continue to monitor her blood pressure and call for elevations.    EXAM:    GENERAL APPEARANCE: healthy, alert and no distress     EYES: EOMI, PERRL     HENT: ear canals and TM's normal and nose and mouth without ulcers or lesions     NECK: no adenopathy, no asymmetry, masses, or scars and thyroid normal to palpation     RESP: lungs clear to auscultation - no rales, rhonchi or wheezes     BREAST: normal without masses, tenderness or nipple discharge and no palpable axillary masses or adenopathy     CV: regular rates and rhythm, normal S1 S2, no S3 or S4 and no murmur, click or rub     ABDOMEN:  soft, nontender, no HSM or masses and bowel sounds normal     : normal cervix, adnexae, and uterus without masses or discharge and rectal exam normal without masses-guaiac negative stool     MS: extremities normal- no gross deformities noted, no evidence of inflammation in joints, FROM in all extremities.     SKIN: no suspicious lesions or rashes     NEURO: Normal strength and tone, sensory exam grossly normal, mentation intact and speech normal     PSYCH: mentation appears normal.  and affect normal/bright     LYMPHATICS: No cervical adenopathy    ASSESSMENT:   Normal postpartum exam after induced vaginal delivery and history of preeclampsia.  Now doing well.  Patient would like to use OCPs in the interval until she can come in for the implant.   Instrucitons and indications for backup were thoroughly rev.  All her ?'s were answered.  Patient will monitor her blood pressure and call for elevations.  She has no absolute contraindications to OCPs.  Patient would like a flu shot today.    PLAN:  Return as needed or at time of next expected pap, pelvic, or breast exam.      Injectable Influenza Immunization Documentation    1.  Is the person to be vaccinated sick today?   No    2. Does the person to be vaccinated have an allergy to a component   of the vaccine?   No  Egg Allergy Algorithm Link    3. Has the person to be vaccinated ever had a serious reaction   to influenza vaccine in the past?   No    4. Has the person to be vaccinated ever had Guillain-Barré syndrome?   No    Form completed by Yandy Bermudez [facility]

## 2018-10-22 NOTE — NURSING NOTE
"Chief Complaint   Patient presents with     Postpartum Care       Initial /80  Wt 172 lb (78 kg)  LMP 2017  BMI 29.52 kg/m2 Estimated body mass index is 29.52 kg/(m^2) as calculated from the following:    Height as of 18: 5' 4\" (1.626 m).    Weight as of this encounter: 172 lb (78 kg).  BP completed using cuff size: regular    Questioned patient about current smoking habits.  Pt. has never smoked.          The following HM Due: pap smear      The following patient reported/Care Every where data was sent to:  P ABSTRACT QUALITY INITIATIVES [[id number]]        orders have been placed    Yandy Bermudez CMA                 "

## 2018-10-22 NOTE — MR AVS SNAPSHOT
After Visit Summary   10/22/2018    Echo Mcgovern    MRN: [id number]           Patient Information     Date Of Birth          1995        Visit Information        Provider Department      10/22/2018 11:00 AM Jack Anne MD [facility]        Today's Diagnoses     Routine postpartum follow-up    -  1    Need for prophylactic vaccination and inoculation against influenza        Screening for malignant neoplasm of cervix        Dermatitis          Care Instructions    You can reach your [facility] Care Team any time of the day by calling [phone]. This number will put you in touch with the 24 hour nurse line if the clinic is closed.    To contact your OB/GYN Station Coordinator/Surgery Scheduler please call [phone]. This is a direct number for your care team between 8 a.m. and 4 p.m. Monday through Friday.    [facility] is open for your convenience:  Monday through Friday 8 a.m. to 6 p.m.  Closed weekends and all major holidays.            Follow-ups after your visit        Your next 10 appointments already scheduled     Oct 26, 2018  1:30 PM CDT   Office Visit with Kaia lGass CNM   [facility] ([facility])    [address]  [address]  [facility] [address] [phone]           Bring a current list of meds and any records pertaining to this visit. For Physicals, please bring immunization records and any forms needing to be filled out. Please arrive 10 minutes early to complete paperwork.              Who to contact     If you have questions or need follow up information about today's clinic visit or your schedule please contact [facility] directly at [phone].  Normal or non-critical lab and imaging results will be communicated to you by MyChart, letter or phone within 4 business days after the clinic has received the results. If you do not hear from us within 7 days, please contact the  clinic through [employer] or phone. If you have a critical or abnormal lab result, we will notify you by phone as soon as possible.  Submit refill requests through [employer] or call your pharmacy and they will forward the refill request to us. Please allow 3 business days for your refill to be completed.          Additional Information About Your Visit        [facility] Information     [employer] gives you secure access to your electronic health record. If you see a primary care provider, you can also send messages to your care team and make appointments. If you have questions, please call your primary care clinic.  If you do not have a primary care provider, please call [phone] and they will assist you.        Care EveryWhere ID     This is your Care EveryWhere ID. This could be used by other organizations to access your [place] medical records  [id number]        Your Vitals Were     Last Period BMI (Body Mass Index)                12/29/2017 29.52 kg/m2           Blood Pressure from Last 3 Encounters:   10/22/18 124/80   09/17/18 120/82   09/14/18 121/67    Weight from Last 3 Encounters:   10/22/18 172 lb (78 kg)   09/17/18 178 lb (80.7 kg)   09/11/18 194 lb (88 kg)              We Performed the Following     FLU VACCINE, SPLIT VIRUS, IM (QUADRIVALENT) [08216]- >3 YRS     Pap imaged thin layer screen reflex to HPV if ASCUS - recommend age 25 - 29          Today's Medication Changes          These changes are accurate as of 10/22/18  1:24 PM.  If you have any questions, ask your nurse or doctor.               Start taking these medicines.        Dose/Directions    clotrimazole-betamethasone cream   Commonly known as:  LOTRISONE   Used for:  Dermatitis   Started by:  Jack Anne MD        Apply topically 2 times daily   Quantity:  30 g   Refills:  1       norgestim-eth estrad triphasic 0.18/0.215/0.25 MG-25 MCG per tablet   Commonly known as:  ORTHO TRI-CYCLEN LO   Used for:  Routine postpartum follow-up   Started  by:  Jack Anne MD        Dose:  1 tablet   Take 1 tablet by mouth daily   Quantity:  84 tablet   Refills:  1            Where to get your medicines      These medications were sent to [facility] [id number] [facility] [address]     Phone:  [phone]     clotrimazole-betamethasone cream    norgestim-eth estrad triphasic 0.18/0.215/0.25 MG-25 MCG per tablet                Primary Care Provider Fax #    Physician No Ref-Primary [phone]       No address on file        Equal Access to Services     [facility]: Hadii aad ku hadasho Soomaali, waaxda luqadaha, qaybta kaalmada adegiovanniyavon, cyndi shelton . So [facility] [phone].    ATENCIÓN: Si habla español, tiene a pickering disposición servicios gratuitos de asistencia lingüística. [facility] [phone].    We comply with applicable federal civil rights laws and [place] laws. We do not discriminate on the basis of race, color, national origin, age, disability, sex, sexual orientation, or gender identity.            Thank you!     Thank you for choosing [facility]  for your care. Our goal is always to provide you with excellent care. Hearing back from our patients is one way we can continue to improve our services. Please take a few minutes to complete the written survey that you may receive in the mail after your visit with us. Thank you!             Your Updated Medication List - Protect others around you: Learn how to safely use, store and throw away your medicines at www.disposemymeds.org.          This list is accurate as of 10/22/18  1:24 PM.  Always use your most recent med list.                   Brand Name Dispense Instructions for use Diagnosis    clotrimazole-betamethasone cream    LOTRISONE    30 g    Apply topically 2 times daily    Dermatitis       norgestim-eth estrad triphasic 0.18/0.215/0.25 MG-25 MCG per tablet    ORTHO TRI-CYCLEN LO     84 tablet    Take 1 tablet by mouth daily    Routine postpartum follow-up       prenatal multivitamin plus iron 27-0.8 MG Tabs per tablet     100 tablet    Take 1 tablet by mouth daily

## 2018-10-22 NOTE — PATIENT INSTRUCTIONS
You can reach your [facility] Care Team any time of the day by calling [phone]. This number will put you in touch with the 24 hour nurse line if the clinic is closed.    To contact your OB/GYN Station Coordinator/Surgery Scheduler please call [phone]. This is a direct number for your care team between 8 a.m. and 4 p.m. Monday through Friday.    [facility] is open for your convenience:  Monday through Friday 8 a.m. to 6 p.m.  Closed weekends and all major holidays.

## 2018-10-23 ASSESSMENT — PATIENT HEALTH QUESTIONNAIRE - PHQ9: SUM OF ALL RESPONSES TO PHQ QUESTIONS 1-9: 0

## 2018-10-24 LAB
COPATH REPORT: NORMAL
PAP: NORMAL

## 2018-10-26 ENCOUNTER — OFFICE VISIT (OUTPATIENT)
Dept: OBGYN | Facility: CLINIC | Age: 23
End: 2018-10-26
Payer: COMMERCIAL

## 2018-10-26 VITALS
WEIGHT: 172 LBS | BODY MASS INDEX: 29.37 KG/M2 | DIASTOLIC BLOOD PRESSURE: 72 MMHG | SYSTOLIC BLOOD PRESSURE: 118 MMHG | HEIGHT: 64 IN

## 2018-10-26 DIAGNOSIS — Z30.017 NEXPLANON INSERTION: Primary | ICD-10-CM

## 2018-10-26 PROCEDURE — 11981 INSERTION DRUG DLVR IMPLANT: CPT | Performed by: ADVANCED PRACTICE MIDWIFE

## 2018-10-26 NOTE — MR AVS SNAPSHOT
After Visit Summary   10/26/2018    Echo Mcgovern    MRN: [id number]           Patient Information     Date Of Birth          1995        Visit Information        Provider Department      10/26/2018 1:30 PM Kaia Glass CNM [facility]        Today's Diagnoses     Nexplanon insertion    -  1      Care Instructions    What Nexplanon Users May Expect    Nexplanon is well tolerated and has a low early-removal rate.    Insertion site complications, such as prolonged pain or infection, are rare. Removal is occasionally difficult, and rarely requires a surgical procedure in the operating room.    Menstrual changes are common with Nexplanon. Bleeding may become more or less frequent or heavy, or absent. The bleeding pattern after the first three months is predictive of future bleeding, but the pattern may change at any time. Average bleeding is 18 days over 3 months. Over 50% of women experience rare over absent bleeding over the two year period, while 25% experience frequent or prolonged bleeding.    In clinical studies, users gained 3.7 pounds over two years. It is unknown what portion of this weight gain is related to Nexplanon    Women with a history of depressed mood may have worsening on Nexplanon, and may need to have the device removed.    Return to baseline ovulation patterns is seen 7-14 days after removal of Nexplanon.    Rarely, headaches and acne have also led to device removal.    Nexplanon may be less effective in women who weight more than 130% of their ideal body weight.    Nexplanon does not protect against HIV or STDs.    Use a back-up method of birth control for the first 7 days after insertion of Nexplanon.    Please call [facility] at [phone] if you have questions or concerns.    For more complete information:  [email or web address]          Follow-ups after your visit        Follow-up notes from your  "care team     Return if symptoms worsen or fail to improve.      Who to contact     If you have questions or need follow up information about today's clinic visit or your schedule please contact [facility] directly at [phone].  Normal or non-critical lab and imaging results will be communicated to you by MyChart, letter or phone within 4 business days after the clinic has received the results. If you do not hear from us within 7 days, please contact the clinic through MyChart or phone. If you have a critical or abnormal lab result, we will notify you by phone as soon as possible.  Submit refill requests through [employer] or call your pharmacy and they will forward the refill request to us. Please allow 3 business days for your refill to be completed.          Additional Information About Your Visit        [employer] Information     [employer] gives you secure access to your electronic health record. If you see a primary care provider, you can also send messages to your care team and make appointments. If you have questions, please call your primary care clinic.  If you do not have a primary care provider, please call [phone] and they will assist you.        Care EveryWhere ID     This is your Care EveryWhere ID. This could be used by other organizations to access your [place] medical records  [id number]        Your Vitals Were     Height Last Period Breastfeeding? BMI (Body Mass Index)          5' 4\" (1.626 m) (LMP Unknown) No 29.52 kg/m2         Blood Pressure from Last 3 Encounters:   10/26/18 118/72   10/22/18 124/80   09/17/18 120/82    Weight from Last 3 Encounters:   10/26/18 172 lb (78 kg)   10/22/18 172 lb (78 kg)   09/17/18 178 lb (80.7 kg)              Today, you had the following     No orders found for display       Primary Care Provider Fax #    Physician No Ref-Primary [phone]       No address on file        Equal Access to Services     AISSATOU MARTIN: Jess workman " Nancy, marjda luemmaadaha, qaybta kathomas simms, cyndi jersonin hayaan beckygiovanni renéederek lazitager roman. So [facility] [phone].    ATENCIÓN: Si hong dickson, tiene a pickering disposición servicios gratuitos de asistencia lingüística. Susie al [phone].    We comply with applicable federal civil rights laws and [place] laws. We do not discriminate on the basis of race, color, national origin, age, disability, sex, sexual orientation, or gender identity.            Thank you!     Thank you for choosing [facility]  for your care. Our goal is always to provide you with excellent care. Hearing back from our patients is one way we can continue to improve our services. Please take a few minutes to complete the written survey that you may receive in the mail after your visit with us. Thank you!             Your Updated Medication List - Protect others around you: Learn how to safely use, store and throw away your medicines at www.disposemymeds.org.          This list is accurate as of 10/26/18  1:52 PM.  Always use your most recent med list.                   Brand Name Dispense Instructions for use Diagnosis    clotrimazole-betamethasone cream    LOTRISONE    30 g    Apply topically 2 times daily    Dermatitis       norgestim-eth estrad triphasic 0.18/0.215/0.25 MG-25 MCG per tablet    ORTHO TRI-CYCLEN LO    84 tablet    Take 1 tablet by mouth daily    Routine postpartum follow-up       prenatal multivitamin plus iron 27-0.8 MG Tabs per tablet     100 tablet    Take 1 tablet by mouth daily

## 2018-10-26 NOTE — PATIENT INSTRUCTIONS
What Nexplanon Users May Expect    Nexplanon is well tolerated and has a low early-removal rate.    Insertion site complications, such as prolonged pain or infection, are rare. Removal is occasionally difficult, and rarely requires a surgical procedure in the operating room.    Menstrual changes are common with Nexplanon. Bleeding may become more or less frequent or heavy, or absent. The bleeding pattern after the first three months is predictive of future bleeding, but the pattern may change at any time. Average bleeding is 18 days over 3 months. Over 50% of women experience rare over absent bleeding over the two year period, while 25% experience frequent or prolonged bleeding.    In clinical studies, users gained 3.7 pounds over two years. It is unknown what portion of this weight gain is related to Nexplanon    Women with a history of depressed mood may have worsening on Nexplanon, and may need to have the device removed.    Return to baseline ovulation patterns is seen 7-14 days after removal of Nexplanon.    Rarely, headaches and acne have also led to device removal.    Nexplanon may be less effective in women who weight more than 130% of their ideal body weight.    Nexplanon does not protect against HIV or STDs.    Use a back-up method of birth control for the first 7 days after insertion of Nexplanon.    Please call [facility] at [phone] if you have questions or concerns.    For more complete information:  [email or web address]

## 2018-10-26 NOTE — PROGRESS NOTES
NEXPLANON INSERTION PROCEDURE    Nexplanon Lot #: [id number]  NDC#: 6610-1388-38    Echo Mcgovern is a 23 year old  who presents for Nexplanon insertion. The  patient's last menstrual period was prior to her pregnancy. She has not had intercourse since the birth of her son 6 weeks ago. The patient is currently using abstinence  for contraception.           A complete discussion of the risks and benefits of nexplanon use and the details of the insertion procedure was held with the patient.  All questions were answered.  A consent form was signed.      Prior to the beginning of the procedure the team paused to verify the patient's identity, as well as the procedure to be performed and the correct side/site.  All equipment required was ready and available. The patient was positioned appropriately.     Preprocedure medications: 1% plain lidocaine, 1-2 ml    Patients allergies were confirmed.  The patient was placed in the supine position with her right (has tattoo on left arm) arm flexed at the elbow, externally rotated, and placed with her wrist parallel to her ear.  The insertion site was identified 6-8 cm above the elbow crease at the inner aspect overlying the bicepital groove.  The insertion site was marked with a sterile marker. The direction of insertion was also indicated with a loyda 6-8 cm proximal in the bicepital groove.  The insertion area was cleaned with betadine swabs and anesthetized with 2 cc of 1% lidocaine with epinephrine.  The Nexplanon was removed from its blister.  With the shield on, the sky was visible inside the needle tip.  The needle shield was removed.  Counter-traction was applied to the skin at the marked needle insertion site.  The tip of the needle was inserted at the site at a slight angle.  The applicator was then lowered to a horizontal position.  The needle was inserted to its full length, keeping the needle parallel to the surface of the skin and the skin tented. The  applicator button was pressed and the the needle retracted within the device leaving the Nexplanon sky under the skin.  The 4 cm sky was palpated under the skin.  The patient also palpated the sky.  A pressure bandage was applied with sterile gauze. The patient was instructed to remove the bandage in several hours and replace with a band-aid.    The user card was filled out and given to the patient to keep.  The Patient Chart Label was completed and sent for scanning.        ICD-10-CM    1. Nexplanon insertion Z30.017 ETONOGESTREL IMPLANT SYSTEM     etonogestrel (IMPLANON/NEXPLANON) 68 MG IMPL     INSERTION NON-BIODEGRADABLE DRUG DELIVERY IMPLANT           PLAN:   The patient was asked to contact the clinic for any fever/chills/severe pelvic or abdominal pain or heavy bleeding.     FOLLOW-UP:  She was asked to follow up for any problems.   Patient advised to use back-up birth control for 7 days.    LYNNE Strickland, RONNI

## 2018-11-06 NOTE — PROCEDURES
Delivery Summary    Echo Mcgovern MRN# [id number]   Age: 23 year old YOB: 1995     Labor Event Times:    Labor Onset Date       Labor Onset Time    Dilation Complete Date    Dilation Complete Time       Start Pushing Date        Start Pushing Time            Labor Length:    1st Stage (hrs/min) 12.00 35.00   2nd Stage (hrs/min) 0.00 16.00   3rd Stage (hrs/min) 0.00 4.00       Labor Events:     Labor No   Rupture Date     Rupture Time     Rupture Type Spontaneous rupture of membranes occuring during spontaneous labor or augmentation   Fluid Color     Labor Type     Induction    Induction Indication         Augmentation    Labor Complications     Additional Complications     Management of Labor        Antibiotics     IV Antibiotic Given     Additional Management     Fetal Status Prior to  Delivery     Fetal Status Comments         Cervical Ripening:    Date     Time     Type         Delivery:    Episiotomy None   Local Anesthetic        Lacerations None   Sponge Count Correct       Needle Count Correct     Final Count by:    Sutures     Blood loss (ml)    Packing Intentionally Left In     Number     Comments           Information for the patient's :  Roby Mcgovern [[id number]]       Delivery  2018 1:51 PM by  Vaginal, Spontaneous Delivery  Sex:  male Gestational Age: 36w5d  Delivery Clinician:     Living?:            APGARS  One minute Five minutes Ten minutes   Skin color:            Heart rate:            Grimace:            Muscle tone:            Breathing:            Totals: 8  9         Presentation/position:           Resuscitation and Interventions: Method:     Oxygen Type:     Intubation Time:   # of Attempts:     ETT Size:        Tracheal Suction:     Tracheal returns:       Care at Delivery:           Cord information:     Disposition of cord blood:      Blood gases sent?    Complications:     Placenta: Delivered:           appearance.  Comments:  .   "Disposition: Hospital disposal   Measurements:  Weight: 6 lb 3.5 oz (2820 g)  Height: 19\"  Head circumference: 13.3 cm  Chest circumference:     Temperature:     Other providers:       Additional  information:  Forceps:    Verbal Informed Consent Obtained:       Alternative Labor Strategies Discussed:     Emergency Resources Available:       Type:       Accrued Pulling Time:       # of Pulls:      Position:     Fetal Station:       Indications:      Other Indications:     Operative Vaginal Delivery Brief Note Forceps:        Vacuum:    Verbal Informed Consent Obtained:     Alternative Labor Strategies Discussed:     Emergency Resources Available:     Type:      Accrued Pulling Time:       # of Pop-Offs:       # of Pulls:       Position:     Fetal Station:      Indications for Vacuum:       Other Indications:    Operative Vaginal Delivery Brief Note Vacuum:        Shoulder Dystocia Shoulder Dystocia    Fetal Tracing Prior to Delivery:  Category 1   Shoulder dystocia present?:  No                                            Breech:       : Type:     Indications for Primary:     Indications for Secondary:     Other Indications:        Observed anomalies     Output in Delivery Room:                        , no episiotomy, no tear, induction for pre-ecclampsia.    Ministerio Cardoso MD          "

## 2018-11-27 ENCOUNTER — OFFICE VISIT (OUTPATIENT)
Dept: FAMILY MEDICINE | Facility: CLINIC | Age: 23
End: 2018-11-27
Payer: COMMERCIAL

## 2018-11-27 VITALS
TEMPERATURE: 98.3 F | DIASTOLIC BLOOD PRESSURE: 80 MMHG | SYSTOLIC BLOOD PRESSURE: 126 MMHG | RESPIRATION RATE: 16 BRPM | BODY MASS INDEX: 28.99 KG/M2 | WEIGHT: 168.9 LBS | HEART RATE: 80 BPM

## 2018-11-27 DIAGNOSIS — O26.86 PUPPP (PRURITIC URTICARIAL PAPULES AND PLAQUES OF PREGNANCY): Primary | ICD-10-CM

## 2018-11-27 PROBLEM — O09.90 SUPERVISION OF HIGH RISK PREGNANCY, ANTEPARTUM: Status: RESOLVED | Noted: 2018-05-22 | Resolved: 2018-11-27

## 2018-11-27 PROBLEM — O14.10 SEVERE PREECLAMPSIA: Status: RESOLVED | Noted: 2018-09-11 | Resolved: 2018-11-27

## 2018-11-27 PROCEDURE — 99214 OFFICE O/P EST MOD 30 MIN: CPT | Mod: 24 | Performed by: FAMILY MEDICINE

## 2018-11-27 RX ORDER — PREDNISONE 5 MG/1
TABLET ORAL
Qty: 53 TABLET | Refills: 0 | Status: SHIPPED | OUTPATIENT
Start: 2018-11-27 | End: 2018-12-19

## 2018-11-27 ASSESSMENT — ENCOUNTER SYMPTOMS
NAUSEA: 0
GASTROINTESTINAL NEGATIVE: 1
CONSTITUTIONAL NEGATIVE: 1
VOMITING: 0
ABDOMINAL PAIN: 0

## 2018-11-27 NOTE — PROGRESS NOTES
"HPI    SUBJECTIVE:   Echo Mcgovern is a 23 year old female who presents to clinic today for the following health issues:    Rash  Onset: x1 month    Description:   Location: lower abdomen and both thighs  Character: round, raised, red  Itching (Pruritis): YES    Progression of Symptoms:  intermittent    Accompanying Signs & Symptoms:  Fever: no   Body aches or joint pain: YES- both knees  Sore throat symptoms: no   Recent cold symptoms: no     History:   Previous similar rash: YES    Precipitating factors:   Exposure to similar rash: no   New exposures: None   Recent travel: no     Alleviating factors:  Using daughters OTC eczema lotion    Therapies Tried and outcome: Received a cream from her OB/GYN at post partum visit for a rash around her lower abdomen (stretch marks). Works \"if she uses it.\"    Delivered baby 9/12/18.  Did have preeclampsia.  Does also sound like she had PUPPs, although she doesn't recall that name per se. Having itchy spots still on flanks (most stretch marks were more lateral) and on upper medial and upper anterior thighs (distinct patches).  Spots on flanks are persistent, patches on legs are new about one week ago.  Is not nursing.    Review of Systems   Constitutional: Negative.    Gastrointestinal: Negative.  Negative for abdominal pain, nausea and vomiting.   Genitourinary: Negative.    Skin: Positive for rash.         Physical Exam   Constitutional: She is well-developed, well-nourished, and in no distress.   Skin:   Clasic PUPPPs associated with straie on SCAR flanks.  Similar lesions on anterior proximal legs and medial proximal legs SCAR, not associated with striae.   Vitals reviewed.    (O26.86) PUPPP (pruritic urticarial papules and plaques of pregnancy)  (primary encounter diagnosis)  Comment:Supsect this is later post-partum flare.  will do steroid  With taper and will hopfeully keep this under controll  Plan: predniSONE (DELTASONE) 5 MG tablet              RTC in 1m prn    Matthew " MD Claire

## 2018-11-27 NOTE — MR AVS SNAPSHOT
After Visit Summary   11/27/2018    Echo Mcgovern    MRN: [id number]           Patient Information     Date Of Birth          1995        Visit Information        Provider Department      11/27/2018 9:20 AM Matthew Rangel MD [facility]        Today's Diagnoses     PUPPP (pruritic urticarial papules and plaques of pregnancy)    -  1       Follow-ups after your visit        Follow-up notes from your care team     Return in about 1 month (around 12/27/2018).      Who to contact     If you have questions or need follow up information about today's clinic visit or your schedule please contact [facility] directly at [phone].  Normal or non-critical lab and imaging results will be communicated to you by MyChart, letter or phone within 4 business days after the clinic has received the results. If you do not hear from us within 7 days, please contact the clinic through [employer] or phone. If you have a critical or abnormal lab result, we will notify you by phone as soon as possible.  Submit refill requests through [employer]ch[employer] or call your pharmacy and they will forward the refill request to us. Please allow 3 business days for your refill to be completed.          Additional Information About Your Visit        MyChart Information     [employer] gives you secure access to your electronic health record. If you see a primary care provider, you can also send messages to your care team and make appointments. If you have questions, please call your primary care clinic.  If you do not have a primary care provider, please call [phone] and they will assist you.        Care EveryWhere ID     This is your Care EveryWhere ID. This could be used by other organizations to access your [place] medical records  [id number]        Your Vitals Were     Pulse Temperature Respirations Breastfeeding? BMI (Body Mass Index)       80 98.3  F (36.8  C) (Oral) 16 No 28.99 kg/m2         Blood Pressure from Last 3 Encounters:   11/27/18 126/80   10/26/18 118/72   10/22/18 124/80    Weight from Last 3 Encounters:   11/27/18 168 lb 14.4 oz (76.6 kg)   10/26/18 172 lb (78 kg)   10/22/18 172 lb (78 kg)              Today, you had the following     No orders found for display         Today's Medication Changes          These changes are accurate as of 11/27/18 10:00 AM.  If you have any questions, ask your nurse or doctor.               Start taking these medicines.        Dose/Directions    predniSONE 5 MG tablet   Commonly known as:  DELTASONE   Used for:  PUPPP (pruritic urticarial papules and plaques of pregnancy)   Started by:  Matthew Rangel MD        Use 40 mg daily for 5 days then, 30 mg for one day then 20 mg for one day then 10 mg for one day then 5 mg for one day then stop   Quantity:  53 tablet   Refills:  0            Where to get your medicines      These medications were sent to [facility] [address] [address]     Phone:  [phone]     predniSONE 5 MG tablet                Primary Care Provider Fax #    Physician No Ref-Primary [phone]       No address on file        Equal Access to Services     AISSATOU OATES AH: Jess cormiero Sojae, waaxda luqadaha, qaybta kaalmada adeegyada, cyndi owens. So [facility] [phone].    ATENCIÓN: Si habla español, tiene a pickering disposición servicios gratuitos de asistencia lingüística. Llame al [phone].    We comply with applicable federal civil rights laws and [place] laws. We do not discriminate on the basis of race, color, national origin, age, disability, sex, sexual orientation, or gender identity.            Thank you!     Thank you for choosing [facility]  for your care. Our goal is always to provide you with excellent care. Hearing back from our patients is one way we can continue to improve our services.  Please take a few minutes to complete the written survey that you may receive in the mail after your visit with us. Thank you!             Your Updated Medication List - Protect others around you: Learn how to safely use, store and throw away your medicines at www.disposemymeds.org.          This list is accurate as of 11/27/18 10:00 AM.  Always use your most recent med list.                   Brand Name Dispense Instructions for use Diagnosis    clotrimazole-betamethasone cream    LOTRISONE    30 g    Apply topically 2 times daily    Dermatitis       etonogestrel 68 MG Impl    IMPLANON/NEXPLANON     1 each (68 mg) by Subdermal route continuous    Nexplanon insertion       predniSONE 5 MG tablet    DELTASONE    53 tablet    Use 40 mg daily for 5 days then, 30 mg for one day then 20 mg for one day then 10 mg for one day then 5 mg for one day then stop    PUPPP (pruritic urticarial papules and plaques of pregnancy)

## 2018-12-19 ENCOUNTER — OFFICE VISIT (OUTPATIENT)
Dept: FAMILY MEDICINE | Facility: CLINIC | Age: 23
End: 2018-12-19
Payer: COMMERCIAL

## 2018-12-19 VITALS
WEIGHT: 167 LBS | OXYGEN SATURATION: 97 % | TEMPERATURE: 98.3 F | SYSTOLIC BLOOD PRESSURE: 104 MMHG | DIASTOLIC BLOOD PRESSURE: 76 MMHG | BODY MASS INDEX: 28.67 KG/M2 | HEART RATE: 82 BPM

## 2018-12-19 DIAGNOSIS — O26.86 PUPPP (PRURITIC URTICARIAL PAPULES AND PLAQUES OF PREGNANCY): Primary | ICD-10-CM

## 2018-12-19 PROCEDURE — 99213 OFFICE O/P EST LOW 20 MIN: CPT | Performed by: NURSE PRACTITIONER

## 2018-12-19 RX ORDER — FLUOCINONIDE 0.5 MG/G
OINTMENT TOPICAL
Qty: 30 G | Refills: 1 | Status: SHIPPED | OUTPATIENT
Start: 2018-12-19 | End: 2019-12-19

## 2018-12-19 NOTE — PROGRESS NOTES
SUBJECTIVE:   Echo Mcgovern is a 23 year old female who presents to clinic today for the following health issues:      Rash  Onset: x 10 - 11 days ago    Description:   Location: stretch marks on lower abd. Area and all over thighs  Character: round, blotchy, raised, burning, red, flaky and painful  Itching (Pruritis): YES    Progression of Symptoms:  worsening    Accompanying Signs & Symptoms:  Fever: no   Body aches or joint pain: no   Sore throat symptoms: no   Recent cold symptoms: no     History:   Previous similar rash: no     Precipitating factors:   Exposure to similar rash: no   New exposures: None   Recent travel: no     Alleviating factors:  cold compress helps with the itching    Therapies Tried and outcome: no    Itching improved with prednisone, but came back 2 days after completing medications.  Had a steroid cream from OB provider.  This helped, but is out.      Problem list and histories reviewed & adjusted, as indicated.  Additional history: as documented    Current Outpatient Medications   Medication Sig Dispense Refill     etonogestrel (IMPLANON/NEXPLANON) 68 MG IMPL 1 each (68 mg) by Subdermal route continuous  0     clotrimazole-betamethasone (LOTRISONE) cream Apply topically 2 times daily (Patient not taking: Reported on 12/19/2018) 30 g 1     No Known Allergies    Reviewed and updated as needed this visit by clinical staff       Reviewed and updated as needed this visit by Provider         ROS:  Constitutional, HEENT, cardiovascular, pulmonary, gi and gu systems are negative, except as otherwise noted.    OBJECTIVE:     /76 (BP Location: Right arm, Patient Position: Sitting, Cuff Size: Adult Regular)   Pulse 82   Temp 98.3  F (36.8  C) (Oral)   Wt 75.8 kg (167 lb)   SpO2 97%   BMI 28.67 kg/m    Body mass index is 28.67 kg/m .  GENERAL: healthy, alert and no distress  SKIN: overall appears dry; erythematous excoriated papular rash associated with striae on flanks and anterior  thighs bilat    Diagnostic Test Results:  none     ASSESSMENT/PLAN:     1. PUPPP (pruritic urticarial papules and plaques of pregnancy)  Overall skin appears dry; emollients after bathing.  Steroid cream to rash.   - fluocinonide (LIDEX) 0.05 % external ointment; Apply sparingly to affected area twice daily. Do not apply to face.  Dispense: 30 g; Refill: 1    F/u 1 month    LYNNE Fonseca [facility]

## 2019-03-02 ENCOUNTER — OFFICE VISIT (OUTPATIENT)
Dept: URGENT CARE | Facility: URGENT CARE | Age: 24
End: 2019-03-02
Payer: COMMERCIAL

## 2019-03-02 VITALS
TEMPERATURE: 98.6 F | WEIGHT: 164.7 LBS | RESPIRATION RATE: 16 BRPM | OXYGEN SATURATION: 97 % | HEART RATE: 74 BPM | BODY MASS INDEX: 28.27 KG/M2 | DIASTOLIC BLOOD PRESSURE: 84 MMHG | SYSTOLIC BLOOD PRESSURE: 126 MMHG

## 2019-03-02 DIAGNOSIS — M54.5 LOW BACK PAIN, UNSPECIFIED BACK PAIN LATERALITY, UNSPECIFIED CHRONICITY, WITH SCIATICA PRESENCE UNSPECIFIED: Primary | ICD-10-CM

## 2019-03-02 PROCEDURE — 99213 OFFICE O/P EST LOW 20 MIN: CPT | Performed by: FAMILY MEDICINE

## 2019-03-02 RX ORDER — CYCLOBENZAPRINE HCL 10 MG
10 TABLET ORAL 3 TIMES DAILY PRN
Qty: 30 TABLET | Refills: 0 | Status: SHIPPED | OUTPATIENT
Start: 2019-03-02 | End: 2019-04-29

## 2019-03-02 NOTE — PROGRESS NOTES
SUBJECTIVE:   Echo Mcgovern is a 23 year old female who complains of an injury causing low back pain many  months ago. The pain is positional with bending or lifting, with radiation down the legs. Mechanism of injury: She was lifting a heavy load.  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  Since that time she had had exacerbations of the back pain with no known triggers. Symptoms have been sudden since that time. Prior history of back problems: recurrent self limited episodes of low back pain in the past and in addition she has had MRI in the past.  [facility] There is no numbness in the legs.    OBJECTIVE:  Vital signs as noted above. Patient appears to be in mild to moderate pain, antalgic gait noted. Lumbosacral spine area reveals no local tenderness or mass. Painful and reduced LS ROM noted. Straight leg raise is negative at 15 degrees on bilaterally. DTR's, motor strength and sensation normal, including heel and toe gait.  Peripheral pulses are palpable.     She is unable to bend much secondary to spasm and the feeling of pain in the musculature on the left side.  This appears to be by palpation paraspinal not directly down the spine.    .  Examination she was not complaining of pain that went into her buttocks or into her leg        ASSESSMENT:   muscle spasm appear to have left-sided muscle spasm paraspinal.  With at times sciatic type of symptoms.    Her past medical history is significant for the back pain and injury with [facility] that was read as negative he does not appear that she has had any physical therapy or continuing follow-up for her back problems.    Her exam did not show any evidence that suggested upper motor neuron abnormality she is having no fasciculations her reflexes are normal bilaterally.        PLAN:  For acute pain, rest,     We will use a muscle relaxant because I do think that this will take some of the spasm out of  the muscle allowing her to bend and take some pressure off the paraspinal nerves.    I would also use a nonsteroidal medication.    Encouraged her to return to a primary care for further reevaluation examination.  I do think she would benefit from physical therapy and on reexamination because this is been a long-term problem she may    Benefit from re-evaluation of the injury with MRI

## 2019-04-29 ENCOUNTER — OFFICE VISIT (OUTPATIENT)
Dept: FAMILY MEDICINE | Facility: CLINIC | Age: 24
End: 2019-04-29
Payer: COMMERCIAL

## 2019-04-29 VITALS
WEIGHT: 163 LBS | DIASTOLIC BLOOD PRESSURE: 72 MMHG | OXYGEN SATURATION: 98 % | TEMPERATURE: 98.5 F | HEART RATE: 97 BPM | BODY MASS INDEX: 27.83 KG/M2 | RESPIRATION RATE: 16 BRPM | HEIGHT: 64 IN | SYSTOLIC BLOOD PRESSURE: 114 MMHG

## 2019-04-29 DIAGNOSIS — Z00.00 ROUTINE GENERAL MEDICAL EXAMINATION AT A HEALTH CARE FACILITY: Primary | ICD-10-CM

## 2019-04-29 DIAGNOSIS — Z23 NEED FOR HPV VACCINE: ICD-10-CM

## 2019-04-29 DIAGNOSIS — Z01.84 IMMUNITY STATUS TESTING: ICD-10-CM

## 2019-04-29 DIAGNOSIS — Z11.1 VISIT FOR MANTOUX TEST: ICD-10-CM

## 2019-04-29 PROCEDURE — 86765 RUBEOLA ANTIBODY: CPT | Performed by: NURSE PRACTITIONER

## 2019-04-29 PROCEDURE — 86787 VARICELLA-ZOSTER ANTIBODY: CPT | Performed by: NURSE PRACTITIONER

## 2019-04-29 PROCEDURE — 86762 RUBELLA ANTIBODY: CPT | Performed by: NURSE PRACTITIONER

## 2019-04-29 PROCEDURE — 99395 PREV VISIT EST AGE 18-39: CPT | Performed by: NURSE PRACTITIONER

## 2019-04-29 PROCEDURE — 36415 COLL VENOUS BLD VENIPUNCTURE: CPT | Performed by: NURSE PRACTITIONER

## 2019-04-29 PROCEDURE — 86735 MUMPS ANTIBODY: CPT | Performed by: NURSE PRACTITIONER

## 2019-04-29 PROCEDURE — 86580 TB INTRADERMAL TEST: CPT | Performed by: NURSE PRACTITIONER

## 2019-04-29 ASSESSMENT — ENCOUNTER SYMPTOMS
COUGH: 0
HEMATURIA: 0
DIARRHEA: 0
DIZZINESS: 0
NERVOUS/ANXIOUS: 0
CHILLS: 0
ABDOMINAL PAIN: 0
EYE PAIN: 0
CONSTIPATION: 0
HEMATOCHEZIA: 0

## 2019-04-29 ASSESSMENT — MIFFLIN-ST. JEOR: SCORE: 1469.36

## 2019-04-29 NOTE — PROGRESS NOTES
SUBJECTIVE:   CC: Echo Mcgovern is an 23 year old woman who presents for preventive health visit.     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  Has paperwork that needs to be completed today.       Today's PHQ-2 Score:   PHQ-2 ( 1999 Pfizer) 4/29/2019   Q1: Little interest or pleasure in doing things 0   Q2: Feeling down, depressed or hopeless 0   PHQ-2 Score 0   Q1: Little interest or pleasure in doing things Not at all   Q2: Feeling down, depressed or hopeless Not at all   PHQ-2 Score 0       Abuse: Current or Past(Physical, Sexual or Emotional)- No  Do you feel safe in your environment? Yes    Social History     Tobacco Use     Smoking status: Never Smoker     Smokeless tobacco: Never Used   Substance Use Topics     Alcohol use: No         Alcohol Use 4/29/2019   Prescreen: >3 drinks/day or >7 drinks/week? No       Reviewed orders with patient.  Reviewed health maintenance and updated orders accordingly - Yes  BP Readings from Last 3 Encounters:   04/29/19 114/72   03/02/19 126/84   12/19/18 104/76    Wt Readings from Last 3 Encounters:   04/29/19 73.9 kg (163 lb)   03/02/19 74.7 kg (164 lb 11.2 oz)   12/19/18 75.8 kg (167 lb)                  Current Outpatient Medications   Medication Sig Dispense Refill     etonogestrel (IMPLANON/NEXPLANON) 68 MG IMPL 1 each (68 mg) by Subdermal route continuous  0     clotrimazole-betamethasone (LOTRISONE) cream Apply topically 2 times daily (Patient not taking: Reported on 4/29/2019) 30  "g 1     fluocinonide (LIDEX) 0.05 % external ointment Apply sparingly to affected area twice daily. Do not apply to face. (Patient not taking: Reported on 3/2/2019) 30 g 1     nabumetone (RELAFEN) 750 MG tablet Take 1 tablet (750 mg) by mouth 2 times daily (Patient not taking: Reported on 4/29/2019) 30 tablet 0     No Known Allergies    Mammogram not appropriate for this patient based on age.    Pertinent mammograms are reviewed under the imaging tab.  History of abnormal Pap smear: NO - age 21-29 PAP every 3 years recommended  PAP / HPV 10/22/2018   PAP NIL     Reviewed and updated as needed this visit by clinical staff  Tobacco  Allergies  Meds  Med Hx  Surg Hx  Fam Hx  Soc Hx        Reviewed and updated as needed this visit by Provider        Past Medical History:   Diagnosis Date     Abnormal Pap smear of cervix 02/17/2016    see problem list     H/O pre-eclampsia in prior pregnancy, currently pregnant       Past Surgical History:   Procedure Laterality Date     HAND SURGERY  05/2015    right hand- fractured hand, had 2 screws placed     KNEE SURGERY  07/2011    Torn ACL, Left knee       Review of Systems   Constitutional: Negative for chills.   HENT: Negative for congestion and ear pain.    Eyes: Negative for pain.   Respiratory: Negative for cough.    Cardiovascular: Negative for chest pain.   Gastrointestinal: Negative for abdominal pain, constipation, diarrhea and hematochezia.   Genitourinary: Negative for hematuria.   Neurological: Negative for dizziness.   Psychiatric/Behavioral: The patient is not nervous/anxious.           OBJECTIVE:   /72 (BP Location: Right arm, Patient Position: Sitting, Cuff Size: Adult Regular)   Pulse 97   Temp 98.5  F (36.9  C) (Oral)   Resp 16   Ht 1.626 m (5' 4\")   Wt 73.9 kg (163 lb)   SpO2 98%   BMI 27.98 kg/m    Physical Exam  GENERAL: healthy, alert and no distress  EYES: Eyes grossly normal to inspection, PERRL and conjunctivae and sclerae normal  HENT: " "ear canals and TM's normal, nose and mouth without ulcers or lesions  NECK: no adenopathy, no asymmetry, masses, or scars and thyroid normal to palpation  RESP: lungs clear to auscultation - no rales, rhonchi or wheezes  CV: regular rate and rhythm, normal S1 S2, no S3 or S4, no murmur, click or rub, no peripheral edema and peripheral pulses strong  ABDOMEN: soft, nontender, no hepatosplenomegaly, no masses and bowel sounds normal  MS: no gross musculoskeletal defects noted, no edema  SKIN: no suspicious lesions or rashes  NEURO: Normal strength and tone, mentation intact and speech normal  PSYCH: mentation appears normal, affect normal/bright    Diagnostic Test Results:  none     ASSESSMENT/PLAN:   1. Need for HPV vaccine  She believes this was done at her Peds office in [place].  She called for records, but didn't sound like they would release these to her.  Signed JOSEPH for vaccine record today.      2. Routine general medical examination at a health care facility  Normal exam.  Form signed for school.      3. Immunity status testing  Needs for college program.   - Rubeola Antibody IgG  - Mumps Antibody IgG  - Rubella Antibody IgG Quantitative  - Varicella Zoster Virus Antibody IgG    4. Visit for Mantoux test  Needs for college program.   - TB INTRADERMAL TEST    COUNSELING:  Reviewed preventive health counseling, as reflected in patient instructions       Regular exercise       Healthy diet/nutrition    BP Readings from Last 1 Encounters:   04/29/19 114/72     Estimated body mass index is 27.98 kg/m  as calculated from the following:    Height as of this encounter: 1.626 m (5' 4\").    Weight as of this encounter: 73.9 kg (163 lb).      Weight management plan: Discussed healthy diet and exercise guidelines     reports that she has never smoked. She has never used smokeless tobacco.      Counseling Resources:  ATP IV Guidelines  Pooled Cohorts Equation Calculator  Breast Cancer Risk Calculator  FRAX Risk " Assessment  ICSI Preventive Guidelines  Dietary Guidelines for Americans, 2010  USDA's MyPlate  ASA Prophylaxis  Lung CA Screening    Minerva Cartwright, LYNNE LIVINGSTON  [facility]

## 2019-04-29 NOTE — PROGRESS NOTES
"   SUBJECTIVE:   CC: Echo Mcgovern is an 23 year old woman who presents for preventive health visit.     Healthy Habits:    Do you get at least three servings of calcium containing foods daily (dairy, green leafy vegetables, etc.)? { :242263::\"yes\"}    Amount of exercise or daily activities, outside of work: { :610634}    Problems taking medications regularly { :286492::\"No\"}    Medication side effects: { :189567::\"No\"}    Have you had an eye exam in the past two years? { :095753}    Do you see a dentist twice per year? { :685846}    Do you have sleep apnea, excessive snoring or daytime drowsiness?{ :372724}  {Outside tests to abstract? :238708}    {additional problems to add (Optional):937716}    Today's PHQ-2 Score:   PHQ-2 ( 1999 Pfizer) 4/29/2019 11/27/2018   Q1: Little interest or pleasure in doing things 0 0   Q2: Feeling down, depressed or hopeless 0 0   PHQ-2 Score 0 0   Q1: Little interest or pleasure in doing things Not at all -   Q2: Feeling down, depressed or hopeless Not at all -   PHQ-2 Score 0 -     {PHQ-2 LOOK IN ASSESSMENTS (Optional) :850949}  Abuse: Current or Past(Physical, Sexual or Emotional)- {YES/NO/NA:993356}  Do you feel safe in your environment? {YES/NO/NA:198118}    Social History     Tobacco Use     Smoking status: Never Smoker     Smokeless tobacco: Never Used   Substance Use Topics     Alcohol use: No     If you drink alcohol do you typically have >3 drinks per day or >7 drinks per week? {ETOH :439609}                     Reviewed orders with patient.  Reviewed health maintenance and updated orders accordingly - {Yes/No:740409::\"Yes\"}  {Chronicprobdata (Optional):486081}    {Mammo Decision Support (Optional):653022}    Pertinent mammograms are reviewed under the imaging tab.  History of abnormal Pap smear: {PAP HX:711530}  PAP / HPV 10/22/2018   PAP NIL     Reviewed and updated as needed this visit by clinical staff         Reviewed and updated as needed this visit by Provider      " "  {HISTORY OPTIONS (Optional):526525}    ROS:  { :044390}    OBJECTIVE:   There were no vitals taken for this visit.  EXAM:  {Exam Choices:742609}    {Diagnostic Test Results (Optional):740631::\"Diagnostic Test Results:\",\"none \"}    ASSESSMENT/PLAN:   {Diag Picklist:877017}    COUNSELING:   {FEMALE COUNSELING MESSAGES:861454::\"Reviewed preventive health counseling, as reflected in patient instructions\"}    BP Readings from Last 1 Encounters:   03/02/19 126/84     Estimated body mass index is 28.27 kg/m  as calculated from the following:    Height as of 10/26/18: 1.626 m (5' 4\").    Weight as of 3/2/19: 74.7 kg (164 lb 11.2 oz).    {BP Counseling- Complete if BP >= 120/80  (Optional):447154}  {Weight Management Plan (ACO) Complete if BMI is abnormal-  Ages 18-64  BMI >24.9.  Age 65+ with BMI <23 or >30 (Optional):849952}     reports that she has never smoked. She has never used smokeless tobacco.  {Tobacco Cessation -- Complete if patient is a smoker (Optional):680389}    Counseling Resources:  ATP IV Guidelines  Pooled Cohorts Equation Calculator  Breast Cancer Risk Calculator  FRAX Risk Assessment  ICSI Preventive Guidelines  Dietary Guidelines for Americans, 2010  USDA's MyPlate  ASA Prophylaxis  Lung CA Screening    LYNNE Fonseca [facility]  "

## 2019-04-30 LAB
MEV IGG SER QL IA: 0.7 AI (ref 0–0.8)
MUV IGG SER QL IA: 1 AI (ref 0–0.8)
RUBV IGG SERPL IA-ACNC: 21 IU/ML
VZV IGG SER QL IA: 2 AI (ref 0–0.8)

## 2019-05-02 ENCOUNTER — ALLIED HEALTH/NURSE VISIT (OUTPATIENT)
Dept: NURSING | Facility: CLINIC | Age: 24
End: 2019-05-02
Payer: COMMERCIAL

## 2019-05-02 DIAGNOSIS — Z11.1 VISIT FOR MANTOUX TEST: ICD-10-CM

## 2019-05-02 DIAGNOSIS — Z23 NEED FOR MMR VACCINE: Primary | ICD-10-CM

## 2019-05-02 LAB
PPDINDURATION: 0 MM (ref 0–5)
PPDREDNESS: 0 MM

## 2019-05-02 PROCEDURE — 90707 MMR VACCINE SC: CPT

## 2019-05-02 PROCEDURE — 90471 IMMUNIZATION ADMIN: CPT

## 2019-05-02 PROCEDURE — 99207 ZZC NO CHARGE NURSE ONLY: CPT

## 2019-05-02 NOTE — PROGRESS NOTES
Mantoux result:  Lab Results   Component Value Date    PPDREDNESS 0 05/02/2019    PPDINDURATIO 0 05/02/2019     Is induration greater than 5mm?  No     Mantoux results:     No induration.  No swelling.  No redness.    Screening Questionnaire for Adult Immunization    Are you sick today?   No   Do you have allergies to medications, food, a vaccine component or latex?   No   Have you ever had a serious reaction after receiving a vaccination?   No   Do you have a long-term health problem with heart disease, lung disease, asthma, kidney disease, metabolic disease (e.g. diabetes), anemia, or other blood disorder?   No   Do you have cancer, leukemia, HIV/AIDS, or any other immune system problem?   No   In the past 3 months, have you taken medications that affect  your immune system, such as prednisone, other steroids, or anticancer drugs; drugs for the treatment of rheumatoid arthritis, Crohn s disease, or psoriasis; or have you had radiation treatments?   No   Have you had a seizure, or a brain or other nervous system problem?   No   During the past year, have you received a transfusion of blood or blood     products, or been given immune (gamma) globulin or antiviral drug?   No   For women: Are you pregnant or is there a chance you could become        pregnant during the next month?   No   Have you received any vaccinations in the past 4 weeks?   No     Immunization questionnaire answers were all negative.        Per orders of Minerva Cartwright, injection of MMR given by Emmy Quintero. Patient instructed to remain in clinic for 15 minutes afterwards, and to report any adverse reaction to me immediately.       Screening performed by Emmy Quinteor on 5/2/2019 at 9:21 AM.    Emmy Quintero RN

## 2019-05-09 ENCOUNTER — ALLIED HEALTH/NURSE VISIT (OUTPATIENT)
Dept: NURSING | Facility: CLINIC | Age: 24
End: 2019-05-09
Payer: COMMERCIAL

## 2019-05-09 DIAGNOSIS — Z11.1 SCREENING EXAMINATION FOR PULMONARY TUBERCULOSIS: Primary | ICD-10-CM

## 2019-05-09 PROCEDURE — 99207 ZZC NO CHARGE NURSE ONLY: CPT

## 2019-05-09 PROCEDURE — 86580 TB INTRADERMAL TEST: CPT

## 2019-05-09 NOTE — PROGRESS NOTES
2nd Mantoux    The patient is asked the following questions today and these are her answers:    -Have you had a mantoux administered in the past 30 days?    Yes  -Have you had a previous positive Mantoux.  No  -Have you received BCG in the past.  No  -Have you had a live vaccine  (MMR, Varicella, OPV, Yellow Fever) in the last 6 weeks.  No  -Have you had and active  viral or bacterial infection in the past 6 weeks.  No  -Have you received corticosteroids or immunosuppressive agents in the past 6 weeks.  No  -Have you been diagnosed with HIV?  No  -Do you have a maglinancy?  No

## 2019-05-13 ENCOUNTER — ALLIED HEALTH/NURSE VISIT (OUTPATIENT)
Dept: NURSING | Facility: CLINIC | Age: 24
End: 2019-05-13
Payer: COMMERCIAL

## 2019-05-13 DIAGNOSIS — Z11.1 SCREENING EXAMINATION FOR PULMONARY TUBERCULOSIS: Primary | ICD-10-CM

## 2019-05-13 PROCEDURE — 86580 TB INTRADERMAL TEST: CPT

## 2019-05-13 PROCEDURE — 99207 ZZC NO CHARGE NURSE ONLY: CPT

## 2019-05-13 NOTE — PROGRESS NOTES

## 2019-05-15 ENCOUNTER — ALLIED HEALTH/NURSE VISIT (OUTPATIENT)
Dept: NURSING | Facility: CLINIC | Age: 24
End: 2019-05-15
Payer: COMMERCIAL

## 2019-05-15 DIAGNOSIS — Z11.1 VISIT FOR MANTOUX TEST: Primary | ICD-10-CM

## 2019-05-15 LAB
PPDINDURATION: 0 MM (ref 0–5)
PPDREDNESS: 0 MM

## 2019-05-15 PROCEDURE — 99207 ZZC NO CHARGE NURSE ONLY: CPT

## 2019-05-15 NOTE — PROGRESS NOTES
Mantoux result:  Lab Results   Component Value Date    PPDREDNESS 0 05/15/2019    PPDINDURATIO 0 05/15/2019     Is induration greater than 5mm?  No     Mantoux results:     No induration.  No swelling.  No redness.    Emmy Quintero RN

## 2019-06-10 ENCOUNTER — OFFICE VISIT (OUTPATIENT)
Dept: URGENT CARE | Facility: URGENT CARE | Age: 24
End: 2019-06-10
Payer: COMMERCIAL

## 2019-06-10 ENCOUNTER — ANCILLARY PROCEDURE (OUTPATIENT)
Dept: GENERAL RADIOLOGY | Facility: CLINIC | Age: 24
End: 2019-06-10
Attending: FAMILY MEDICINE
Payer: COMMERCIAL

## 2019-06-10 VITALS
SYSTOLIC BLOOD PRESSURE: 114 MMHG | HEIGHT: 64 IN | TEMPERATURE: 97.9 F | WEIGHT: 160 LBS | DIASTOLIC BLOOD PRESSURE: 66 MMHG | BODY MASS INDEX: 27.31 KG/M2 | RESPIRATION RATE: 12 BRPM | OXYGEN SATURATION: 100 % | HEART RATE: 76 BPM

## 2019-06-10 DIAGNOSIS — M77.8 TENDINITIS OF FINGER OF RIGHT HAND: ICD-10-CM

## 2019-06-10 DIAGNOSIS — R20.2 RIGHT HAND PARESTHESIA: ICD-10-CM

## 2019-06-10 DIAGNOSIS — R20.2 RIGHT HAND PARESTHESIA: Primary | ICD-10-CM

## 2019-06-10 PROCEDURE — 99214 OFFICE O/P EST MOD 30 MIN: CPT | Performed by: FAMILY MEDICINE

## 2019-06-10 PROCEDURE — 73130 X-RAY EXAM OF HAND: CPT | Mod: RT

## 2019-06-10 ASSESSMENT — PAIN SCALES - GENERAL: PAINLEVEL: WORST PAIN (10)

## 2019-06-10 ASSESSMENT — MIFFLIN-ST. JEOR: SCORE: 1455.76

## 2019-06-10 NOTE — PATIENT INSTRUCTIONS
Patient Education     Tendonitis  A tendon is the thick fibrous cord that joins muscle to bone and allows joints to move. When a tendon becomes inflamed, it is called tendonitis. This can occur from overuse, injury, or infection. This usually involves the shoulders, forearm, wrist, hands and feet. Symptoms include pain, swelling and tenderness to the touch. Moving the joint increases the pain.  It takes 4 to 6 weeks or more for tendonitis to heal. It is treated by preventing motion of the tendon, occasionally with a splint or brace, and the use of anti-inflammatory medicine.  Home care    Some people find relief with ice packs. These can be crushed or cubed ice in a plastic bag or a bag of frozen vegetables wrapped in a thin towel. Other people get better relief with heat. This can include a hot shower, hot bath, or a moist towel warmed in a microwave. Try each and use the method that feels best, for 15 to 20 minutes several times a day.    Rest the inflamed joint and protect it from movement.    You may use over-the-counter ibuprofen or naproxen to treat pain and inflammation, unless another medicine was prescribed. If you can't take these medicines, acetaminophen may help with the pain, but does not treat inflammation. If you have chronic liver or kidney disease or ever had a stomach ulcer or gastrointestinal bleeding, talk with your doctor before using these medicines.    As your symptoms improve, begin gradual motion at the involved joint.  Follow-up care  Follow up with your healthcare provider if you are not improving after 5 to 7 days of treatment.  When to seek medical advice  Call your healthcare provider right away if any of these occur:    Redness over the painful area    Increasing pain or swelling at the joint    Fever lasting 24 to 48 hours or chills, or as advised by your healthcare provider  Date Last Reviewed: 5/1/2018 2000-2018 [employer]. [address] [facility] [facility], [place]  [address]. All rights reserved. This information is not intended as a substitute for professional medical care. Always follow your healthcare professional's instructions.

## 2019-06-10 NOTE — LETTER
[facility]RGENT [facility]  [address]  [facility] [address]  [phone]      Margaret 10, 2019    RE:  Echo Mcgovern                                                                                                                                                       [id number] [facility] [address]            To whom it may concern:    Echo Mcgovern is under my professional care for    Right hand paresthesia  Tendinitis of finger of right hand.   She  may return to work with the following: Light duty- unable to use vibratory hand  equipment more than 30 minutes  per shift and use wrist brace on the right hand during work  For 1 month .          Sincerely,        Brooklyn Beltran MD    [facility]

## 2019-06-11 NOTE — PROGRESS NOTES
SUBJECTIVE:  Chief Complaint   Patient presents with     Urgent Care     Hand Pain     Previous fracture/surgery in right hand, has some loss of sensation in the hand from surgery.. Swelling, unable to , pain when squeezing only x 1d     Echo Mcgovern is a 23 year old female who noted weakness and pain in the ulnar side of the the right hand today while working.  She did not hit the hand.  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  Causing a vibrating and holding her right hand/ wrist cocked back.  She started having symptoms after about 30 minutes     She sustained a right hand injury 2 years ago. Mechanism of injury: fall from an aircraft onto her hand -  She was eventually diagnosed with fracture of the hamate with screw fixation placed to hold contact between the fracture fragments.  So she has a 2 inch scar over 5th metacarpal and still has 2 screws in the hamate.    Past Medical History:   Diagnosis Date     Abnormal Pap smear of cervix 02/17/2016    see problem list     H/O pre-eclampsia in prior pregnancy, currently pregnant      Patient Active Problem List   Diagnosis     Indication for care in labor or delivery     Elevated blood pressure reading without diagnosis of hypertension     ASCUS of cervix with negative high risk HPV        ALLERGIES:  Patient has no known allergies.        Current Outpatient Medications on File Prior to Visit:  etonogestrel (IMPLANON/NEXPLANON) 68 MG IMPL 1 each (68 mg) by Subdermal route continuous   clotrimazole-betamethasone (LOTRISONE) cream Apply topically 2 times daily (Patient not taking: Reported on 4/29/2019)   fluocinonide (LIDEX) 0.05 % external ointment Apply sparingly to affected area twice daily. Do not apply to face. (Patient not taking: Reported on 3/2/2019)   nabumetone (RELAFEN) 750 MG tablet Take 1 tablet (750 mg) by mouth 2 times daily (Patient not taking: Reported on 4/29/2019)     No current facility-administered medications on file  "prior to visit.     Social History     Tobacco Use     Smoking status: Never Smoker     Smokeless tobacco: Never Used   Substance Use Topics     Alcohol use: No       Family History   Problem Relation Age of Onset     Family History Negative Mother          ROS:  CONSTITUTIONAL:NEGATIVE for fever, chills, change in weight  INTEGUMENTARY/SKIN: NEGATIVE for worrisome rashes,  or lesions  EYES: NEGATIVE for vision changes or irritation  ENT/MOUTH: NEGATIVE for ear, mouth and throat problems  RESP:NEGATIVE for significant cough or SOB    OBJECTIVE:  Vital signs: /66 (BP Location: Right arm, Patient Position: Sitting, Cuff Size: Adult Regular)   Pulse 76   Temp 97.9  F (36.6  C) (Oral)   Resp 12   Ht 1.626 m (5' 4\")   Wt 72.6 kg (160 lb)   LMP  (LMP Unknown)   SpO2 100%   Breastfeeding? No   BMI 27.46 kg/m    Appearance: alert and cooperative. Mild distress  No pain, contusion, swelling or limited ROM of the right elbow, shoulder, wrist    right Hand exam:  sensation intact all fingers  ROM intact all fingers, though pain in the region of the  4th/ 5th metacarpals with resisted extension  of the 4th and 5th finger(s)  No pain with flexion 4th and 5th fingers  No pain with movement or resistance of fingers 1-3  Capillary refill 2 seconds all fingers  Swelling and contusion - none  No fresh Injury to the skin of the hand  -right dorsal hand has 2 inch well heeled scar region 5th metacarpal,     Some pain with resisted dorsiflexion of the wrist     X-ray:  right hand  no fracture or dislocation noted-  2 screws in the hamate-  Appear well healed.  pending review by Radiologist.   x-ray read by me Brooklyn Beltran MD    ASSESSMENT:  Right hand paresthesia     - XR Hand Right G/E 3 Views; Future  - order for DME; Right wrist splint    Discussed that vibration and hyperextension of the wrist/ hand likely caused scar tissue stretching/ abrasion on the areas of her past surgery and screw implants-  Causing pain and " tingling of the nerves in her ulnar side hand.  -  She should limit this position and using vibratory machines.      Given wrist splint to  Limit hyperextension of the wrist      Tendinitis of finger of right hand     - XR Hand Right G/E 3 Views; Future  - order for DME; Right wrist splint     Her greatest pain is with extending 4th and 5th fingers against resistance-  Consistent with tendonitis.    She declines steroid medication for treatment  Reassured no fracture,  No complications at site of hamate with screws   cool packs to reduce swelling , then later warm packs to the painful region to encourage blood flow     Ibuprofen/ acetaminophen as and alternative for pain  Splint provided  To limit extreme movements  Follow-up with primary care or ortho hand if persistent symptoms     Note for work with activity restrictions.

## 2019-07-01 ENCOUNTER — OFFICE VISIT (OUTPATIENT)
Dept: URGENT CARE | Facility: URGENT CARE | Age: 24
End: 2019-07-01
Payer: COMMERCIAL

## 2019-07-01 VITALS
HEIGHT: 64 IN | DIASTOLIC BLOOD PRESSURE: 66 MMHG | TEMPERATURE: 98.4 F | OXYGEN SATURATION: 98 % | WEIGHT: 160 LBS | BODY MASS INDEX: 27.31 KG/M2 | HEART RATE: 82 BPM | RESPIRATION RATE: 16 BRPM | SYSTOLIC BLOOD PRESSURE: 114 MMHG

## 2019-07-01 DIAGNOSIS — L03.011 PARONYCHIA OF FINGER OF RIGHT HAND: Primary | ICD-10-CM

## 2019-07-01 PROCEDURE — 99213 OFFICE O/P EST LOW 20 MIN: CPT | Performed by: FAMILY MEDICINE

## 2019-07-01 RX ORDER — CEPHALEXIN 500 MG/1
500 CAPSULE ORAL 3 TIMES DAILY
Qty: 21 CAPSULE | Refills: 0 | Status: SHIPPED | OUTPATIENT
Start: 2019-07-01 | End: 2019-08-08

## 2019-07-01 ASSESSMENT — MIFFLIN-ST. JEOR: SCORE: 1455.76

## 2019-07-02 NOTE — PATIENT INSTRUCTIONS
Cephalexin 3 times a day for 7 days    Warm soaks in water 2 times a day for 10 minutes each time    This may expand and want to pop, if so then soak in warm water and gently apply pressure towards the end of the finger to help this drain    If symptoms have not improved in the next 5-7 days return to be seen    If you have concerns, return and we can re-evaluate

## 2019-07-02 NOTE — PROGRESS NOTES
"Subjective:   Echo Mcgovern is a 23 year old female who presents for   Chief Complaint   Patient presents with     Finger     right middel finger issue, swelling, much pain x today, not sure what happened     Symptoms started today. No fever. Without drainage.   Meds taken: none    Accompanied by her son, Roby      Patient Active Problem List    Diagnosis Date Noted     Elevated blood pressure reading without diagnosis of hypertension 09/12/2018     Priority: Medium     Indication for care in labor or delivery 09/11/2018     Priority: Medium     ASCUS of cervix with negative high risk HPV 02/17/2016     Priority: Medium     2/17/16 ASCUS pap, neg HR HPV @ age 20 (scanned record). Routine screening per ASCCP guidelines  10/22/18 NIL pap           Current Outpatient Medications   Medication     cephALEXin (KEFLEX) 500 MG capsule     clotrimazole-betamethasone (LOTRISONE) cream     etonogestrel (IMPLANON/NEXPLANON) 68 MG IMPL     fluocinonide (LIDEX) 0.05 % external ointment     nabumetone (RELAFEN) 750 MG tablet     order for DME     No current facility-administered medications for this visit.        ROS:  As above per HPI    Objective:   /66 (BP Location: Right arm, Patient Position: Chair, Cuff Size: Adult Regular)   Pulse 82   Temp 98.4  F (36.9  C) (Oral)   Resp 16   Ht 1.626 m (5' 4\")   Wt 72.6 kg (160 lb)   LMP  (LMP Unknown)   SpO2 98%   BMI 27.46 kg/m  , Body mass index is 27.46 kg/m .  Gen:  NAD, well-nourished, sitting in chair comfortably  HEENT: EOMI, sclera anicteric, Head normocephalic, ; nares patent; mosit mucous membranes  Neck: trachea midline, no thyromegaly  CV:  Hemodynamically stable, cap refill < 2 seconds of affected finger  Pulm:  no increased work of breathing , CTAB, no wheezes/rales/rhonchi   Extrem: no cyanosis, edema or clubbing  Skin: dorsum of middle finger at base of fingernail there is inflamed tissue. No open wounds or discharge.   Psych: Euthymic, linear thoughts, " normal rate of speech            Assessment & Plan:   Echo Mcgovern, 23 year old female who presents with:  Paronychia of finger of right hand, middle  Early development of paronychia. Recommended warm soaks 2-3x a day and starting oral antibiotic. Discussed as pus develops this will become fluctuant and rupture at which point she can gently massage the fluid out in warm water. DId not feel she was a good candidate for I and D at this time as this is early developing. Normal vital signs.   - cephALEXin (KEFLEX) 500 MG capsule  Dispense: 21 capsule; Refill: 0      Blake Patel MD   [facility]    The use of Dragon/AudioBoo dictation services may have been used to construct the content in this note; any grammatical or spelling errors are non-intentional. Please contact the author of this note directly if you are in need of any clarification.

## 2019-08-08 ENCOUNTER — OFFICE VISIT (OUTPATIENT)
Dept: OBGYN | Facility: CLINIC | Age: 24
End: 2019-08-08
Payer: COMMERCIAL

## 2019-08-08 VITALS
DIASTOLIC BLOOD PRESSURE: 76 MMHG | BODY MASS INDEX: 27.31 KG/M2 | WEIGHT: 160 LBS | SYSTOLIC BLOOD PRESSURE: 124 MMHG | HEIGHT: 64 IN

## 2019-08-08 DIAGNOSIS — N64.52 BLOODY DISCHARGE FROM RIGHT NIPPLE: Primary | ICD-10-CM

## 2019-08-08 PROCEDURE — 99214 OFFICE O/P EST MOD 30 MIN: CPT | Performed by: OBSTETRICS & GYNECOLOGY

## 2019-08-08 ASSESSMENT — MIFFLIN-ST. JEOR: SCORE: 1450.76

## 2019-08-08 NOTE — PATIENT INSTRUCTIONS
You can reach your [facility] Care Team any time of the day by calling [phone]. This number will put you in touch with the 24 hour nurse line if the clinic is closed.    To contact your OB/GYN Surgery Scheduler please call [phone]. This is a direct number for your care team between 8 a.m. and 4 p.m. Monday through Friday.    [facility] is open for your convenience: [phone]  Monday through Friday 8 a.m. to 8:30 p.m.  Saturday 9 a.m. to 6 p.m.  Sunday Noon to 6 p.m.    They are closed on all major holidays.

## 2019-08-08 NOTE — NURSING NOTE
"Chief Complaint   Patient presents with     Breast Problem     Bloody discharge after showering x 3 days. Denies injury to R breast, pain, redness or discharge when not showering.       Initial /76   Ht 1.626 m (5' 4\")   Wt 72.6 kg (160 lb)   LMP  (LMP Unknown)   Breastfeeding? No   BMI 27.46 kg/m   Estimated body mass index is 27.46 kg/m  as calculated from the following:    Height as of this encounter: 1.626 m (5' 4\").    Weight as of this encounter: 72.6 kg (160 lb).  BP completed using cuff size: regular    Questioned patient about current smoking habits.  Pt. has never smoked.          The following HM Due: NONE      The following patient reported/Care Every where data was sent to:  P ABSTRACT QUALITY INITIATIVES [[id number]]  Dorys Damon LPN             "

## 2019-08-11 NOTE — PROGRESS NOTES
HPI:  Echo Mcgovern is a 24 year old female  No LMP recorded (lmp unknown). Patient has had an implant.  Nexplanon for contraception, who presents for evaluation of bloody discharge from her right nipple that she first noticed 3 days ago while taking a shower.  No known precipitating event.  The patient stopped breast-feeding 9 months ago.  She denies any skin changes masses axillary adenopathy or related medication use.  She denies pain or discomfort or trauma to the area.    Past Medical History:   Diagnosis Date     Abnormal Pap smear of cervix 2016    see problem list     H/O pre-eclampsia in prior pregnancy, currently pregnant      Past Surgical History:   Procedure Laterality Date     HAND SURGERY  2015    right hand- fractured hand, had 2 screws placed     KNEE SURGERY  2011    Torn ACL, Left knee     Family History   Problem Relation Age of Onset     Family History Negative Mother      Social History     Socioeconomic History     Marital status:      Spouse name: Ministerio     Number of children: 1     Years of education: Not on file     Highest education level: Not on file   Occupational History     Occupation: retail   Social Needs     Financial resource strain: Not on file     Food insecurity:     Worry: Not on file     Inability: Not on file     Transportation needs:     Medical: Not on file     Non-medical: Not on file   Tobacco Use     Smoking status: Never Smoker     Smokeless tobacco: Never Used   Substance and Sexual Activity     Alcohol use: No     Drug use: No     Sexual activity: Yes     Partners: Male     Birth control/protection: Pill   Lifestyle     Physical activity:     Days per week: Not on file     Minutes per session: Not on file     Stress: Not on file   Relationships     Social connections:     Talks on phone: Not on file     Gets together: Not on file     Attends Oriental orthodox service: Not on file     Active member of club or organization: Not on file     Attends  "meetings of clubs or organizations: Not on file     Relationship status: Not on file     Intimate partner violence:     Fear of current or ex partner: Not on file     Emotionally abused: Not on file     Physically abused: Not on file     Forced sexual activity: Not on file   Other Topics Concern     Parent/sibling w/ CABG, MI or angioplasty before 65F 55M? Not Asked   Social History Narrative     Not on file       Allergies:  Patient has no known allergies.    Current Outpatient Medications   Medication Sig Dispense Refill     etonogestrel (IMPLANON/NEXPLANON) 68 MG IMPL 1 each (68 mg) by Subdermal route continuous  0     clotrimazole-betamethasone (LOTRISONE) cream Apply topically 2 times daily (Patient not taking: Reported on 8/8/2019) 30 g 1     fluocinonide (LIDEX) 0.05 % external ointment Apply sparingly to affected area twice daily. Do not apply to face. (Patient not taking: Reported on 8/8/2019) 30 g 1     nabumetone (RELAFEN) 750 MG tablet Take 1 tablet (750 mg) by mouth 2 times daily (Patient not taking: Reported on 8/8/2019) 30 tablet 0     order for DME Right wrist splint (Patient not taking: Reported on 7/1/2019) 1 Units 0       Review Of Systems   ROS: 10 point ROS neg other than the symptoms noted above in the HPI.    Exam:  /76   Ht 1.626 m (5' 4\")   Wt 72.6 kg (160 lb)   LMP  (LMP Unknown)   Breastfeeding? No   BMI 27.46 kg/m    {Constitutional: healthy, alert and no distress  Head: Normocephalic. No masses, lesions, tenderness or abnormalities  Neck: Neck supple. No adenopathy. Thyroid symmetric, normal size,, Carotids without bruits.  Breast:  breasts symmetric, no dominant or suspicious mass, no skin or nipple changes, no axillary adenopathy, unchanged from previous exam or self exam in taught and encouraged I was unable to demonstrate any bloody discharge today      Assessment/Plan:  (N64.52) Bloody discharge from right nipple  (primary encounter diagnosis)  Comment: I reviewed these " findings with the patient and discussed the risk benefits and alternative forms of therapy.  We discussed diagnostic possibilities including ductal ectasia or intraductal polyps  Plan: MA Diagnostic Digital Bilateral        I recommended a diagnostic mammogram and ultrasound if indicated with appropriate therapy to follow.  May consider surgical consultation as appropriate if indicated written plan given      Jack Anne M.D.

## 2019-08-11 NOTE — PROGRESS NOTES
Echo Mcgovern is a 23 year old female  at 36.5 wsk presents for eval of an acute onset of headache pain and visual disturbance that began earlier this afternoon while walking [employer].  She took her BP 3 times and had repeated elevations of > 150/98.  Pt denies RUQ pain  PIH labs from earlier this afternoon rev  DTR's 2+ no clonus    A/P: acute onset of H/A, visual changes and elevated BP in a pt with past hx of pre-eclampsia  Will send pt to L&D for monitoring with appropriate Rx to follow  Risks, benefits, and alternative modes of therapy discussed at length. Pathophysiology of the disease process reviewed, all of the patients questions answered and informed consent obtained.    
I spoke with the patient about this.  See Ov notes  Jack Anne M.D.  
posterior

## 2019-08-14 ENCOUNTER — HOSPITAL ENCOUNTER (OUTPATIENT)
Dept: ULTRASOUND IMAGING | Facility: CLINIC | Age: 24
Discharge: HOME | End: 2019-08-14
Attending: OBSTETRICS & GYNECOLOGY | Admitting: OBSTETRICS & GYNECOLOGY
Payer: COMMERCIAL

## 2019-08-14 DIAGNOSIS — N64.52 BLOODY DISCHARGE FROM RIGHT NIPPLE: ICD-10-CM

## 2019-08-14 PROCEDURE — 76642 ULTRASOUND BREAST LIMITED: CPT | Mod: RT

## 2019-08-20 NOTE — RESULT ENCOUNTER NOTE
Echo, the results of the breast ultrasound did not find a cause for the nipple discharge.  As mentioned by the radiologist of breast MRI may be helpful to further find a cause.  I would also like to have you consider seeing one of our general surgeons for a second opinion.  Please let me know if you have any additional questions  Thank you  Jack Anne M.D.

## 2020-05-06 ENCOUNTER — VIRTUAL VISIT (OUTPATIENT)
Dept: OBGYN | Facility: CLINIC | Age: 25
End: 2020-05-06
Payer: COMMERCIAL

## 2020-05-06 VITALS — BODY MASS INDEX: 26.26 KG/M2 | WEIGHT: 153 LBS

## 2020-05-06 DIAGNOSIS — Z97.5 BREAKTHROUGH BLEEDING ON NEXPLANON: Primary | ICD-10-CM

## 2020-05-06 DIAGNOSIS — N92.1 BREAKTHROUGH BLEEDING ON NEXPLANON: Primary | ICD-10-CM

## 2020-05-06 PROCEDURE — 99213 OFFICE O/P EST LOW 20 MIN: CPT | Performed by: ADVANCED PRACTICE MIDWIFE

## 2020-05-06 RX ORDER — LEVONORGESTREL/ETHIN.ESTRADIOL 0.1-0.02MG
1 TABLET ORAL DAILY
Qty: 84 TABLET | Refills: 0 | Status: SHIPPED | OUTPATIENT
Start: 2020-05-06 | End: 2020-07-21

## 2020-05-06 NOTE — NURSING NOTE
"Chief Complaint   Patient presents with     Vaginal Bleeding     Reports that she has had light bleeding for about 3 months       Initial Wt 69.4 kg (153 lb)   LMP  (LMP Unknown)   Breastfeeding No   BMI 26.26 kg/m   Estimated body mass index is 26.26 kg/m  as calculated from the following:    Height as of 19: 1.626 m (5' 4\").    Weight as of this encounter: 69.4 kg (153 lb).  BP completed using cuff size: regular    Questioned patient about current smoking habits.  Pt. has never smoked.          Luis Manuel Marin, LUANA             "

## 2020-05-06 NOTE — PATIENT INSTRUCTIONS
Birth Control Pills    Combination birth control pills contain both estrogen and progestin.  There are numerous brands of birth control pills otherwise known as oral contraceptive pills (OCP's).  Each brand has a different combination of estrogen and progestin so every woman can find the one that is right for her.  OCP's are a safe and effective way to prevent pregnancy in most women.    How do OCP's work  OCP's work by several different mechanisms.  They cause changes in the cervix and the lining of the uterus.  The cervical mucus becomes thicker which will prevent the sperm from entering the cervix.  The lining of the uterus becomes thin which helps prevent an egg from attaching to it.  In combination, these events make it unlikely that you will get pregnant. It may also prevent ovulation completely.    Benefits of OCP's  May reduce your risk of:  Cancer of the uterus and ovary, ovarian cysts, pelvic infection, bone loss, benign breast disease, anemia, ectopic pregnancy and acne.  It may also decrease symptoms of PCOS (Polycystic Ovarian Syndrome). OCP's may also improve cramping during menstrual cycle and may make you cycle shorter and lighter.    Each pill pack comes with instructions.  Please make sure you read them and understand these instructions.    Who should not take Combined OCP's    If you have a history or have blood clots    A history of cerebral vascular accident (stroke)    If you have ischemic heart or coronary artery disease    Known of suspected breast cancer    Known or suspected pregnancy    Smoker and over age 35    Any know liver abnormality    Migraine headaches with an aura    Undiagnosed abnormal vaginal bleeding    High blood pressure    Common side effects when starting OCP's  Headache, nausea, dizziness, breakthrough bleeding, missed periods, tender breasts, depression and anxiety.  Most side effects are minor and resolve in the first few months. Take the pill with meals or at bedtime  if nausea occurs.    Call or return for care in the following circumstances:      Unexpected missed periods or very heavy bleeding    Persistent vaginal bleeding    Depression    Suspected pregnancy    Persistent side effects such as:  Nausea, irregular menses or mood changes.    Seek emergency care immediately for the following:  ACHES    Abdominal or pelvic pain    Chest pain    Severe headache     Visual disturbances    Severe leg pain or numbness or tingling of extremities      Please call the clinic with questions and concerns  Donya Freeman  [phone]

## 2020-05-06 NOTE — PROGRESS NOTES
"Echo Mcgovern is a 24 year old female who is being evaluated via a billable telephone visit.      The patient has been notified of following:     \"This telephone visit will be conducted via a call between you and your physician/provider. We have found that certain health care needs can be provided without the need for a physical exam.  This service lets us provide the care you need with a short phone conversation.  If a prescription is necessary we can send it directly to your pharmacy.  If lab work is needed we can place an order for that and you can then stop by our lab to have the test done at a later time.    Telephone visits are billed at different rates depending on your insurance coverage. During this emergency period, for some insurers they may be billed the same as an in-person visit.  Please reach out to your insurance provider with any questions.    If during the course of the call the physician/provider feels a telephone visit is not appropriate, you will not be charged for this service.\"    Patient has given verbal consent for Telephone visit?  Yes    What phone number would you like to be contacted at?     How would you like to obtain your AVS? Jayla        S: Gina reports daily bleeding x3 months. Currently on Nexplanon since October 2018. She reports no other issues with bleeding since its placement besides irregular, untimed spotting as expected. Prior to having children, she used Implanon, also without complication.    She describes bleeding as a constant period-like flow. At her heaviest days, she changes a super tampon every 3 hours. Never slows down more than light-moderate flow. She experienced cramps initially, for which she took 400 mg ibuprofen per day. She denies significant pain at this time. Also denies symptoms of anemia including lightheadedness, dizziness, or fatigue. She reports she is taking a daily iron supplement.     O: Deferred; telephone visit    A: (N92.1,  Z97.5) " Breakthrough bleeding on Nexplanon  (primary encounter diagnosis)  Plan: levonorgestrel-ethinyl estradiol (AVIANE)         0.1-20 MG-MCG tablet    P: Discussed options of high dose NSAID therapy x1 week vs trial of COCs. Patient prefers to try COCs at this time. She does not have any contraindications to COCs including current active smoker over 35, migraines, history of blood clots or clotting disorders, hypertension, and liver disease.    Recommend cyclic monophasic OCPs for three months.  Continue iron supplement and fortify diet with iron-rich foods.  RTC if bleeding persists or worsens.      Phone call duration: 10 minutes    Chanel Victor, MOY, APRN, CNM

## 2020-06-17 ENCOUNTER — ALLIED HEALTH/NURSE VISIT (OUTPATIENT)
Dept: FAMILY MEDICINE | Facility: CLINIC | Age: 25
End: 2020-06-17
Payer: COMMERCIAL

## 2020-06-17 DIAGNOSIS — Z11.1 VISIT FOR MANTOUX TEST: Primary | ICD-10-CM

## 2020-06-17 PROCEDURE — 99207 ZZC NO CHARGE NURSE ONLY: CPT

## 2020-06-17 PROCEDURE — 86580 TB INTRADERMAL TEST: CPT

## 2020-06-19 ENCOUNTER — ALLIED HEALTH/NURSE VISIT (OUTPATIENT)
Dept: FAMILY MEDICINE | Facility: CLINIC | Age: 25
End: 2020-06-19
Payer: COMMERCIAL

## 2020-06-19 DIAGNOSIS — Z11.1 VISIT FOR MANTOUX TEST: Primary | ICD-10-CM

## 2020-06-19 LAB
PPDINDURATION: 0 MM (ref 0–5)
PPDREDNESS: 0 MM

## 2020-06-19 PROCEDURE — 99207 ZZC NO CHARGE NURSE ONLY: CPT

## 2020-06-29 ENCOUNTER — ALLIED HEALTH/NURSE VISIT (OUTPATIENT)
Dept: FAMILY MEDICINE | Facility: CLINIC | Age: 25
End: 2020-06-29
Payer: COMMERCIAL

## 2020-06-29 DIAGNOSIS — Z11.1 VISIT FOR MANTOUX TEST: Primary | ICD-10-CM

## 2020-06-29 PROCEDURE — 86580 TB INTRADERMAL TEST: CPT

## 2020-06-29 PROCEDURE — 99207 ZZC NO CHARGE NURSE ONLY: CPT

## 2020-07-01 ENCOUNTER — ALLIED HEALTH/NURSE VISIT (OUTPATIENT)
Dept: FAMILY MEDICINE | Facility: CLINIC | Age: 25
End: 2020-07-01
Payer: COMMERCIAL

## 2020-07-01 DIAGNOSIS — Z11.1 VISIT FOR MANTOUX TEST: Primary | ICD-10-CM

## 2020-07-01 LAB
PPDINDURATION: 0 MM (ref 0–5)
PPDREDNESS: 0 MM

## 2020-07-01 PROCEDURE — 99207 ZZC NO CHARGE NURSE ONLY: CPT

## 2020-07-01 NOTE — PROGRESS NOTES
[facility] result:  Lab Results   Component Value Date    PPDREDNESS 0.0 06/19/2020    PPDINDURATIO 0.0 06/19/2020     Is induration greater than 5mm?  Marj Malone RN

## 2020-07-21 ENCOUNTER — ANCILLARY PROCEDURE (OUTPATIENT)
Dept: GENERAL RADIOLOGY | Facility: CLINIC | Age: 25
End: 2020-07-21
Attending: PREVENTIVE MEDICINE
Payer: COMMERCIAL

## 2020-07-21 ENCOUNTER — OFFICE VISIT (OUTPATIENT)
Dept: URGENT CARE | Facility: URGENT CARE | Age: 25
End: 2020-07-21
Payer: COMMERCIAL

## 2020-07-21 VITALS
TEMPERATURE: 99.8 F | HEART RATE: 91 BPM | SYSTOLIC BLOOD PRESSURE: 133 MMHG | BODY MASS INDEX: 28.49 KG/M2 | WEIGHT: 166 LBS | DIASTOLIC BLOOD PRESSURE: 89 MMHG | OXYGEN SATURATION: 98 %

## 2020-07-21 DIAGNOSIS — M25.571 PAIN IN JOINT, ANKLE AND FOOT, RIGHT: ICD-10-CM

## 2020-07-21 DIAGNOSIS — M25.571 PAIN IN JOINT, ANKLE AND FOOT, RIGHT: Primary | ICD-10-CM

## 2020-07-21 PROCEDURE — 73610 X-RAY EXAM OF ANKLE: CPT | Mod: RT

## 2020-07-21 PROCEDURE — 99213 OFFICE O/P EST LOW 20 MIN: CPT | Performed by: PREVENTIVE MEDICINE

## 2020-07-21 PROCEDURE — 73630 X-RAY EXAM OF FOOT: CPT | Mod: RT

## 2020-07-21 NOTE — PROGRESS NOTES
SUBJECTIVE:  Chief Complaint   Patient presents with     Musculoskeletal Problem     right side - landed on it - popped      Echo Mcgovern is a 24 year old female presents with a chief complaint of right ankle pain.  The injury occurred 1 day(s) ago.   The injury happened while at home. How: twistedimmediate pain.  The patient complained of mild pain  and has not had decreased ROM.  Pain exacerbated by weight-bearing.  Relieved by rest.  She treated it initially with no therapy. This is not the first time this type of injury has occurred to this patient.     Past Medical History:   Diagnosis Date     Abnormal Pap smear of cervix 02/17/2016    see problem list     H/O pre-eclampsia in prior pregnancy, currently pregnant      Current Outpatient Medications   Medication Sig Dispense Refill     etonogestrel (IMPLANON/NEXPLANON) 68 MG IMPL 1 each (68 mg) by Subdermal route continuous  0     Social History     Tobacco Use     Smoking status: Never Smoker     Smokeless tobacco: Never Used   Substance Use Topics     Alcohol use: No     Family History   Problem Relation Age of Onset     Family History Negative Mother        ROS:  Review of systems negative except as stated above.    EXAM:   /89 (BP Location: Right arm, Cuff Size: Adult Regular)   Pulse 91   Temp 99.8  F (37.7  C) (Tympanic)   Wt 75.3 kg (166 lb)   SpO2 98%   BMI 28.49 kg/m    Gen: healthy,alert,no distress  Extremity: ankle has swelling and point tenderness lateral malleolus.   There is not compromise to the distal circulation.  Pulses are +2 and CRT is brisk  GENERAL APPEARANCE: healthy, alert and no distress  EXTREMITIES: peripheral pulses normal  SKIN: no suspicious lesions or rashes  NEURO: Normal strength and tone, sensory exam grossly normal, mentation intact and speech normal    X-RAY was done of r ankle and r foot - negative for fracture    ASSESSMENT:   Right ankle sprain    PLAN:  Tylenol, Rest, Ice, Compress, Elevate

## 2020-09-14 ENCOUNTER — OFFICE VISIT (OUTPATIENT)
Dept: OBGYN | Facility: CLINIC | Age: 25
End: 2020-09-14
Payer: COMMERCIAL

## 2020-09-14 VITALS — SYSTOLIC BLOOD PRESSURE: 122 MMHG | BODY MASS INDEX: 28.49 KG/M2 | WEIGHT: 166 LBS | DIASTOLIC BLOOD PRESSURE: 78 MMHG

## 2020-09-14 DIAGNOSIS — N63.15 BREAST LUMP ON RIGHT SIDE AT 12 O'CLOCK POSITION: Primary | ICD-10-CM

## 2020-09-14 PROCEDURE — 99214 OFFICE O/P EST MOD 30 MIN: CPT | Performed by: OBSTETRICS & GYNECOLOGY

## 2020-09-14 ASSESSMENT — ENCOUNTER SYMPTOMS
FEVER: 0
COLOR CHANGE: 0
CHILLS: 0

## 2020-09-14 NOTE — PROGRESS NOTES
SUBJECTIVE:                                                   Echo Mcgovern is a 25 year old female who presents to clinic today with the Chief Complaint of:  Patient presents with:  Breast Problem: Right sided breast lump       Concern - Right breast lump  Onset: 2 days ago    Description:   Pt noted some pain in upper right breast, then on SBE noted a small lump at that spot.  No nipple discharge or skin changes.  No lumps in left breast.  About 1 year ago she had spontaneous right bloody nipple discharge while not lactating, and saw Dr. Anne.  A right breast U/S was neg, and MRI was considered but deferred by Pt.  She has had no similar discharge since.    Intensity: mild    Progression of Symptoms:  same    Accompanying Signs & Symptoms:  No nipple discharge, skin changes over lump.    Previous history of similar problem:   No prior lump, but bloody discharge as noted above.    Precipitating factors:   Worsened by: Nothing    Alleviating factors:  Improved by: Nothing    Therapies Tried and outcome: None    Review of pertinent old records reveals:  The above Hx from 1 year ago      Patient's last menstrual period was 2020 (within days)..   Patient is sexually active, .  Using Nexplanon for contraception.    reports that she has never smoked. She has never used smokeless tobacco.    Problem list and histories reviewed & adjusted, as indicated.  Additional history: as documented.    Patient Active Problem List   Diagnosis     Indication for care in labor or delivery     Elevated blood pressure reading without diagnosis of hypertension     ASCUS of cervix with negative high risk HPV     Past Surgical History:   Procedure Laterality Date     HAND SURGERY  2015    right hand- fractured hand, had 2 screws placed     KNEE SURGERY  2011    Torn ACL, Left knee      Social History     Tobacco Use     Smoking status: Never Smoker     Smokeless tobacco: Never Used   Substance Use Topics     Alcohol  use: No      Problem (# of Occurrences) Relation (Name,Age of Onset)    Family History Negative (1) Mother            Current Outpatient Medications   Medication Sig     etonogestrel (IMPLANON/NEXPLANON) 68 MG IMPL 1 each (68 mg) by Subdermal route continuous     No current facility-administered medications for this visit.      No Known Allergies    ROS:  Review of Systems   Constitutional: Negative for chills and fever.   Skin: Negative for color change and rash.   All other systems reviewed and are negative.        OBJECTIVE:     /78 (BP Location: Right arm, Patient Position: Sitting, Cuff Size: Adult Regular)   Wt 75.3 kg (166 lb)   LMP 08/31/2020 (Within Days)   BMI 28.49 kg/m    Body mass index is 28.49 kg/m .    Exam:  Physical Exam  Constitutional:       General: She is not in acute distress.     Appearance: She is normal weight. She is not ill-appearing.   HENT:      Head: Normocephalic.   Chest:      Breasts:         Right: Mass present. No swelling, bleeding, inverted nipple, nipple discharge, skin change or tenderness.         Left: No swelling, bleeding, inverted nipple, mass, nipple discharge, skin change or tenderness.       Skin:     General: Skin is warm.   Neurological:      Mental Status: She is alert.   Psychiatric:         Mood and Affect: Mood normal.           ASSESSMENT:                                                      Encounter Diagnoses   Name Primary?     Breast lump on right side at 12 o'clock position Yes         PLAN:                                                      1)  Right breast U/S  2)  Depending on U/S findings, further w/u as indicated.      Dylan Lynch MD  [facility]

## 2020-09-14 NOTE — NURSING NOTE
"Chief Complaint   Patient presents with     Breast Problem     Right sided breast lump        Initial /78 (BP Location: Right arm, Patient Position: Sitting, Cuff Size: Adult Regular)   Wt 75.3 kg (166 lb)   LMP 2020 (Within Days)   BMI 28.49 kg/m   Estimated body mass index is 28.49 kg/m  as calculated from the following:    Height as of 19: 1.626 m (5' 4\").    Weight as of this encounter: 75.3 kg (166 lb).  BP completed using cuff size: regular    Questioned patient about current smoking habits.  Pt. has never smoked.          The following HM Due: NONE    Keagan Glass CMA                "

## 2020-09-15 ENCOUNTER — HOSPITAL ENCOUNTER (OUTPATIENT)
Dept: MAMMOGRAPHY | Facility: CLINIC | Age: 25
End: 2020-09-15
Attending: OBSTETRICS & GYNECOLOGY
Payer: COMMERCIAL

## 2020-09-15 ENCOUNTER — HOSPITAL ENCOUNTER (OUTPATIENT)
Dept: ULTRASOUND IMAGING | Facility: CLINIC | Age: 25
End: 2020-09-15
Attending: OBSTETRICS & GYNECOLOGY
Payer: COMMERCIAL

## 2020-09-15 DIAGNOSIS — N63.15 BREAST LUMP ON RIGHT SIDE AT 12 O'CLOCK POSITION: ICD-10-CM

## 2020-09-15 PROCEDURE — G0279 TOMOSYNTHESIS, MAMMO: HCPCS

## 2020-09-15 PROCEDURE — 76642 ULTRASOUND BREAST LIMITED: CPT | Mod: RT

## 2020-10-26 ENCOUNTER — OFFICE VISIT (OUTPATIENT)
Dept: OBGYN | Facility: CLINIC | Age: 25
End: 2020-10-26
Payer: COMMERCIAL

## 2020-10-26 VITALS — SYSTOLIC BLOOD PRESSURE: 104 MMHG | BODY MASS INDEX: 29.18 KG/M2 | WEIGHT: 170 LBS | DIASTOLIC BLOOD PRESSURE: 72 MMHG

## 2020-10-26 DIAGNOSIS — Z30.011 ENCOUNTER FOR BCP (BIRTH CONTROL PILLS) INITIAL PRESCRIPTION: ICD-10-CM

## 2020-10-26 DIAGNOSIS — Z30.432 ENCOUNTER FOR REMOVAL OF INTRAUTERINE CONTRACEPTIVE DEVICE: Primary | ICD-10-CM

## 2020-10-26 PROCEDURE — 99213 OFFICE O/P EST LOW 20 MIN: CPT | Mod: 25 | Performed by: ADVANCED PRACTICE MIDWIFE

## 2020-10-26 PROCEDURE — 11982 REMOVE DRUG IMPLANT DEVICE: CPT | Performed by: ADVANCED PRACTICE MIDWIFE

## 2020-10-26 RX ORDER — DESOGESTREL AND ETHINYL ESTRADIOL 0.15-0.03
1 KIT ORAL DAILY
Qty: 84 TABLET | Refills: 4 | Status: SHIPPED | OUTPATIENT
Start: 2020-10-26 | End: 2021-12-07

## 2020-10-26 NOTE — PATIENT INSTRUCTIONS
Birth Control Pills    Combination birth control pills contain both estrogen and progestin.  There are numerous brands of birth control pills otherwise known as oral contraceptive pills (OCP's).  Each brand has a different combination of estrogen and progestin so every woman can find the one that is right for her.  OCP's are a safe and effective way to prevent pregnancy in most women.    How do OCP's work  OCP's work by several different mechanisms.  They cause changes in the cervix and the lining of the uterus.  The cervical mucus becomes thicker which will prevent the sperm from entering the cervix.  The lining of the uterus becomes thin which helps prevent an egg from attaching to it.  In combination, these events make it unlikely that you will get pregnant. It may also prevent ovulation completely.    Benefits of OCP's  May reduce your risk of:  Cancer of the uterus and ovary, ovarian cysts, pelvic infection, bone loss, benign breast disease, anemia, ectopic pregnancy and acne.  It may also decrease symptoms of PCOS (Polycystic Ovarian Syndrome). OCP's may also improve cramping during menstrual cycle and may make you cycle shorter and lighter.    How to take OCP's  You have several choices on how to start taking your OCP's:    You can start the pill on the first day of your next period    You can start the pill on the Sunday after your next period starts    You can start the pill on the first day it was prescribed no matter where you are in your cycle.  In this case, you will need to make sure you are not pregnant.    No matter when you start your first pack, you will always start your next pack on the same day you started your first pack.    You should take the pill at the same time every day.  Do not skip any pills.  If you miss any pills, are taking antibiotics or vomit, use a backup method of birth control until you get your next period.    Pills come in packs of 21, 28 or 91 pills:      21 Pills:  Take one  pill at the same time every day for 21 days.  Wait 7 days before beginning your next pack.  During these 7 days you will have your period.    28 Pills:  Take one pill at the same time every day for 28 days.  The last 7 pills in the pack do not contain estrogen/progestin.  During these 7 days you will have your period.      91 Pills:  Take one pill at the same time every day for 91 days.  The last 7 pills in the pack do not contain estrogen/progestin.  During these 7 days you will have your period.  With this method you will only have 4 periods a year.  Some women eventually have no bleeding at all.    Each pill pack comes with instructions.  Please make sure you read them and understand these instructions.      What to do if you miss a pill    Occasionally you may forget to take a pill or not take it on time.  Take the missed pill as soon as you remember.  Take the next pill at the regular time.  It is ok if you take two pills in one day.  You may feel a bit queasy or have some spotting, this is normal and should not be concerning.  If you have missed more than one pill use a back up method of birth control and call the clinic for instructions on how to proceed.    Who should not take Combined OCP's    If you have a history or have blood clots    A history of cerebral vascular accident (stroke)    If you have ischemic heart or coronary artery disease    Known of suspected breast cancer    Known or suspected pregnancy    Smoker and over age 35    Any know liver abnormality    Migraine headaches with an aura    Undiagnosed abnormal vaginal bleeding    High blood pressure    Common side effects when starting OCP's  Headache, nausea, dizziness, breakthrough bleeding, missed periods, tender breasts, depression and anxiety.  Most side effects are minor and resolve in the first few months. Take the pill with meals or at bedtime if nausea occurs.    Call or return for care in the following circumstances:      Unexpected  missed periods or very heavy bleeding    Persistent vaginal bleeding    Depression    Suspected pregnancy    Persistent side effects such as:  Nausea, irregular menses or mood changes.    Seek emergency care immediately for the following:  ACHES    Abdominal or pelvic pain    Chest pain    Severe headache     Visual disturbances    Severe leg pain or numbness or tingling of extremities    Lastly-    Use of a backup method is recommended for the first cycle    Condoms are recommended to protect against STI's    OCP's are 99% effective if take correctly.    The pill helps to keep your periods regular, lighter and shorter and reduces cramps.  If you desire a pregnancy, you may stop taking your OCPs.     Please call the clinic with questions and concerns  [facility]  [phone]

## 2020-10-26 NOTE — PROGRESS NOTES
"  Nexplanon Removal:     Is a pregnancy test required: {Pregnancy test required:482684}  Was a consent obtained?  {Yes/No:767295::\"Yes\"}    Echo Mcgovern is here for removal of etonogestrel implant Nexplanon/Implanon    Indication: ***      Preoperative Diagnosis: etonogestrel implant  Postoperative Diagnosis: etonogestrel implant removed    Technique: On the {left/right:912167} arm  Skin prep {BETADINE/TECHNICARE:134373799}  Anesthesia {LIDOCAINE:060785514}  Procedure: Small incision (<5mm) was made at distal end of palpable implant, curved hemostat or mosquito forceps was used to isolate the implant and bring it to the incision, the fibrous capsule containing the implant  was incised and the Implant was removed intact.    Lot # ***  Exp: ***    EBL: minimal  Complications:  {YES / NO SURGERY:944698}  Tolerance:  Pt tolerated procedure well and was in stable condition.   Dressing:    A pressure bandage was placed for the next 12-24 hours.    Contraception was discussed and patient chose the following method {CONTRACEPTION:706}      Follow up: Pt was instructed to call if bleeding, severe pain or foul smell.     LYNNE Iverson CNM  "

## 2020-10-26 NOTE — NURSING NOTE
"Chief Complaint   Patient presents with     Contraception     Nexplanon removal and discuss other options       Initial /72 (BP Location: Left arm, Cuff Size: Adult Regular)   Wt 77.1 kg (170 lb)   BMI 29.18 kg/m   Estimated body mass index is 29.18 kg/m  as calculated from the following:    Height as of 19: 1.626 m (5' 4\").    Weight as of this encounter: 77.1 kg (170 lb).  BP completed using cuff size: regular    Questioned patient about current smoking habits.  Pt. has never smoked.          The following HM Due: NONE  Sissy Reinoso CMA    "

## 2020-10-26 NOTE — PROGRESS NOTES
CC: removal of Nexplanon    HPI:   Echo Mcgovern is a 25 year old  who presents to clinic today to have her Nexplanon removed. It was inserted on 10/26/2018 in her right side arm. She desires Nexplanon removal because of persistent vaginal bleeding.  She tried adding COCs, which stopped her bleeding, but the bleeding resumed when she stopped. She does not want to be on two forms of birth control. All methods of birth control including oral contraceptive pills, patches, vaginal ring, implant, shot, IUD (hormonal and copper), barrier methods discussed including risks, benefits, and alternatives to each. She wishes to have the Nexplanon removed, and  plans on using OCPs.     Reviewed PMH, PSH, social and family history. Changes made in EPIC.    CONSTITUTIONAL: NEGATIVE for fever, chills, change in weight  BREAST: NEGATIVE for masses, tenderness or discharge  GI: NEGATIVE for nausea, abdominal pain, heartburn, or change in bowel habits  : NEGATIVE for unusual urinary or vaginal symptoms. Periods are very irregular.  NEURO: NEGATIVE for weakness, dizziness or paresthesias, headaches  HEME/ALLERGY/IMMUNE: NEGATIVE for bleeding problems  PSYCHIATRIC: NEGATIVE for changes in mood or affect    OBJECTIVE:  Vitals:    10/26/20 1528   BP: 104/72   BP Location: Left arm   Cuff Size: Adult Regular   Weight: 77.1 kg (170 lb)       Exam:  Constitutional: healthy, alert and no distress  Respiratory: negative, Percussion normal. Good diaphragmatic excursion.  Psychiatric: mentation appears normal and affect normal/bright    PROCEDURE:  Verbal and written consent obtained. Discussed risk of bleeding, infection, inability to remove device and bruising. Nexplanon device was palpated in her right side arm. The area was cleansed with betadine x3. Sterile gloves were donned. Then lidocaine 1% was injected under the skin at the distal end of the device. A 2mm incision was made with a scalpel over the distal end of the device,  then the device was grasped with a sterile Kellie clamp. The fibrous sheath encasing the Nexplanon was incised with a scalpel, then the device was removed without difficulty, intact. The incision was covered with a band-aid and the arm was wrapped with a pressure dressing for 6 hours. Patient tolerated procedure well.     ASSESSMENT:  Echo Mcgovern is a 25 year old  who had Nexplanon removed today.    ICD-10-CM    1. Encounter for removal of intrauterine contraceptive device  Z30.432 REMOVAL NEXPLANON   2. Encounter for BCP (birth control pills) initial prescription  Z30.011 desogestrel-ethinyl estradiol (APRI) 0.15-30 MG-MCG tablet        PLAN:     1.  Nexplanon removed without difficulty    2. Prescription for oral birth control pills sent to pharmacy.  The use of the oral contraceptive pill has been fully discussed with the patient. This includes the proper method to initiate  and continue the pill, the need for regular compliance to ensure adequate contraceptive effect, the physiology which makes the pill effective, the instructions for what to do in event of a missed pill, and warnings about anticipated minor side effects such as breakthrough spotting, nausea, breast tenderness, weight changes, acne, headaches, etc. She was informed of the irregular bleeding pattern that can occur when the pill is first started or a new form is changed over for the first 2-3 months.  She has been told of the more serious potential side effects such as MI, stroke, and deep vein thrombosis, all of which are very unlikely.  She has been asked to report any signs of such serious problems immediately.   She understands and wishes to take the medication as prescribed.    15 minutes was spent face to face with the patient today discussing her history, diagnosis, and follow-up plan as noted above. Over 50% of the visit was spent in counseling and coordination of care.        Patient to return to clinic for annual exam, sooner  PRN.    EMETERIO STEELE CNM

## 2020-11-11 ENCOUNTER — VIRTUAL VISIT (OUTPATIENT)
Dept: FAMILY MEDICINE | Facility: OTHER | Age: 25
End: 2020-11-11
Payer: COMMERCIAL

## 2020-11-11 PROCEDURE — 99421 OL DIG E/M SVC 5-10 MIN: CPT | Performed by: NURSE PRACTITIONER

## 2020-11-11 NOTE — PROGRESS NOTES
"Date: 2020 15:28:23  Clinician: Minerva Morales  Clinician NPI: [id number]  Patient: Echo Mcgovern  Patient : 1995  Patient Address: [address] [id number]  Patient Phone: [phone]  Visit Protocol: URI  Patient Summary:  Echo is a 25 year old ( : 1995 ) female who initiated a [facility] Visit for COVID-19 (Coronavirus) evaluation and screening. When asked the question \"Please sign me up to receive news, health information and promotions from [facility].\", Echo responded \"No\".    Echo states her symptoms started today.   Her symptoms consist of rhinitis, myalgia, chills, malaise, diarrhea, a headache, a cough, nasal congestion, and nausea. She is experiencing mild difficulty breathing with activities but can speak normally in full sentences. Echo also feels feverish.   Symptom details     Nasal secretions: The color of her mucus is white.    Cough: Echo coughs a few times an hour and her cough is more bothersome at night. Phlegm does not come into her throat when she coughs. She does not believe her cough is caused by post-nasal drip.     Temperature: Her current temperature is 100.2 degrees Fahrenheit. Echo has had a temperature over 100 degrees Fahrenheit for 1-2 days.     Headache: She states the headache is mild (1-3 on a 10 point pain scale).      Echo denies having vomiting, facial pain or pressure, sore throat, teeth pain, ageusia, ear pain, wheezing, and anosmia. She also denies taking antibiotic medication in the past month, having recent facial or sinus surgery in the past 60 days, and having a sinus infection within the past year.   Precipitating events  She has not recently been exposed to someone with influenza. Echo has been in close contact with the following high risk individuals: children under the age of 5.   Pertinent COVID-19 (Coronavirus) information  Echo does not work or volunteer as healthcare worker or a first " responder. In the past 14 days, Echo has not worked or volunteered at a healthcare facility or group living setting.   In the past 14 days, she also has not lived in a congregate living setting.   Echo has had a close contact with a laboratory-confirmed COVID-19 patient within 14 days of symptom onset. She was not exposed at her work. Date Echo was exposed to the laboratory-confirmed COVID-19 patient: 11/07/2020   Additional information about contact with COVID-19 (Coronavirus) patient as reported by the patient (free text): My children were with their grandma who became confirmed as of Monday Saturday night and I was in her house as wel    Since December 2019, Echo has not been tested for COVID-19 and has not had upper respiratory infection or influenza-like illness.   Pertinent medical history  Echo does not get yeast infections when she takes antibiotics.   Echo does not need a return to work/school note.   Weight: 165 lbs   Echo does not smoke or use smokeless tobacco.   She denies pregnancy and denies breastfeeding. Her last period was over a month ago.   Weight: 165 lbs    MEDICATIONS: Nexplanon subdermal, ALLERGIES: NKDA  Clinician Response:  Dear Echo,   Your symptoms show that you may have coronavirus (COVID-19). This illness can cause fever, cough and trouble breathing. Many people get a mild case and get better on their own. Some people can get very sick.  What should I do?  We would like to test you for this virus.   1. Please call [phone] to schedule your visit. Explain that you were referred by [facility] to have a COVID-19 test. Be ready to share your [facility] visit ID number.  * If you need to schedule in [facility] please call [phone] or for [employer] employees please call [phone].  * If you need to schedule in the [place] area please call [phone]. [employer] employees call [phone].  The following will serve as your written order for this  "COVID Test, ordered by me, for the indication of suspected COVID [Z20.828]: The test will be ordered in [employer]IQ, our electronic health record, after you are scheduled. It will show as ordered and authorized by Parth Connelly MD.  Order: COVID-19 (Coronavirus) PCR for SYMPTOMATIC testing from [facility].   2. When it's time for your COVID test:  Stay at least 6 feet away from others. (If someone will drive you to your test, stay in the backseat, as far away from the  as you can.)   Cover your mouth and nose with a mask, tissue or washcloth.  Go straight to the testing site. Don't make any stops on the way there or back.      3.Starting now: Stay home and away from others (self-isolate) until:   You've had no fever---and no medicine that reduces fever---for one full day (24 hours). And...   Your other symptoms have gotten better. For example, your cough or breathing has improved. And...   At least 10 days have passed since your symptoms started.       During this time, don't leave the house except for testing or medical care.   Stay in your own room, even for meals. Use your own bathroom if you can.   Stay away from others in your home. No hugging, kissing or shaking hands. No visitors.  Don't go to work, school or anywhere else.    Clean \"high touch\" surfaces often (doorknobs, counters, handles, etc.). Use a household cleaning spray or wipes. You'll find a full list of  on the EPA website: www.epa.gov/pesticide-registration/list-n-disinfectants-use-against-sars-cov-2.   Cover your mouth and nose with a mask, tissue or washcloth to avoid spreading germs.  Wash your hands and face often. Use soap and water.  Caregivers in these groups are at risk for severe illness due to COVID-19:  o People 65 years and older  o People who live in a nursing home or long-term care facility  o People with chronic disease (lung, heart, cancer, diabetes, kidney, liver, immunologic)  o People who have a weakened immune system, " including those who:   Are in cancer treatment  Take medicine that weakens the immune system, such as corticosteroids  Had a bone marrow or organ transplant  Have an immune deficiency  Have poorly controlled HIV or AIDS  Are obese (body mass index of 40 or higher)  Smoke regularly   o Caregivers should wear gloves while washing dishes, handling laundry and cleaning bedrooms and bathrooms.  o Use caution when washing and drying laundry: Don't shake dirty laundry, and use the warmest water setting that you can.  o For more tips, go to www.cdc.gov/coronavirus/2019-ncov/downloads/10Things.pdf.    4.Sign up for [employer]. We know it's scary to hear that you might have COVID-19. We want to track your symptoms to make sure you're okay over the next 2 weeks. Please look for an email from [employer]---this is a free, online program that we'll use to keep in touch. To sign up, follow the link in the email. Learn more at [email or web address]  How can I take care of myself?   Get lots of rest. Drink extra fluids (unless a doctor has told you not to).   Take Tylenol (acetaminophen) for fever or pain. If you have liver or kidney problems, ask your family doctor if it's okay to take Tylenol.   Adults can take either:    650 mg (two 325 mg pills) every 4 to 6 hours, or...   1,000 mg (two 500 mg pills) every 8 hours as needed.    Note: Don't take more than 3,000 mg in one day. Acetaminophen is found in many medicines (both prescribed and over-the-counter medicines). Read all labels to be sure you don't take too much.   For children, check the Tylenol bottle for the right dose. The dose is based on the child's age or weight.    If you have other health problems (like cancer, heart failure, an organ transplant or severe kidney disease): Call your specialty clinic if you don't feel better in the next 2 days.       Know when to call 911. Emergency warning signs include:    Trouble breathing or shortness of breath Pain  or pressure in the chest that doesn't go away Feeling confused like you haven't felt before, or not being able to wake up Bluish-colored lips or face.  Where can I get more information?   [facility] -- About COVID-19: [email or web address]   CDC -- What to Do If You're Sick: www.cdc.gov/coronavirus/2019-ncov/about/steps-when-sick.html   CDC -- Ending Home Isolation: www.cdc.gov/coronavirus/2019-ncov/hcp/disposition-in-home-patients.html   CDC -- Caring for Someone: www.cdc.gov/coronavirus/2019-ncov/if-you-are-sick/care-for-someone.html   [facility] -- Interim Guidance for Hospital Discharge to Home: www.health.[facility].mn./diseases/coronavirus/hcp/hospdischarge.pdf   [facility] clinical trials (COVID-19 research studies): clinicalaffairs.[facility]-clinical-trials    Below are the COVID-19 hotlines at the [facility] of Health ([facility]). Interpreters are available.    For health questions: Call 883-532-2298 or 1-817.776.7588 (7 a.m. to 7 p.m.) For questions about schools and childcare: Call 561-456-4546 or 1-281.672.7867 (7 a.m. to 7 p.m.)    Diagnosis: COVID-19  Diagnosis ICD: U07.1

## 2020-11-12 DIAGNOSIS — Z20.822 SUSPECTED COVID-19 VIRUS INFECTION: Primary | ICD-10-CM

## 2020-11-13 DIAGNOSIS — Z20.822 SUSPECTED COVID-19 VIRUS INFECTION: ICD-10-CM

## 2020-11-13 PROCEDURE — U0003 INFECTIOUS AGENT DETECTION BY NUCLEIC ACID (DNA OR RNA); SEVERE ACUTE RESPIRATORY SYNDROME CORONAVIRUS 2 (SARS-COV-2) (CORONAVIRUS DISEASE [COVID-19]), AMPLIFIED PROBE TECHNIQUE, MAKING USE OF HIGH THROUGHPUT TECHNOLOGIES AS DESCRIBED BY CMS-2020-01-R: HCPCS | Performed by: NURSE PRACTITIONER

## 2020-11-14 LAB
SARS-COV-2 RNA SPEC QL NAA+PROBE: ABNORMAL
SPECIMEN SOURCE: ABNORMAL

## 2020-12-22 ENCOUNTER — MYC MEDICAL ADVICE (OUTPATIENT)
Dept: FAMILY MEDICINE | Facility: CLINIC | Age: 25
End: 2020-12-22

## 2021-01-03 ENCOUNTER — HEALTH MAINTENANCE LETTER (OUTPATIENT)
Age: 26
End: 2021-01-03

## 2021-02-26 ENCOUNTER — OFFICE VISIT (OUTPATIENT)
Dept: INTERNAL MEDICINE | Facility: CLINIC | Age: 26
End: 2021-02-26
Payer: COMMERCIAL

## 2021-02-26 DIAGNOSIS — F41.9 ANXIETY: ICD-10-CM

## 2021-02-26 DIAGNOSIS — F33.0 MILD EPISODE OF RECURRENT MAJOR DEPRESSIVE DISORDER (H): ICD-10-CM

## 2021-02-26 DIAGNOSIS — R41.840 ATTENTION AND CONCENTRATION DEFICIT: Primary | ICD-10-CM

## 2021-02-26 DIAGNOSIS — H69.90 DYSFUNCTION OF EUSTACHIAN TUBE, UNSPECIFIED LATERALITY: ICD-10-CM

## 2021-02-26 PROCEDURE — 99213 OFFICE O/P EST LOW 20 MIN: CPT | Performed by: INTERNAL MEDICINE

## 2021-02-26 RX ORDER — MULTIPLE VITAMINS W/ MINERALS TAB 9MG-400MCG
1 TAB ORAL DAILY
COMMUNITY
End: 2023-01-19

## 2021-02-26 NOTE — PROGRESS NOTES
Assessment & Plan     Attention and concentration deficit  She has had symptoms suggestive of ADD in the past and currently affecting her schooling. She has some mood symptoms also though so recommend evaluation with formal testing, then can consider treatment.   - MENTAL HEALTH REFERRAL  - Adult; Assessments and Testing; ADHD; [facility]: [facility] [phone]; We will contact you to schedule the appointment or please call with any questions    Mild episode of recurrent major depressive disorder (H)  She has some mild symptoms but feels this is not bad enough to consider medication and may be influences by issues of focus. Proceed as above, consider treatment in future    Anxiety  As above    Dysfunction of Eustachian tube, unspecified laterality  Related to nasal issues, advised on otc          Return in about 3 months (around 5/26/2021) for Physical Exam.    Kaia Donahue MD  [facility] EFREN Fuentes is a 25 year old who presents for the following health issues     HPI       1. Issues with mood and focus: she is concerned she may have ADD. 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 She has some mild depression and anxiety symptoms but feels that these symptoms may be related to the problems she is having at school. She has had some remote mood issues.     2. Ears plugged: this started 2 days ago, constant. She has not had any pain, no nasal congestion.     Patient Active Problem List   Diagnosis     ASCUS of cervix with negative high risk HPV     Current Outpatient Medications   Medication Sig Dispense Refill     desogestrel-ethinyl estradiol (APRI) 0.15-30 MG-MCG tablet Take 1 tablet by mouth daily 84 tablet 4     Iron-Vitamin C (IRON 100/C PO)        multivitamin w/minerals (MULTI-VITAMIN) tablet Take 1 tablet by mouth daily        Social History     Tobacco Use      Smoking status: Never Smoker     Smokeless tobacco: Never Used   Substance Use Topics     Alcohol use: No     Drug use: No        Review of Systems   No ear pain, decreased hearing, URI symptoms.       Objective    /72   Pulse 119   Wt 74.3 kg (163 lb 14.4 oz)   LMP 02/12/2021   SpO2 99%   BMI 28.13 kg/m    Body mass index is 28.13 kg/m .  Physical Exam       Ears: dull TMs  Nasal mucosa with moderate edema

## 2021-02-28 PROBLEM — R03.0 ELEVATED BLOOD PRESSURE READING WITHOUT DIAGNOSIS OF HYPERTENSION: Status: RESOLVED | Noted: 2018-09-12 | Resolved: 2021-02-28

## 2021-03-02 ENCOUNTER — VIRTUAL VISIT (OUTPATIENT)
Dept: PSYCHOLOGY | Facility: CLINIC | Age: 26
End: 2021-03-02
Attending: INTERNAL MEDICINE
Payer: COMMERCIAL

## 2021-03-02 DIAGNOSIS — F43.23 ADJUSTMENT DISORDER WITH MIXED ANXIETY AND DEPRESSED MOOD: Primary | ICD-10-CM

## 2021-03-02 PROCEDURE — 90791 PSYCH DIAGNOSTIC EVALUATION: CPT | Mod: GT | Performed by: PSYCHOLOGIST

## 2021-03-02 ASSESSMENT — ANXIETY QUESTIONNAIRES
6. BECOMING EASILY ANNOYED OR IRRITABLE: MORE THAN HALF THE DAYS
IF YOU CHECKED OFF ANY PROBLEMS ON THIS QUESTIONNAIRE, HOW DIFFICULT HAVE THESE PROBLEMS MADE IT FOR YOU TO DO YOUR WORK, TAKE CARE OF THINGS AT HOME, OR GET ALONG WITH OTHER PEOPLE: SOMEWHAT DIFFICULT
5. BEING SO RESTLESS THAT IT IS HARD TO SIT STILL: SEVERAL DAYS
2. NOT BEING ABLE TO STOP OR CONTROL WORRYING: MORE THAN HALF THE DAYS
7. FEELING AFRAID AS IF SOMETHING AWFUL MIGHT HAPPEN: SEVERAL DAYS
1. FEELING NERVOUS, ANXIOUS, OR ON EDGE: MORE THAN HALF THE DAYS
3. WORRYING TOO MUCH ABOUT DIFFERENT THINGS: NEARLY EVERY DAY
GAD7 TOTAL SCORE: 14

## 2021-03-02 ASSESSMENT — PATIENT HEALTH QUESTIONNAIRE - PHQ9
5. POOR APPETITE OR OVEREATING: NEARLY EVERY DAY
SUM OF ALL RESPONSES TO PHQ QUESTIONS 1-9: 11

## 2021-03-02 ASSESSMENT — COLUMBIA-SUICIDE SEVERITY RATING SCALE - C-SSRS
1. IN THE PAST MONTH, HAVE YOU WISHED YOU WERE DEAD OR WISHED YOU COULD GO TO SLEEP AND NOT WAKE UP?: NO
1. IN THE PAST MONTH, HAVE YOU WISHED YOU WERE DEAD OR WISHED YOU COULD GO TO SLEEP AND NOT WAKE UP?: NO
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
## 2021-03-03 ENCOUNTER — DOCUMENTATION ONLY (OUTPATIENT)
Dept: PSYCHOLOGY | Facility: CLINIC | Age: 26
End: 2021-03-03

## 2021-03-03 ASSESSMENT — ANXIETY QUESTIONNAIRES: GAD7 TOTAL SCORE: 14

## 2021-03-03 NOTE — PROGRESS NOTES
Client Name: Gina Mcgovern   MRN: [id number]  : 1995    Client completed the [place] Multiphasic Personality Inventory-2 (MMPI-2), a self-report personality inventory, as part of her evaluation. Validity scales indicate that the client responded in a manner that suggests a sense of constraint in self-presentation that is designed to forestall negative judgment. There may be underreporting sufficient to distort the profile to the point of compromised reliability for personality description and clinical prediction. There may be a need to deny problems and weaknesses and to present an image of adequacy and self-control inconsistent with other clinical information.  Individuals with similar profiles experience withdrawal and a lack of drive, energy, interest, and motivation. They may complain of an inability to complete normal tasks and duties. They may have a tendency to give up quickly in the face of difficulty. They may experience a sense of mental failure or decline and the depletion of energy needed to accomplish mental work. Thinking and problem-solving are experienced as effortful and as subject to going off course even when significant effort is made. Thinking may be viewed as impaired or unreliable; they may have a sense that  I can t seem to get my mind right.

## 2021-03-10 VITALS
DIASTOLIC BLOOD PRESSURE: 72 MMHG | HEART RATE: 119 BPM | BODY MASS INDEX: 28.13 KG/M2 | OXYGEN SATURATION: 99 % | SYSTOLIC BLOOD PRESSURE: 104 MMHG | WEIGHT: 163.9 LBS

## 2021-03-10 PROBLEM — F33.0 MILD EPISODE OF RECURRENT MAJOR DEPRESSIVE DISORDER (H): Status: ACTIVE | Noted: 2021-03-10

## 2021-03-10 PROBLEM — F41.9 ANXIETY: Status: ACTIVE | Noted: 2021-03-10

## 2021-05-03 ENCOUNTER — ANCILLARY PROCEDURE (OUTPATIENT)
Dept: GENERAL RADIOLOGY | Facility: CLINIC | Age: 26
End: 2021-05-03
Attending: PHYSICIAN ASSISTANT
Payer: COMMERCIAL

## 2021-05-03 ENCOUNTER — OFFICE VISIT (OUTPATIENT)
Dept: URGENT CARE | Facility: URGENT CARE | Age: 26
End: 2021-05-03
Payer: COMMERCIAL

## 2021-05-03 VITALS
OXYGEN SATURATION: 100 % | SYSTOLIC BLOOD PRESSURE: 122 MMHG | HEART RATE: 72 BPM | DIASTOLIC BLOOD PRESSURE: 81 MMHG | WEIGHT: 166 LBS | TEMPERATURE: 98.7 F | BODY MASS INDEX: 28.49 KG/M2

## 2021-05-03 DIAGNOSIS — S69.92XA INJURY OF LEFT HAND, INITIAL ENCOUNTER: Primary | ICD-10-CM

## 2021-05-03 DIAGNOSIS — S69.92XA INJURY OF LEFT HAND, INITIAL ENCOUNTER: ICD-10-CM

## 2021-05-03 PROCEDURE — 99214 OFFICE O/P EST MOD 30 MIN: CPT | Performed by: PHYSICIAN ASSISTANT

## 2021-05-03 PROCEDURE — 73130 X-RAY EXAM OF HAND: CPT | Mod: LT | Performed by: RADIOLOGY

## 2021-05-03 NOTE — PROGRESS NOTES
SUBJECTIVE:  Chief Complaint   Patient presents with     Urgent Care     Musculoskeletal Problem     poss L hand broken. Pt dropped a 250 pound aerator machine on hand trying to load it in truck last night. tx; None     Echo Mcgovern is a 25 year old female presents with a chief complaint of left hand pain.  The injury occurred 1 day(s) ago.   The injury happened while at home. How: putting #250 lb aerator into back of truck and it slipped and landed on her hand immediate pain, immediate swelling, no deformity was noted by the patient.    Pain over distal 2nd metacarpal.  The patient complained of mild and moderate pain  and has not had decreased ROM.  Pain exacerbated by flexion/extension.  Relieved by rest.  She treated it initially with no therapy. This is the first time this type of injury has occurred to this patient.     Past Medical History:   Diagnosis Date     Abnormal Pap smear of cervix 02/17/2016    see problem list     H/O pre-eclampsia in prior pregnancy, currently pregnant      Current Outpatient Medications   Medication Sig Dispense Refill     desogestrel-ethinyl estradiol (APRI) 0.15-30 MG-MCG tablet Take 1 tablet by mouth daily 84 tablet 4     Iron-Vitamin C (IRON 100/C PO)        multivitamin w/minerals (MULTI-VITAMIN) tablet Take 1 tablet by mouth daily       Social History     Tobacco Use     Smoking status: Never Smoker     Smokeless tobacco: Never Used   Substance Use Topics     Alcohol use: No       ROS:  Review of systems negative except as stated above.    EXAM:   /81   Pulse 72   Temp 98.7  F (37.1  C) (Tympanic)   Wt 75.3 kg (166 lb)   SpO2 100%   Breastfeeding No   BMI 28.49 kg/m    Gen: healthy,alert,no distress  Extremity: hand has mild swelling noted over 2nd MCP joint. No deformity noted but is tender.  Tendon function intact and able to make full fist.  Skin intact. No pain in remainder of fingers or hand.   There is not compromise to the distal circulation.  Pulses  are +2 and CRT is brisk  GENERAL APPEARANCE: healthy, alert and no distress  EXTREMITIES: peripheral pulses normal  SKIN: no suspicious lesions or rashes  NEURO: Normal strength and tone, sensory exam grossly normal, mentation intact and speech normal    X-RAY was done.  No fracture noted per my read    assessment/plan:  (S69.92XA) Injury of left hand, initial encounter  (primary encounter diagnosis)  Comment:   Plan: XR Hand Left G/E 3 Views          No fracture noted and tendon function intact.  OTC med for pain and ice for swelling.  Activity as tolerated and to Follow-up with PCP as needed if sx fail to improve.

## 2021-07-07 ENCOUNTER — APPOINTMENT (OUTPATIENT)
Dept: CT IMAGING | Facility: CLINIC | Age: 26
End: 2021-07-07
Attending: EMERGENCY MEDICINE
Payer: COMMERCIAL

## 2021-07-07 ENCOUNTER — HOSPITAL ENCOUNTER (EMERGENCY)
Facility: CLINIC | Age: 26
Discharge: HOME | End: 2021-07-07
Attending: EMERGENCY MEDICINE | Admitting: EMERGENCY MEDICINE
Payer: COMMERCIAL

## 2021-07-07 ENCOUNTER — OFFICE VISIT (OUTPATIENT)
Dept: URGENT CARE | Facility: URGENT CARE | Age: 26
End: 2021-07-07
Payer: COMMERCIAL

## 2021-07-07 VITALS
TEMPERATURE: 97.6 F | SYSTOLIC BLOOD PRESSURE: 121 MMHG | HEART RATE: 76 BPM | RESPIRATION RATE: 16 BRPM | OXYGEN SATURATION: 100 % | DIASTOLIC BLOOD PRESSURE: 86 MMHG

## 2021-07-07 VITALS
SYSTOLIC BLOOD PRESSURE: 133 MMHG | DIASTOLIC BLOOD PRESSURE: 84 MMHG | RESPIRATION RATE: 20 BRPM | TEMPERATURE: 98 F | OXYGEN SATURATION: 98 % | HEART RATE: 78 BPM

## 2021-07-07 DIAGNOSIS — R10.31 RLQ ABDOMINAL PAIN: Primary | ICD-10-CM

## 2021-07-07 DIAGNOSIS — R10.9 RIGHT SIDED ABDOMINAL PAIN: Primary | ICD-10-CM

## 2021-07-07 LAB
ALBUMIN UR-MCNC: NEGATIVE MG/DL
ANION GAP SERPL CALCULATED.3IONS-SCNC: 4 MMOL/L (ref 3–14)
APPEARANCE UR: ABNORMAL
BACTERIA #/AREA URNS HPF: ABNORMAL /HPF
BASOPHILS # BLD AUTO: 0 10E9/L (ref 0–0.2)
BASOPHILS NFR BLD AUTO: 0.5 %
BILIRUB UR QL STRIP: NEGATIVE
BUN SERPL-MCNC: 18 MG/DL (ref 7–30)
CALCIUM SERPL-MCNC: 9.8 MG/DL (ref 8.5–10.1)
CHLORIDE SERPL-SCNC: 105 MMOL/L (ref 94–109)
CO2 SERPL-SCNC: 30 MMOL/L (ref 20–32)
COLOR UR AUTO: YELLOW
CREAT SERPL-MCNC: 1.1 MG/DL (ref 0.52–1.04)
DIFFERENTIAL METHOD BLD: NORMAL
EOSINOPHIL # BLD AUTO: 0.2 10E9/L (ref 0–0.7)
EOSINOPHIL NFR BLD AUTO: 2.9 %
ERYTHROCYTE [DISTWIDTH] IN BLOOD BY AUTOMATED COUNT: 12.1 % (ref 10–15)
GFR SERPL CREATININE-BSD FRML MDRD: 68 ML/MIN/{1.73_M2}
GLUCOSE SERPL-MCNC: 84 MG/DL (ref 70–99)
GLUCOSE UR STRIP-MCNC: NEGATIVE MG/DL
HCG UR QL: NEGATIVE
HCT VFR BLD AUTO: 40.1 % (ref 35–47)
HGB BLD-MCNC: 13.5 G/DL (ref 11.7–15.7)
HGB UR QL STRIP: ABNORMAL
KETONES UR STRIP-MCNC: NEGATIVE MG/DL
LEUKOCYTE ESTERASE UR QL STRIP: ABNORMAL
LYMPHOCYTES # BLD AUTO: 2 10E9/L (ref 0.8–5.3)
LYMPHOCYTES NFR BLD AUTO: 27 %
MCH RBC QN AUTO: 31.5 PG (ref 26.5–33)
MCHC RBC AUTO-ENTMCNC: 33.7 G/DL (ref 31.5–36.5)
MCV RBC AUTO: 94 FL (ref 78–100)
MONOCYTES # BLD AUTO: 0.5 10E9/L (ref 0–1.3)
MONOCYTES NFR BLD AUTO: 6.4 %
NEUTROPHILS # BLD AUTO: 4.6 10E9/L (ref 1.6–8.3)
NEUTROPHILS NFR BLD AUTO: 63.2 %
NITRATE UR QL: NEGATIVE
NON-SQ EPI CELLS #/AREA URNS LPF: ABNORMAL /LPF
PH UR STRIP: 6 PH (ref 5–7)
PLATELET # BLD AUTO: 228 10E9/L (ref 150–450)
POTASSIUM SERPL-SCNC: 4 MMOL/L (ref 3.4–5.3)
RBC # BLD AUTO: 4.29 10E12/L (ref 3.8–5.2)
RBC #/AREA URNS AUTO: ABNORMAL /HPF
SODIUM SERPL-SCNC: 139 MMOL/L (ref 133–144)
SOURCE: ABNORMAL
SP GR UR STRIP: 1.02 (ref 1–1.03)
UROBILINOGEN UR STRIP-ACNC: 0.2 EU/DL (ref 0.2–1)
WBC # BLD AUTO: 7.3 10E9/L (ref 4–11)
WBC #/AREA URNS AUTO: ABNORMAL /HPF

## 2021-07-07 PROCEDURE — 74177 CT ABD & PELVIS W/CONTRAST: CPT

## 2021-07-07 PROCEDURE — 85025 COMPLETE CBC W/AUTO DIFF WBC: CPT | Performed by: PHYSICIAN ASSISTANT

## 2021-07-07 PROCEDURE — 36415 COLL VENOUS BLD VENIPUNCTURE: CPT | Performed by: PHYSICIAN ASSISTANT

## 2021-07-07 PROCEDURE — 250N000009 HC RX 250: Performed by: EMERGENCY MEDICINE

## 2021-07-07 PROCEDURE — 99285 EMERGENCY DEPT VISIT HI MDM: CPT | Mod: 25

## 2021-07-07 PROCEDURE — 87086 URINE CULTURE/COLONY COUNT: CPT | Performed by: PHYSICIAN ASSISTANT

## 2021-07-07 PROCEDURE — 96361 HYDRATE IV INFUSION ADD-ON: CPT

## 2021-07-07 PROCEDURE — 81025 URINE PREGNANCY TEST: CPT | Performed by: PHYSICIAN ASSISTANT

## 2021-07-07 PROCEDURE — 81001 URINALYSIS AUTO W/SCOPE: CPT | Performed by: PHYSICIAN ASSISTANT

## 2021-07-07 PROCEDURE — 99214 OFFICE O/P EST MOD 30 MIN: CPT | Performed by: PHYSICIAN ASSISTANT

## 2021-07-07 PROCEDURE — 96374 THER/PROPH/DIAG INJ IV PUSH: CPT | Mod: 59

## 2021-07-07 PROCEDURE — 250N000011 HC RX IP 250 OP 636: Performed by: EMERGENCY MEDICINE

## 2021-07-07 PROCEDURE — 258N000003 HC RX IP 258 OP 636: Performed by: EMERGENCY MEDICINE

## 2021-07-07 PROCEDURE — 80048 BASIC METABOLIC PNL TOTAL CA: CPT | Performed by: PHYSICIAN ASSISTANT

## 2021-07-07 RX ORDER — KETOROLAC TROMETHAMINE 15 MG/ML
15 INJECTION, SOLUTION INTRAMUSCULAR; INTRAVENOUS ONCE
Status: COMPLETED | OUTPATIENT
Start: 2021-07-07 | End: 2021-07-07

## 2021-07-07 RX ORDER — IOPAMIDOL 755 MG/ML
500 INJECTION, SOLUTION INTRAVASCULAR ONCE
Status: COMPLETED | OUTPATIENT
Start: 2021-07-07 | End: 2021-07-07

## 2021-07-07 RX ADMIN — SODIUM CHLORIDE 1000 ML: 9 INJECTION, SOLUTION INTRAVENOUS at 19:06

## 2021-07-07 RX ADMIN — SODIUM CHLORIDE 61 ML: 9 INJECTION, SOLUTION INTRAVENOUS at 19:19

## 2021-07-07 RX ADMIN — KETOROLAC TROMETHAMINE 15 MG: 15 INJECTION, SOLUTION INTRAMUSCULAR; INTRAVENOUS at 19:09

## 2021-07-07 RX ADMIN — IOPAMIDOL 83 ML: 755 INJECTION, SOLUTION INTRAVENOUS at 19:19

## 2021-07-07 ASSESSMENT — ENCOUNTER SYMPTOMS
DIARRHEA: 0
VOMITING: 0
ABDOMINAL PAIN: 1

## 2021-07-07 NOTE — ED TRIAGE NOTES
Pt here for RLQ abd pain x4 days. States that it is more tender to the touch. Seen at UC today. Nausea when pain flares. A+Ox4, no acute signs of distress.

## 2021-07-07 NOTE — PROGRESS NOTES
Assessment & Plan     1. Right sided abdominal pain  25 year old female presents to the clinic for evaluation of abdominal pain. Started 2 days ago and has been progressive. She does feel some rebound tenderness, but does not have guarding.   Slight bump in creatinine, reports that she has been pushing fluids today. WBC is normal. UA with 5-10 WBC, but is otherwise without urinary symptoms.   Advised ER for evaluation given worsening abdominal pain.   She will drive herself.   - CBC with platelets and differential  - Basic metabolic panel  (Ca, Cl, CO2, Creat, Gluc, K, Na, BUN)  - *UA reflex to Microscopic and Culture ([place] [facility] (except [place] and [place])  - HCG Qual, Urine ([id number])  - Urine Microscopic  - Urine Culture Aerobic Bacterial      Melany Mcadams PA-C  [facility]    CHIEF COMPLAINT:   Chief Complaint   Patient presents with     Urgent Care     Abdominal Pain     R side stomach pain and back pain      Subjective     Gina is a 25 year old female who presents to clinic today for evaluation of right sided abdominal pain. Started in the afternoon of 7/25/2021. Pain first started out as being in her lower abdomen bilaterally, but now has moved to be periumbilical and radiating to her right lower quadrant. Pain is a dull ache rates as a 3/10, but will become very sharp, last about 30s and then lessen. When these episodes occur, she feels nausea. She denies having vomiting. Her last BM was this AM and notes that it was normal. Denies having diarrhea or constipation. She is able to pass gas. LMP was approximately one week ago and ended 2 days ago. She denies having fever, chills, chest pain, dysuria, hematuria, vaginal discharge, vaginal pain or new sexual partners. She uses OCP for birth control.   She does not have history of abdominal surgery.       Past Medical History:   Diagnosis Date     Abnormal Pap smear of cervix 02/17/2016    see problem list     H/O  pre-eclampsia in prior pregnancy, currently pregnant      Past Surgical History:   Procedure Laterality Date     HAND SURGERY  05/2015    right hand- fractured hand, had 2 screws placed     KNEE SURGERY  07/2011    Torn ACL, Left knee     Social History     Tobacco Use     Smoking status: Never Smoker     Smokeless tobacco: Never Used   Substance Use Topics     Alcohol use: No     Current Outpatient Medications   Medication     desogestrel-ethinyl estradiol (APRI) 0.15-30 MG-MCG tablet     Iron-Vitamin C (IRON 100/C PO)     multivitamin w/minerals (MULTI-VITAMIN) tablet     No current facility-administered medications for this visit.      No Known Allergies    10 point ROS of systems were all negative except for pertinent positives noted in my HPI.      Exam:   /84   Pulse 78   Temp 98  F (36.7  C) (Tympanic)   Resp 20   SpO2 98%   Constitutional: healthy, alert and no distress  Head: Normocephalic, atraumatic.  ENT: MMM  Neck: neck is supple, no cervical lymphadenopathy or nuchal rigidity  Cardiovascular: RRR  Respiratory: CTA bilaterally, no rhonchi or rales  Gastrointestinal: soft. Some discomfort in right lower quadrant with rebound. No guarding or rigidity.   Skin: no rashes  Neurologic: Speech clear, gait normal. Moves all extremities.    Results for orders placed or performed in visit on 07/07/21   CBC with platelets and differential     Status: None   Result Value Ref Range    WBC 7.3 4.0 - 11.0 10e9/L    RBC Count 4.29 3.8 - 5.2 10e12/L    Hemoglobin 13.5 11.7 - 15.7 g/dL    Hematocrit 40.1 35.0 - 47.0 %    MCV 94 78 - 100 fl    MCH 31.5 26.5 - 33.0 pg    MCHC 33.7 31.5 - 36.5 g/dL    RDW 12.1 10.0 - 15.0 %    Platelet Count 228 150 - 450 10e9/L    Diff Method Automated Method     % Neutrophils 63.2 %    % Lymphocytes 27.0 %    % Monocytes 6.4 %    % Eosinophils 2.9 %    % Basophils 0.5 %    Absolute Neutrophil 4.6 1.6 - 8.3 10e9/L    Absolute Lymphocytes 2.0 0.8 - 5.3 10e9/L    Absolute Monocytes  0.5 0.0 - 1.3 10e9/L    Absolute Eosinophils 0.2 0.0 - 0.7 10e9/L    Absolute Basophils 0.0 0.0 - 0.2 10e9/L   Basic metabolic panel  (Ca, Cl, CO2, Creat, Gluc, K, Na, BUN)     Status: Abnormal   Result Value Ref Range    Sodium 139 133 - 144 mmol/L    Potassium 4.0 3.4 - 5.3 mmol/L    Chloride 105 94 - 109 mmol/L    Carbon Dioxide 30 20 - 32 mmol/L    Anion Gap 4 3 - 14 mmol/L    Glucose 84 70 - 99 mg/dL    Urea Nitrogen 18 7 - 30 mg/dL    Creatinine 1.10 (H) 0.52 - 1.04 mg/dL    GFR Estimate 68 >60 mL/min/[1.73_m2]    GFR Estimate If Black 79 >60 mL/min/[1.73_m2]    Calcium 9.8 8.5 - 10.1 mg/dL   *UA reflex to Microscopic and Culture ([place] and [facility] (except [place] and [place])     Status: Abnormal    Specimen: Midstream Urine   Result Value Ref Range    Color Urine Yellow     Appearance Urine Cloudy     Glucose Urine Negative NEG^Negative mg/dL    Bilirubin Urine Negative NEG^Negative    Ketones Urine Negative NEG^Negative mg/dL    Specific Gravity Urine 1.025 1.003 - 1.035    Blood Urine Trace (A) NEG^Negative    pH Urine 6.0 5.0 - 7.0 pH    Protein Albumin Urine Negative NEG^Negative mg/dL    Urobilinogen Urine 0.2 0.2 - 1.0 EU/dL    Nitrite Urine Negative NEG^Negative    Leukocyte Esterase Urine Moderate (A) NEG^Negative    Source Midstream Urine    HCG Qual, Urine ([id number])     Status: None   Result Value Ref Range    HCG Qual Urine Negative NEG^Negative   Urine Microscopic     Status: Abnormal   Result Value Ref Range    WBC Urine 5-10 (A) OTO5^0 - 5 /HPF    RBC Urine O - 2 OTO2^O - 2 /HPF    Squamous Epithelial /LPF Urine Few FEW^Few /LPF    Bacteria Urine Few (A) NEG^Negative /HPF

## 2021-07-07 NOTE — ED PROVIDER NOTES
History   Chief Complaint:  Abdominal Pain       The history is provided by the patient.      Echo Mcgovern is a 25 year old female who presents with abdominal pain. The patient reports that she has right sided abdominal pain that started while she was at work 3 days ago. Her last period ended 2 days ago. She notes that her abdominal pain is 3/10 when its dull and a 7/10 when its sharp. The patient also states that she had similar pain when she had ovarian cysts. He denies any vomiting, diarrhea, vaginal bleeding, discharge, or kidney stones.       Review of Systems   Gastrointestinal: Positive for abdominal pain. Negative for diarrhea and vomiting.   Genitourinary: Negative for vaginal bleeding and vaginal discharge.   All other systems reviewed and are negative.    Allergies:  The patient has no known allergies.     Medications:  Desogestrel-ethinyl estradiol    Past Medical History:    Depression  Anxiety    Past Surgical History:    Hand Surgery  ACL Repair     Family History:    Bipolar Disorder, Sister    Social History:  PCP: Kaia Donahue  Presents to the ED alone    Physical Exam     Patient Vitals for the past 24 hrs:   BP Temp Temp src Pulse Resp SpO2   07/07/21 1930 121/86 -- -- 76 -- 100 %   07/07/21 1733 (!) 148/101 97.6  F (36.4  C) Temporal 80 16 99 %     Physical Exam  General: Alert, appears well-developed and well-nourished. Cooperative.     In mild distress  HEENT:  Head:  Atraumatic  Ears:  External ears are normal  Mouth/Throat:  Oropharynx is without erythema or exudate and mucous membranes are moist.   Eyes:   Conjunctivae normal and EOM are normal. No scleral icterus.  CV:  Normal rate, regular rhythm, normal heart sounds and radial pulses are 2+ and symmetric.  No murmur.  Resp:  Breath sounds are clear bilaterally    Non-labored, no retractions or accessory muscle use  GI:  Abdomen is soft, no distension, mild RLQ tenderness. No rebound or guarding.  No CVA tenderness  bilaterally  MS:  Normal range of motion. No edema.    Normal strength in all 4 extremities.     Back atraumatic.    No midline cervical, thoracic, or lumbar tenderness  Skin:  Warm and dry.  No rash or lesions noted.  Neuro: Alert. Normal strength.  GCS: 15  Psych:  Normal mood and affect.    Emergency Department Course     Imaging:  CT Abdomen/Pelvis w/ contrast:   IMPRESSION:   1.  No acute or suspicious findings in the abdomen or pelvis to explain the patient's symptoms.   2.  Normal appendix.  Reading per radiology    Emergency Department Course:  Reviewed:  I reviewed the patient's nursing notes, vitals, past medical records, Care Everywhere.     Assessments:  1836 I performed an assessment and examination of the patient as noted above.      1952 Findings and plan explained to the Patient. Patient discharged home with instructions regarding supportive care, medications, and reasons to return. The importance of close follow-up was reviewed.        Interventions:  1906 NS 1L IV Bolus  1909 Toradol 15 mg IV    Disposition:  The patient was discharged to home.       Impression & Plan   Medical Decision Making:  Echo Mcgovern is a 25 year old female who presents with right lower quadrant abdominal pain.  Patient had a broad a broad laboratory urinalysis work-up already performed at urgent care.  Reassuringly blood work grossly unremarkable.  Patient did have a negative pregnancy test and urinalysis was unremarkable including microscopic analysis.  She has no urinary complaints and very low concern for cystitis or pyelonephritis.  CT imaging obtained out of concern for potential appendicitis.  Reassuringly CT scan was negative for appendicitis and reassuringly no acute or suspicious findings within the abdomen or pelvis to explain her symptoms.  Unclear to exact etiology, although would continue with Tylenol and ibuprofen for pain control as needed.  Follow-up with primary care encouraged in the next 3 days for  recheck particularly if pain persist.  Return precautions understood and all questions answered for discharge.  Discharged home.    Covid-19  Echo Mcgovern was evaluated during a global COVID-19 pandemic, which necessitated consideration that the patient might be at risk for infection with the SARS-CoV-2 virus that causes COVID-19.   Applicable protocols for evaluation were followed during the patient's care.       Diagnosis:    ICD-10-CM    1. RLQ abdominal pain  R10.31        Scribe Disclosure:  EVAN, STELLA MOISE, am serving as a scribe at 6:38 PM on 7/7/2021 to document services personally performed by Matthew Brito MD based on my observations and the provider's statements to me.        Matthew Brito MD  07/07/21 [id number]

## 2021-07-09 LAB
BACTERIA SPEC CULT: NORMAL
Lab: NORMAL
SPECIMEN SOURCE: NORMAL

## 2021-10-10 ENCOUNTER — HEALTH MAINTENANCE LETTER (OUTPATIENT)
Age: 26
End: 2021-10-10

## 2021-12-07 DIAGNOSIS — Z30.011 ENCOUNTER FOR BCP (BIRTH CONTROL PILLS) INITIAL PRESCRIPTION: ICD-10-CM

## 2021-12-07 RX ORDER — DESOGESTREL AND ETHINYL ESTRADIOL 0.15-0.03
1 KIT ORAL DAILY
Qty: 84 TABLET | Refills: 0 | Status: SHIPPED | OUTPATIENT
Start: 2021-12-07 | End: 2022-03-08

## 2021-12-07 NOTE — TELEPHONE ENCOUNTER
carol      Last Written Prescription Date:  10/26/20  Last Fill Quantity: 84,   # refills: 4  Last Office Visit: 10/26/20  Future Office visit:

## 2021-12-07 NOTE — TELEPHONE ENCOUNTER
Medication is being filled for 1 time refill only due to:  Patient needs to be seen because it has been more than one year since last visit.     Prema THAKUR RN

## 2022-01-29 ENCOUNTER — HEALTH MAINTENANCE LETTER (OUTPATIENT)
Age: 27
End: 2022-01-29

## 2022-03-07 PROBLEM — Z00.00 ENCOUNTER FOR PREVENTIVE HEALTH EXAMINATION: Status: ACTIVE | Noted: 2022-03-07

## 2022-03-07 PROBLEM — Z02.89 HEALTH EXAMINATION OF DEFINED SUBPOPULATION: Status: ACTIVE | Noted: 2022-03-07

## 2022-03-07 ASSESSMENT — ANXIETY QUESTIONNAIRES
5. BEING SO RESTLESS THAT IT IS HARD TO SIT STILL: SEVERAL DAYS
4. TROUBLE RELAXING: SEVERAL DAYS
GAD7 TOTAL SCORE: 7
1. FEELING NERVOUS, ANXIOUS, OR ON EDGE: MORE THAN HALF THE DAYS
3. WORRYING TOO MUCH ABOUT DIFFERENT THINGS: SEVERAL DAYS
7. FEELING AFRAID AS IF SOMETHING AWFUL MIGHT HAPPEN: NOT AT ALL
GAD7 TOTAL SCORE: 7
7. FEELING AFRAID AS IF SOMETHING AWFUL MIGHT HAPPEN: NOT AT ALL
6. BECOMING EASILY ANNOYED OR IRRITABLE: SEVERAL DAYS
2. NOT BEING ABLE TO STOP OR CONTROL WORRYING: SEVERAL DAYS

## 2022-03-08 ENCOUNTER — OFFICE VISIT (OUTPATIENT)
Dept: OBGYN | Facility: CLINIC | Age: 27
End: 2022-03-08
Attending: ADVANCED PRACTICE MIDWIFE
Payer: COMMERCIAL

## 2022-03-08 VITALS
HEIGHT: 64 IN | DIASTOLIC BLOOD PRESSURE: 89 MMHG | WEIGHT: 172 LBS | BODY MASS INDEX: 29.37 KG/M2 | HEART RATE: 85 BPM | SYSTOLIC BLOOD PRESSURE: 141 MMHG

## 2022-03-08 DIAGNOSIS — Z83.3 FAMILY HISTORY OF DIABETES MELLITUS: ICD-10-CM

## 2022-03-08 DIAGNOSIS — R63.5 WEIGHT GAIN: Primary | ICD-10-CM

## 2022-03-08 DIAGNOSIS — Z30.011 ENCOUNTER FOR BCP (BIRTH CONTROL PILLS) INITIAL PRESCRIPTION: ICD-10-CM

## 2022-03-08 DIAGNOSIS — Z00.00 ENCOUNTER FOR PREVENTIVE HEALTH EXAMINATION: ICD-10-CM

## 2022-03-08 LAB
HBA1C MFR BLD: 4.7 % (ref 0–5.6)
T4 FREE SERPL-MCNC: 1.12 NG/DL (ref 0.76–1.46)
TSH SERPL DL<=0.005 MIU/L-ACNC: 1.67 MU/L (ref 0.4–4)

## 2022-03-08 PROCEDURE — 83036 HEMOGLOBIN GLYCOSYLATED A1C: CPT | Performed by: ADVANCED PRACTICE MIDWIFE

## 2022-03-08 PROCEDURE — G0145 SCR C/V CYTO,THINLAYER,RESCR: HCPCS | Performed by: ADVANCED PRACTICE MIDWIFE

## 2022-03-08 PROCEDURE — 99395 PREV VISIT EST AGE 18-39: CPT | Performed by: ADVANCED PRACTICE MIDWIFE

## 2022-03-08 PROCEDURE — 84439 ASSAY OF FREE THYROXINE: CPT | Performed by: ADVANCED PRACTICE MIDWIFE

## 2022-03-08 PROCEDURE — 84443 ASSAY THYROID STIM HORMONE: CPT | Performed by: ADVANCED PRACTICE MIDWIFE

## 2022-03-08 PROCEDURE — 36415 COLL VENOUS BLD VENIPUNCTURE: CPT | Performed by: ADVANCED PRACTICE MIDWIFE

## 2022-03-08 PROCEDURE — G0463 HOSPITAL OUTPT CLINIC VISIT: HCPCS | Mod: 25

## 2022-03-08 RX ORDER — DESOGESTREL AND ETHINYL ESTRADIOL 0.15-0.03
1 KIT ORAL DAILY
Qty: 84 TABLET | Refills: 4 | Status: SHIPPED | OUTPATIENT
Start: 2022-03-08 | End: 2023-01-19

## 2022-03-08 RX ORDER — OMEGA-3/DHA/EPA/FISH OIL 60 MG-90MG
CAPSULE ORAL
COMMUNITY
End: 2023-01-19

## 2022-03-08 RX ORDER — MULTIVIT-MIN/IRON/FOLIC ACID/K 18-600-40
2000 CAPSULE ORAL
COMMUNITY
End: 2023-01-19

## 2022-03-08 ASSESSMENT — ANXIETY QUESTIONNAIRES: GAD7 TOTAL SCORE: 7
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
## 2022-03-10 LAB
BKR LAB AP GYN ADEQUACY: NORMAL
BKR LAB AP GYN INTERPRETATION: NORMAL
BKR LAB AP HPV REFLEX: NORMAL
BKR LAB AP PREVIOUS ABNL DX: NORMAL
BKR LAB AP PREVIOUS ABNORMAL: NORMAL
PATH REPORT.COMMENTS IMP SPEC: NORMAL
PATH REPORT.COMMENTS IMP SPEC: NORMAL
PATH REPORT.RELEVANT HX SPEC: NORMAL

## 2022-09-18 ENCOUNTER — HEALTH MAINTENANCE LETTER (OUTPATIENT)
Age: 27
End: 2022-09-18

## 2022-12-30 ENCOUNTER — OFFICE VISIT (OUTPATIENT)
Dept: URGENT CARE | Facility: URGENT CARE | Age: 27
End: 2022-12-30
Payer: COMMERCIAL

## 2022-12-30 VITALS
HEART RATE: 103 BPM | TEMPERATURE: 99.5 F | SYSTOLIC BLOOD PRESSURE: 132 MMHG | DIASTOLIC BLOOD PRESSURE: 90 MMHG | OXYGEN SATURATION: 100 %

## 2022-12-30 DIAGNOSIS — R50.81 FEVER IN OTHER DISEASES: ICD-10-CM

## 2022-12-30 DIAGNOSIS — J10.1 INFLUENZA A: Primary | ICD-10-CM

## 2022-12-30 LAB
FLUAV AG SPEC QL IA: POSITIVE
FLUBV AG SPEC QL IA: NEGATIVE

## 2022-12-30 PROCEDURE — 99213 OFFICE O/P EST LOW 20 MIN: CPT | Performed by: STUDENT IN AN ORGANIZED HEALTH CARE EDUCATION/TRAINING PROGRAM

## 2022-12-30 PROCEDURE — 87804 INFLUENZA ASSAY W/OPTIC: CPT

## 2022-12-30 RX ORDER — OSELTAMIVIR PHOSPHATE 75 MG/1
75 CAPSULE ORAL 2 TIMES DAILY
Qty: 10 CAPSULE | Refills: 0 | Status: SHIPPED | OUTPATIENT
Start: 2022-12-30 | End: 2023-01-04

## 2022-12-30 ASSESSMENT — ENCOUNTER SYMPTOMS
FEVER: 1
COUGH: 1
RHINORRHEA: 1
CHILLS: 1

## 2022-12-30 NOTE — PROGRESS NOTES
ASSESSMENT & PLAN:   Diagnoses and all orders for this visit:  Influenza A  -     oseltamivir (TAMIFLU) 75 MG capsule; Take 1 capsule (75 mg) by mouth 2 times daily for 5 days  Fever in other diseases  -     Influenza A & B Antigen - Clinic Collect    Influenza A -start Tamiflu as patient is currently 4 weeks pregnant. Other supportive treatment including Tylenol, increase fluids, rest, humidifier, tea with honey.     Return in about 1 week (around 1/6/2023) for persistent symptoms, sooner if needed.    At the end of the encounter, I discussed results, diagnosis, medications. Discussed red flags for immediate return to clinic/ER, as well as indications for follow up if no improvement. Patient and/or caregiver understood and agreed to plan. Patient was stable for discharge.      Patient Instructions       Take Tamiflu as directed.    Take Tylenol or Ibuprofen as needed for fever relief.     Increase fluids and rest.    Influenza is typically considered contagious one day prior and 5-7 days after your symptoms begin. I recommend returning to work/school after you are fever free for 24 hours. Continue to practice good hand hygiene and cough coverage well after you are fever free.     Follow up if you develop fever that can not be controlled, difficulty breathing, or severe dehydration.    Influenza  Influenza ( the flu ) is an infection that affects your respiratory tract (the mouth, nose, and lungs, and the passages between them). Unlike a cold, the flu can make you very ill. And it can lead to pneumonia, a serious lung infection. For some people, especially older adults, young children, and people with certain chronic conditions, the flu can have serious complications and even be fatal.    How Does the Flu Spread?  The flu is caused by viruses. The viruses spread through the air in droplets when someone who has the flu coughs, sneezes, laughs, or talks. You can become infected when you inhale these viruses directly.  "You can also become infected when you touch a surface on which the droplets have landed and then transfer the germs to your eyes, nose, or mouth. Touching used tissues, or sharing utensils, drinking glasses, or a toothbrush with an infected person can expose you to flu viruses, too.    What Are the Symptoms of the Flu?  Flu symptoms tend to come on quickly and may last a few days to a few weeks. They include:    Fever usually higher than 101 F  (38.3 C) and chills    Sore throat and headache    Dry cough    Runny nose    Tiredness and weakness    Muscle aches    Factors That Can Make Flu Worse  For some people, the flu can be very serious. The risk of complications is greater for:    Children under age 5.    Adults 65 years of age and older.    People with a chronic illness, such as diabetes or heart, kidney, or lung disease.    People who live in a nursing home or long-term care facility.     How Is the Flu Treated?  Influenza usually improves after 7 days or so. In some cases, your health care provider may prescribe an antiviral medication. This may help you get well sooner. For the medication to help, you need to take it as soon as possible (ideally within 48 hours) after your symptoms start. If you develop pneumonia or other serious illness, hospital care may be needed.    Easing Flu Symptoms    Drink lots of fluids such as water, juice, and warm soup. A good rule is to drink enough so that you urinate your normal amount.    Get plenty of rest.    Tylenol/ibuprofen for fever    Call your provider if your fever rises over 101 F (38.3 C) or you become dizzy, lightheaded, or short of breath.        ------------------------------------------------------------------------  SUBJECTIVE  Patient presents with:  Flu Symptoms: Patient is a 27 yr old female who is currently 4 weeks pregnant, and presents with flu like symptoms. Symptoms started yesterday with a \"tickle\" in throat, body aches, cough, runny nose, " fever(100.5)    HPI  Echo Mcgovern is a(n) 27 year old female presenting to clinic today for flulike symptoms that began yesterday. Currently 4 weeks pregnant. Endorses fever, chills, rhinorrhea, cough. Exposed to daughter with influenza last week.     Review of Systems   Constitutional: Positive for chills and fever.   HENT: Positive for rhinorrhea.    Respiratory: Positive for cough.        Current Outpatient Medications   Medication Sig Dispense Refill     oseltamivir (TAMIFLU) 75 MG capsule Take 1 capsule (75 mg) by mouth 2 times daily for 5 days 10 capsule 0     Prenatal MV & Min w/FA-DHA ([facility] PRENATAL GUMMY PO)        desogestrel-ethinyl estradiol (APRI) 0.15-30 MG-MCG tablet Take 1 tablet by mouth daily (Patient not taking: Reported on 12/30/2022) 84 tablet 4     fish oil-omega-3 fatty acids 500 MG capsule  (Patient not taking: Reported on 12/30/2022)       Iron-Vitamin C (IRON 100/C PO)  (Patient not taking: Reported on 12/30/2022)       multivitamin w/minerals (THERA-VIT-M) tablet Take 1 tablet by mouth daily (Patient not taking: Reported on 12/30/2022)       Vitamin D, Cholecalciferol, 25 MCG (1000 UT) TABS Take 2,000 Units by mouth (Patient not taking: Reported on 12/30/2022)       Problem List:  2022-03: Health examination of defined subpopulation  2022-03: Encounter for preventive health examination  2021-03: Mild episode of recurrent major depressive disorder (H)  2021-03: Anxiety  2018-09: Elevated blood pressure reading without diagnosis of   hypertension  2018-09: Indication for care in labor or delivery  2018-09: Severe preeclampsia  2018-05: Supervision of high risk pregnancy, antepartum  2016-02: ASCUS of cervix with negative high risk HPV  NO ACTIVE PROBLEMS  H/O pre-eclampsia in prior pregnancy, currently pregnant    No Known Allergies      OBJECTIVE  Vitals:    12/30/22 1713   BP: (!) 132/90   BP Location: Right arm   Patient Position: Chair   Cuff Size: Adult Regular   Pulse: 103   Temp:  99.5  F (37.5  C)   TempSrc: Tympanic   SpO2: 100%     Physical Exam   GENERAL: healthy, alert, no acute distress.   HEAD: normocephalic, atraumatic.  EYE: PERRL. EOMs intact. No scleral injection bilaterally.   NOSE: external nose atraumatic without lesions.  OROPHARYNX: moist mucous membranes. Tonsils without erythema or exudate. Uvula midline.   NECK: nontender. No cervical adenopathy.   LUNGS: no increased work of breathing. Clear lung sounds bilaterally. No wheezing, rhonchi, or rales.   CV: regular rate and rhythm. No clicks, murmurs, or rubs.    Results for orders placed or performed in visit on 12/30/22   Influenza A & B Antigen - Clinic Collect     Status: Abnormal    Specimen: Nose; Swab   Result Value Ref Range    Influenza A antigen Positive (A) Negative    Influenza B antigen Negative Negative    Narrative    Test results must be correlated with clinical data. If necessary, results should be confirmed by a molecular assay or viral culture.

## 2022-12-31 NOTE — PATIENT INSTRUCTIONS
Take Tamiflu as directed.  Take Tylenol or Ibuprofen as needed for fever relief.   Increase fluids and rest.  Influenza is typically considered contagious one day prior and 5-7 days after your symptoms begin. I recommend returning to work/school after you are fever free for 24 hours. Continue to practice good hand hygiene and cough coverage well after you are fever free.   Follow up if you develop fever that can not be controlled, difficulty breathing, or severe dehydration.    Influenza  Influenza ( the flu ) is an infection that affects your respiratory tract (the mouth, nose, and lungs, and the passages between them). Unlike a cold, the flu can make you very ill. And it can lead to pneumonia, a serious lung infection. For some people, especially older adults, young children, and people with certain chronic conditions, the flu can have serious complications and even be fatal.    How Does the Flu Spread?  The flu is caused by viruses. The viruses spread through the air in droplets when someone who has the flu coughs, sneezes, laughs, or talks. You can become infected when you inhale these viruses directly. You can also become infected when you touch a surface on which the droplets have landed and then transfer the germs to your eyes, nose, or mouth. Touching used tissues, or sharing utensils, drinking glasses, or a toothbrush with an infected person can expose you to flu viruses, too.    What Are the Symptoms of the Flu?  Flu symptoms tend to come on quickly and may last a few days to a few weeks. They include:  Fever usually higher than 101 F  (38.3 C) and chills  Sore throat and headache  Dry cough  Runny nose  Tiredness and weakness  Muscle aches    Factors That Can Make Flu Worse  For some people, the flu can be very serious. The risk of complications is greater for:  Children under age 5.  Adults 65 years of age and older.  People with a chronic illness, such as diabetes or heart, kidney, or lung  disease.  People who live in a nursing home or long-term care facility.     How Is the Flu Treated?  Influenza usually improves after 7 days or so. In some cases, your health care provider may prescribe an antiviral medication. This may help you get well sooner. For the medication to help, you need to take it as soon as possible (ideally within 48 hours) after your symptoms start. If you develop pneumonia or other serious illness, hospital care may be needed.    Easing Flu Symptoms  Drink lots of fluids such as water, juice, and warm soup. A good rule is to drink enough so that you urinate your normal amount.  Get plenty of rest.  Tylenol/ibuprofen for fever  Call your provider if your fever rises over 101 F (38.3 C) or you become dizzy, lightheaded, or short of breath.

## 2023-01-19 ENCOUNTER — PRENATAL OFFICE VISIT (OUTPATIENT)
Dept: NURSING | Facility: CLINIC | Age: 28
End: 2023-01-19

## 2023-01-19 DIAGNOSIS — Z34.90 SUPERVISION OF NORMAL PREGNANCY: Primary | ICD-10-CM

## 2023-01-19 PROCEDURE — 99207 PR NO CHARGE NURSE ONLY: CPT
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
## 2023-01-28 ENCOUNTER — HOSPITAL ENCOUNTER (EMERGENCY)
Facility: CLINIC | Age: 28
Discharge: HOME | End: 2023-01-28
Attending: EMERGENCY MEDICINE | Admitting: EMERGENCY MEDICINE
Payer: COMMERCIAL

## 2023-01-28 ENCOUNTER — APPOINTMENT (OUTPATIENT)
Dept: ULTRASOUND IMAGING | Facility: CLINIC | Age: 28
End: 2023-01-28
Attending: EMERGENCY MEDICINE
Payer: COMMERCIAL

## 2023-01-28 VITALS
DIASTOLIC BLOOD PRESSURE: 89 MMHG | SYSTOLIC BLOOD PRESSURE: 148 MMHG | TEMPERATURE: 98.1 F | RESPIRATION RATE: 16 BRPM | HEART RATE: 90 BPM | OXYGEN SATURATION: 99 %

## 2023-01-28 DIAGNOSIS — R10.2 PELVIC PAIN IN FEMALE: ICD-10-CM

## 2023-01-28 LAB
ALBUMIN UR-MCNC: NEGATIVE MG/DL
ANION GAP SERPL CALCULATED.3IONS-SCNC: 13 MMOL/L (ref 7–15)
APPEARANCE UR: CLEAR
BACTERIA #/AREA URNS HPF: ABNORMAL /HPF
BASOPHILS # BLD AUTO: 0 10E3/UL (ref 0–0.2)
BASOPHILS NFR BLD AUTO: 1 %
BILIRUB UR QL STRIP: NEGATIVE
BUN SERPL-MCNC: 7.8 MG/DL (ref 6–20)
CALCIUM SERPL-MCNC: 9.1 MG/DL (ref 8.6–10)
CHLORIDE SERPL-SCNC: 100 MMOL/L (ref 98–107)
COLOR UR AUTO: ABNORMAL
CREAT SERPL-MCNC: 0.56 MG/DL (ref 0.51–0.95)
DEPRECATED HCO3 PLAS-SCNC: 21 MMOL/L (ref 22–29)
EOSINOPHIL # BLD AUTO: 0.2 10E3/UL (ref 0–0.7)
EOSINOPHIL NFR BLD AUTO: 2 %
ERYTHROCYTE [DISTWIDTH] IN BLOOD BY AUTOMATED COUNT: 12.9 % (ref 10–15)
GFR SERPL CREATININE-BSD FRML MDRD: >90 ML/MIN/1.73M2
GLUCOSE SERPL-MCNC: 81 MG/DL (ref 70–99)
GLUCOSE UR STRIP-MCNC: NEGATIVE MG/DL
HCG INTACT+B SERPL-ACNC: ABNORMAL MIU/ML
HCT VFR BLD AUTO: 40.2 % (ref 35–47)
HGB BLD-MCNC: 13.6 G/DL (ref 11.7–15.7)
HGB UR QL STRIP: NEGATIVE
IMM GRANULOCYTES # BLD: 0 10E3/UL
IMM GRANULOCYTES NFR BLD: 0 %
KETONES UR STRIP-MCNC: NEGATIVE MG/DL
LEUKOCYTE ESTERASE UR QL STRIP: NEGATIVE
LYMPHOCYTES # BLD AUTO: 1.3 10E3/UL (ref 0.8–5.3)
LYMPHOCYTES NFR BLD AUTO: 20 %
MCH RBC QN AUTO: 31.4 PG (ref 26.5–33)
MCHC RBC AUTO-ENTMCNC: 33.8 G/DL (ref 31.5–36.5)
MCV RBC AUTO: 93 FL (ref 78–100)
MONOCYTES # BLD AUTO: 0.4 10E3/UL (ref 0–1.3)
MONOCYTES NFR BLD AUTO: 5 %
NEUTROPHILS # BLD AUTO: 4.8 10E3/UL (ref 1.6–8.3)
NEUTROPHILS NFR BLD AUTO: 72 %
NITRATE UR QL: NEGATIVE
NRBC # BLD AUTO: 0 10E3/UL
NRBC BLD AUTO-RTO: 0 /100
PH UR STRIP: 7.5 [PH] (ref 5–7)
PLATELET # BLD AUTO: 193 10E3/UL (ref 150–450)
POTASSIUM SERPL-SCNC: 3.9 MMOL/L (ref 3.4–5.3)
RBC # BLD AUTO: 4.33 10E6/UL (ref 3.8–5.2)
RBC URINE: <1 /HPF
SODIUM SERPL-SCNC: 134 MMOL/L (ref 136–145)
SP GR UR STRIP: 1.01 (ref 1–1.03)
SQUAMOUS EPITHELIAL: 6 /HPF
UROBILINOGEN UR STRIP-MCNC: NORMAL MG/DL
WBC # BLD AUTO: 6.7 10E3/UL (ref 4–11)
WBC URINE: 1 /HPF

## 2023-01-28 PROCEDURE — 99284 EMERGENCY DEPT VISIT MOD MDM: CPT | Mod: 25

## 2023-01-28 PROCEDURE — 76801 OB US < 14 WKS SINGLE FETUS: CPT

## 2023-01-28 PROCEDURE — 80048 BASIC METABOLIC PNL TOTAL CA: CPT | Performed by: EMERGENCY MEDICINE

## 2023-01-28 PROCEDURE — 81001 URINALYSIS AUTO W/SCOPE: CPT | Performed by: EMERGENCY MEDICINE

## 2023-01-28 PROCEDURE — 84702 CHORIONIC GONADOTROPIN TEST: CPT | Performed by: EMERGENCY MEDICINE

## 2023-01-28 PROCEDURE — 85025 COMPLETE CBC W/AUTO DIFF WBC: CPT | Performed by: EMERGENCY MEDICINE

## 2023-01-28 PROCEDURE — 36415 COLL VENOUS BLD VENIPUNCTURE: CPT | Performed by: EMERGENCY MEDICINE

## 2023-01-28 RX ORDER — ACETAMINOPHEN 325 MG/1
650 TABLET ORAL ONCE
Status: COMPLETED | OUTPATIENT
Start: 2023-01-28 | End: 2023-01-28

## 2023-01-28 ASSESSMENT — ENCOUNTER SYMPTOMS
NAUSEA: 1
DIAPHORESIS: 0
CHILLS: 0
FEVER: 0

## 2023-01-28 ASSESSMENT — ACTIVITIES OF DAILY LIVING (ADL): ADLS_ACUITY_SCORE: 33

## 2023-01-28 NOTE — ED PROVIDER NOTES
History     Chief Complaint:  Pelvic Pain       HPI   Echo Mcgovern is a 27 year old  female with a history of round ligament pain who presents with pelvic pain (B/L). She notes hx of round ligament pain with prior 2 pregnancies. She notes this episode seemed more acute and severe last night. She has not tried any meds to help this. No vaginal bleeding.    The patient has not taken anything yet for pain and declines Tylenol. She is agreeable to a warm compress.    Independent Historian:    Spouse/Partner    Review of External Notes: 3/8/2022 Midwife visit (annual exam)    ROS:  Review of Systems   Constitutional: Negative for chills, diaphoresis and fever.   Gastrointestinal: Positive for nausea (morning sickness).   Genitourinary: Positive for pelvic pain. Negative for vaginal bleeding and vaginal discharge.       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 Streptococcus      H/O pre-eclampsia in prior pregnancy, currently pregnant      Preeclampsia      Varicella      Wounds and injuries        Past Surgical History:    Past Surgical History:   Procedure Laterality Date     HAND SURGERY  2015    right hand- fractured hand, had 2 screws placed     KNEE SURGERY  2011    Torn ACL, Left knee     ORTHOPEDIC SURGERY      [place]     TOOTH EXTRACTION          Family History:    family history includes Bipolar Disorder in her sister; Cerebrovascular Disease in her maternal grandfather; Diabetes in her paternal grandfather and paternal grandmother; Diabetes Type 1 in her maternal grandmother; Family History Negative in her mother; No Known Problems in her brother and sister.    Social History:   reports that she has never smoked. She has never used smokeless tobacco. She reports that she does not drink alcohol  and does not use drugs.  PCP: Kaia Donahue     Physical Exam     Patient Vitals for the past 24 hrs:   BP Temp Pulse Resp SpO2   01/28/23 0729 (!) 148/89 98.1  F (36.7  C) 90 16 99 %        Physical Exam  Constitutional: Vital signs reviewed as above.   HENT:    Head: No external signs of trauma noted.   Eyes: Conjunctivae are normal. Pupils are equal, round, and reactive to light.   Cardiovascular:    Normal rate, regular rhythm and normal heart sounds.     Exam reveals no friction rub.     No murmur heard.  Pulmonary/Chest:    Effort normal and breath sounds normal.    No respiratory distress.    There are no wheezes.    There are no rales.   Gastrointestinal:    Soft.    Bowel sounds normal.    There is no distension.    There is mild B/L (L>R) lower abdominal/pelvic.    There is no rebound or guarding.   Musculoskeletal:    Normal range of motion.    Normal Tone  Neurological: Patient is alert and oriented to person, place, and time.   Skin: Skin is warm and dry. Patient is not diaphoretic  Psychiatric: The patient appears calm      Emergency Department Course     Imaging:  US OB < 14 Weeks Single   Final Result   IMPRESSION:       1.  Single living intrauterine gestation at 8 weeks 2 days, EDC 09/07/2023.      2.  Small subchorionic hemorrhage measuring up to 1.4 cm.         Report per radiology    Laboratory:  Labs Ordered and Resulted from Time of ED Arrival to Time of ED Departure   ROUTINE UA WITH MICROSCOPIC REFLEX TO CULTURE - Abnormal       Result Value    Color Urine Straw      Appearance Urine Clear      Glucose Urine Negative      Bilirubin Urine Negative      Ketones Urine Negative      Specific Gravity Urine 1.007      Blood Urine Negative      pH Urine 7.5 (*)     Protein Albumin Urine Negative      Urobilinogen Urine Normal      Nitrite Urine Negative      Leukocyte Esterase Urine Negative      Bacteria Urine Few (*)     RBC Urine <1      WBC Urine 1      Squamous Epithelials Urine 6 (*)    BASIC  METABOLIC PANEL - Abnormal    Sodium 134 (*)     Potassium 3.9      Chloride 100      Carbon Dioxide (CO2) 21 (*)     Anion Gap 13      Urea Nitrogen 7.8      Creatinine 0.56      Calcium 9.1      Glucose 81      GFR Estimate >90     HCG QUANTITATIVE PREGNANCY - Abnormal    hCG Quantitative 88,349 (*)    CBC WITH PLATELETS AND DIFFERENTIAL    WBC Count 6.7      RBC Count 4.33      Hemoglobin 13.6      Hematocrit 40.2      MCV 93      MCH 31.4      MCHC 33.8      RDW 12.9      Platelet Count 193      % Neutrophils 72      % Lymphocytes 20      % Monocytes 5      % Eosinophils 2      % Basophils 1      % Immature Granulocytes 0      NRBCs per 100 WBC 0      Absolute Neutrophils 4.8      Absolute Lymphocytes 1.3      Absolute Monocytes 0.4      Absolute Eosinophils 0.2      Absolute Basophils 0.0      Absolute Immature Granulocytes 0.0      Absolute NRBCs 0.0          Procedures       Emergency Department Course & Assessments:             Interventions:  Medications   acetaminophen (TYLENOL) tablet 650 mg (650 mg Oral Not Given 1/28/23 0839)        Independent Interpretation (X-rays, CTs, rhythm strip):    US Pelvis: SLIUP,      Consultations/Discussion of Management or Tests:     ED Course as of 01/28/23 1250   Sat Jan 28, 2023   0857 Dr. Smith' evaluation.   0923 US OB < 14 Weeks Single  SLIUP,    0941 Rechecked and updated.       Social Determinants of Health affecting care:  None    Disposition:  The patient was discharged to home.     Impression & Plan    CMS Diagnoses: None    Medical Decision Making:  This 27-year-old female patient presents to the ED due to pelvic pain.  Please see the HPI and exam for specifics.  The patient remained well in the ED.  A broad differential was considered.  Fortunately, the patient's lab work is reassuring.  Her blood type is a positive on record review.  Ultrasound shows a single live intrauterine pregnancy with fetal heart rate of 163 bpm.  The patient declined  oral Tylenol in the emergency department.  She has an OB appointment scheduled for next Thursday and is comfortable following up there.  She can certainly return with new or worsening symptoms.    Critical Care time:  was 0 minutes for this patient excluding procedures.    Diagnosis:    ICD-10-CM    1. Pelvic pain in female  R10.2            Discharge Medications:  Discharge Medication List as of 1/28/2023  9:58 AM               1/28/2023   Norman Smith DO Burns, Bradley Joseph, DO  01/28/23 1251

## 2023-01-28 NOTE — DISCHARGE INSTRUCTIONS
What do you do next:   Continue your home prenatal vitamins  Tylenol can be used in pregnancy if your discomfort is bad enough.  Follow the directions on the bottle and do not take more than is recommended.  Follow up as indicated below    When do you return: If you have uncontrollable pain, intractable vomiting, lightheadedness/fainting, vaginal bleeding or you soak a pad edge to edge inside of an hour, or any other symptoms that concern you, please return to the ED for reevaluation.    Thank you for allowing us to care for you today.

## 2023-01-28 NOTE — ED TRIAGE NOTES
Patient presents to the ED reporting pelvic pain. States began yesterday and has been constant. 8 weeks pregnant. Denies vaginal bleeding.

## 2023-02-01 LAB
ABO/RH(D): NORMAL
ANTIBODY SCREEN: NEGATIVE
SPECIMEN EXPIRATION DATE: NORMAL

## 2023-02-02 ENCOUNTER — ANCILLARY PROCEDURE (OUTPATIENT)
Dept: ULTRASOUND IMAGING | Facility: CLINIC | Age: 28
End: 2023-02-02
Payer: COMMERCIAL

## 2023-02-02 ENCOUNTER — LAB (OUTPATIENT)
Dept: LAB | Facility: CLINIC | Age: 28
End: 2023-02-02
Payer: COMMERCIAL

## 2023-02-02 DIAGNOSIS — Z34.90 SUPERVISION OF NORMAL PREGNANCY: ICD-10-CM

## 2023-02-02 LAB
ALT SERPL W P-5'-P-CCNC: 19 U/L (ref 10–35)
AST SERPL W P-5'-P-CCNC: 22 U/L (ref 10–35)
BUN SERPL-MCNC: 9.8 MG/DL (ref 6–20)
CREAT SERPL-MCNC: 0.61 MG/DL (ref 0.51–0.95)
ERYTHROCYTE [DISTWIDTH] IN BLOOD BY AUTOMATED COUNT: 12.9 % (ref 10–15)
GFR SERPL CREATININE-BSD FRML MDRD: >90 ML/MIN/1.73M2
HCT VFR BLD AUTO: 39.3 % (ref 35–47)
HGB BLD-MCNC: 13.5 G/DL (ref 11.7–15.7)
MCH RBC QN AUTO: 31.3 PG (ref 26.5–33)
MCHC RBC AUTO-ENTMCNC: 34.4 G/DL (ref 31.5–36.5)
MCV RBC AUTO: 91 FL (ref 78–100)
PLATELET # BLD AUTO: 206 10E3/UL (ref 150–450)
RBC # BLD AUTO: 4.32 10E6/UL (ref 3.8–5.2)
URATE SERPL-MCNC: 3.5 MG/DL (ref 2.4–5.7)
WBC # BLD AUTO: 6.3 10E3/UL (ref 4–11)

## 2023-02-02 PROCEDURE — 84450 TRANSFERASE (AST) (SGOT): CPT

## 2023-02-02 PROCEDURE — 86780 TREPONEMA PALLIDUM: CPT

## 2023-02-02 PROCEDURE — 86850 RBC ANTIBODY SCREEN: CPT

## 2023-02-02 PROCEDURE — 87086 URINE CULTURE/COLONY COUNT: CPT

## 2023-02-02 PROCEDURE — 84520 ASSAY OF UREA NITROGEN: CPT

## 2023-02-02 PROCEDURE — 85027 COMPLETE CBC AUTOMATED: CPT

## 2023-02-02 PROCEDURE — 84460 ALANINE AMINO (ALT) (SGPT): CPT

## 2023-02-02 PROCEDURE — 76801 OB US < 14 WKS SINGLE FETUS: CPT | Performed by: OBSTETRICS & GYNECOLOGY

## 2023-02-02 PROCEDURE — 86901 BLOOD TYPING SEROLOGIC RH(D): CPT

## 2023-02-02 PROCEDURE — 87340 HEPATITIS B SURFACE AG IA: CPT

## 2023-02-02 PROCEDURE — 87389 HIV-1 AG W/HIV-1&-2 AB AG IA: CPT

## 2023-02-02 PROCEDURE — 36415 COLL VENOUS BLD VENIPUNCTURE: CPT

## 2023-02-02 PROCEDURE — 84550 ASSAY OF BLOOD/URIC ACID: CPT

## 2023-02-02 PROCEDURE — 86803 HEPATITIS C AB TEST: CPT

## 2023-02-02 PROCEDURE — 86762 RUBELLA ANTIBODY: CPT

## 2023-02-02 PROCEDURE — 82565 ASSAY OF CREATININE: CPT

## 2023-02-02 PROCEDURE — 86900 BLOOD TYPING SEROLOGIC ABO: CPT

## 2023-02-03 LAB
HBV SURFACE AG SERPL QL IA: NONREACTIVE
HCV AB SERPL QL IA: NONREACTIVE
HIV 1+2 AB+HIV1 P24 AG SERPL QL IA: NONREACTIVE
RUBV IGG SERPL QL IA: 3.52 INDEX
RUBV IGG SERPL QL IA: POSITIVE
T PALLIDUM AB SER QL: NONREACTIVE

## 2023-02-04 LAB — BACTERIA UR CULT: NORMAL

## 2023-02-13 ENCOUNTER — PRENATAL OFFICE VISIT (OUTPATIENT)
Dept: OBGYN | Facility: CLINIC | Age: 28
End: 2023-02-13
Payer: COMMERCIAL

## 2023-02-13 VITALS — WEIGHT: 176 LBS | DIASTOLIC BLOOD PRESSURE: 72 MMHG | SYSTOLIC BLOOD PRESSURE: 126 MMHG | BODY MASS INDEX: 30.21 KG/M2

## 2023-02-13 DIAGNOSIS — O09.899 SUPERVISION OF OTHER HIGH RISK PREGNANCY, ANTEPARTUM: Primary | ICD-10-CM

## 2023-02-13 PROCEDURE — 99207 PR FIRST OB VISIT: CPT | Performed by: OBSTETRICS & GYNECOLOGY

## 2023-02-13 PROCEDURE — 87591 N.GONORRHOEAE DNA AMP PROB: CPT | Performed by: OBSTETRICS & GYNECOLOGY

## 2023-02-13 PROCEDURE — 36415 COLL VENOUS BLD VENIPUNCTURE: CPT | Performed by: OBSTETRICS & GYNECOLOGY

## 2023-02-13 PROCEDURE — 87491 CHLMYD TRACH DNA AMP PROBE: CPT | Performed by: OBSTETRICS & GYNECOLOGY

## 2023-02-13 ASSESSMENT — PATIENT HEALTH QUESTIONNAIRE - PHQ9: SUM OF ALL RESPONSES TO PHQ QUESTIONS 1-9: 0

## 2023-02-13 NOTE — PATIENT INSTRUCTIONS
You can reach your [facility] Care Team any time of the day by calling [phone]. This number will put you in touch with the 24 hour nurse line if the clinic is closed.    To contact your OB/GYN Station Coordinator/Surgery Scheduler please call [phone]. This is a direct number for your care team between 8 a.m. and 4 p.m. Monday through Friday.    [facility] is open for your convenience:  Monday through Friday 8 a.m. to 6 p.m.  Closed weekends and all major holidays.

## 2023-02-13 NOTE — NURSING NOTE
"Chief Complaint   Patient presents with     Prenatal Care     10        Initial /72   Wt 79.8 kg (176 lb)   LMP 2022 (Exact Date)   BMI 30.21 kg/m   Estimated body mass index is 30.21 kg/m  as calculated from the following:    Height as of 3/8/22: 1.626 m (5' 4\").    Weight as of this encounter: 79.8 kg (176 lb).  BP completed using cuff size: regular    Questioned patient about current smoking habits.  Pt. has never smoked.          The following HM Due: NONE      The following patient reported/Care Every where data was sent to:  P ABSTRACT QUALITY INITIATIVES [72613]        Yandy Bermudez, [facility]                 "

## 2023-02-14 LAB
C TRACH DNA SPEC QL NAA+PROBE: NEGATIVE
N GONORRHOEA DNA SPEC QL NAA+PROBE: NEGATIVE

## 2023-02-17 PROBLEM — O09.899 SUPERVISION OF OTHER HIGH RISK PREGNANCY, ANTEPARTUM: Status: ACTIVE | Noted: 2018-05-22

## 2023-02-17 NOTE — PROGRESS NOTES
Echo Mcgovern is a 27 year old white female  Estimated Date of Delivery: Sep 7, 2023 confirmed by early first trimester ultrasound blood group A Rh+ antibody screen negative rubella status immune with a past history of preeclampsia in her 2 previous pregnancies  who presents to the clinic for an new ob visit.     testing including the NIPS screen, the  1st trimester screen, the quad test, the AFP test, the modified sequential test, cystic fibrosis screening and Ultrasound and the time frames for each of these were reviewed.  The patient would like to have the N IPS screen done today    She has not had bleeding since her LMP.   She has had mild nausea. Weigh loss has not occurred.   This was a planned pregnancy.   FOB is involved, same as her previous pregnancies  [place]  OTHER CONCERNS: As above  INFECTION HISTORY  HIV: no  Hepatitis B: no  Hepatitis C: no  Syphilis:  no  Tuberculosis: no   PPD- no  Herpes self: no  Herpes partner:  no  Chlamydia:  no  Gonorrhea:  no  HPV: no  BV:  no  Trichomonis:  no  Chicken Pox:  YES  ====================================================  PERSONAL/SOCIAL HISTORY  Lives lives with their spouse.  Employment: Full time.  Her job involves moderate activity .  Additional items: None  =====================================================   REVIEW OF SYSTEMS  CONSTITUTIONAL: NEGATIVE for fever, chills  EYES: NEGATIVE for vision changes   RESP: NEGATIVE for significant cough or SOB  CV: NEGATIVE for chest pain, palpitations   GI: NEGATIVE for nausea, abdominal pain, heartburn, or change in bowel habits  : NEGATIVE for frequency, dysuria, or hematuria  MUSCULOSKELETAL: NEGATIVE for significant arthralgias or myalgia  NEURO: NEGATIVE for weakness, dizziness or paresthesias or headache  ====================================================  PHYSICAL EXAM:  /72   Wt 79.8 kg (176 lb)   LMP 2022 (Exact Date)   BMI 30.21 kg/m    BMI- Body mass  index is 30.21 kg/m .,     RECOMMENDED WEIGHT GAIN: 25-35 lbs.  GENERAL:  Pleasant pregnant female, alert, well groomed.  SKIN:  Warm and dry, without lesions or rashes  HEAD: Symmetrical features.  EYES:  PERRLA,   MOUTH:  Buccal mucosa pink, moist without lesions.    NECK:  Thyroid without enlargement and nodules.  Lymph nodes not palpable.   LUNGS:  Clear to auscultation.  BREAST:  Symmetrical.  No dominant, fixed or suspicious masses are noted.  No skin or nipple changes or axillary nodes.  Self exam is taught and encouraged.  Nipples everted.      HEART:  RRR without murmur.  ABDOMEN: Soft without masses , tenderness or organomegaly.  No CVA tenderness. No scars noted..  with a Doptone  MUSCULOSKELETAL:  Full range of motion  EXTREMITIES:  No edema. No significant varicosities.   GENITALIA:  BUS WNL, no lesions noted   VAGINA:  Pink, normal rugae and discharge normal and physiologic,   CERVIX:  smooth, without discharge or CMT and parous os,   firm/ closed 2 cm long.  UTERUS: Anteverted, nontender 11 weeks in size.  ADNEXA: Without masses or tenderness  PELVIS:  Arch; wide . Sacrum; deep. Spines;blunt.  Side walls; straight. Type; gynecoid  PELVIS:   Adequate, Pelvis proven to 6 pounds 4 ounces.  RECTAL:  Normal appearance.  Digital exam deferred.  WET PREP:Not done  gonorrhea  =========================================  ASSESSMENT:  (Z34.90) Supervision of normal pregnancy  (primary encounter diagnosis)  Comment: Doing well no signs or concerns.   testing will be drawn  Plan: [facility] Non-Invasive Prenatal Screening,         Process and hold DNA, NEISSERIA GONORRHOEA PCR,        CHLAMYDIA TRACHOMATIS PCR        Return in 1 month or as needed.  The patient will be started on baby aspirin 81 mg daily to decrease the risk of preeclampsia.  Signs and symptoms of concern discussed the patient will call    PREGNANCY AT RISK? yes and no  ==========================================  PLAN:  Instructed on  use of triage nurse line and contacting the on call MD after hours for an urgent need such as fever, vagina bleeding, bladder or vaginal infection, rupture of membranes,  or term labor.      Instructed on best evidence for: weight gain for her BMI for pregnancy; healthy diet and foods to avoid; exercise and activity during pregnancy;avoiding exposure to toxoplasmosis; and maintenance of a generally healthy lifestyle.   Discussed the harms, benefits, side effects and alternative therapies for current prescribed and OTC medications.

## 2023-02-22 LAB — SCANNED LAB RESULT: NORMAL

## 2023-03-13 ENCOUNTER — PRENATAL OFFICE VISIT (OUTPATIENT)
Dept: OBGYN | Facility: CLINIC | Age: 28
End: 2023-03-13
Payer: COMMERCIAL

## 2023-03-13 VITALS — SYSTOLIC BLOOD PRESSURE: 128 MMHG | DIASTOLIC BLOOD PRESSURE: 72 MMHG | WEIGHT: 179 LBS | BODY MASS INDEX: 30.73 KG/M2

## 2023-03-13 DIAGNOSIS — O09.899 SUPERVISION OF OTHER HIGH RISK PREGNANCY, ANTEPARTUM: Primary | ICD-10-CM

## 2023-03-13 PROCEDURE — 99207 PR PRENATAL VISIT: CPT | Performed by: OBSTETRICS & GYNECOLOGY

## 2023-03-13 NOTE — PATIENT INSTRUCTIONS
You can reach your [facility] Care Team any time of the day by calling [phone]. This number will put you in touch with the 24 hour nurse line if the clinic is closed.    To contact your OB/GYN Station Coordinator/Surgery Scheduler please call [phone]. This is a direct number for your care team between 8 a.m. and 4 p.m. Monday through Friday.    [facility] is open for your convenience:  Monday through Friday 8 a.m. to 6 p.m.  Closed weekends and all major holidays.

## 2023-03-14 NOTE — PROGRESS NOTES
Echo Mcgovern is a 27 year old female, 14w5d, Estimated Date of Delivery: Sep 7, 2023 who presents for prenatal care.    The patient is doing well without concerns.  I reviewed the normal NIPS screen with the patient and her .  I placed an order for an 18 to 20-week OB ultrasound    A/P: Intrauterine pregnancy at 14-5/7 weeks gestation doing well without concerns.  Signs and symptoms of concern discussed the patient will call

## 2023-04-21 ENCOUNTER — ANCILLARY PROCEDURE (OUTPATIENT)
Dept: ULTRASOUND IMAGING | Facility: CLINIC | Age: 28
End: 2023-04-21
Attending: OBSTETRICS & GYNECOLOGY
Payer: COMMERCIAL

## 2023-04-21 DIAGNOSIS — O09.899 SUPERVISION OF OTHER HIGH RISK PREGNANCY, ANTEPARTUM: ICD-10-CM

## 2023-04-21 PROCEDURE — 76805 OB US >/= 14 WKS SNGL FETUS: CPT | Performed by: OBSTETRICS & GYNECOLOGY

## 2023-04-24 ENCOUNTER — PRENATAL OFFICE VISIT (OUTPATIENT)
Dept: OBGYN | Facility: CLINIC | Age: 28
End: 2023-04-24
Payer: COMMERCIAL

## 2023-04-24 VITALS — BODY MASS INDEX: 31.24 KG/M2 | DIASTOLIC BLOOD PRESSURE: 76 MMHG | WEIGHT: 182 LBS | SYSTOLIC BLOOD PRESSURE: 124 MMHG

## 2023-04-24 DIAGNOSIS — O09.899 SUPERVISION OF OTHER HIGH RISK PREGNANCY, ANTEPARTUM: Primary | ICD-10-CM

## 2023-04-24 PROCEDURE — 99207 PR PRENATAL VISIT: CPT | Performed by: OBSTETRICS & GYNECOLOGY

## 2023-04-24 NOTE — PATIENT INSTRUCTIONS
You can reach your [facility] Care Team any time of the day by calling [phone]. This number will put you in touch with the 24 hour nurse line if the clinic is closed.    To contact your OB/GYN Station Coordinator/Surgery Scheduler please call [phone]. This is a direct number for your care team between 8 a.m. and 4 p.m. Monday through Friday.    [facility] is open for your convenience:  Monday through Friday 8 a.m. to 6 p.m.  Closed weekends and all major holidays.

## 2023-04-24 NOTE — PROGRESS NOTES
Echo Mcgovern is a 27 year old female, 20w4d, Estimated Date of Delivery: Sep 7, 2023 who presents for prenatal care.    Good fetal movement.  Patient doing well without concerns.  The results of her recent ultrasound with her today    A/P:    Pregnancy at 20-4/7 weeks gestation doing well without concerns.  Signs and symptoms of concern discussed the patient will call.  She is taking a baby aspirin in light of her past history of preeclampsia

## 2023-05-06 ENCOUNTER — HEALTH MAINTENANCE LETTER (OUTPATIENT)
Age: 28
End: 2023-05-06

## 2023-05-25 ENCOUNTER — PRENATAL OFFICE VISIT (OUTPATIENT)
Dept: OBGYN | Facility: CLINIC | Age: 28
End: 2023-05-25
Payer: COMMERCIAL

## 2023-05-25 VITALS — SYSTOLIC BLOOD PRESSURE: 112 MMHG | WEIGHT: 188 LBS | DIASTOLIC BLOOD PRESSURE: 76 MMHG | BODY MASS INDEX: 32.27 KG/M2

## 2023-05-25 DIAGNOSIS — O09.899 SUPERVISION OF OTHER HIGH RISK PREGNANCY, ANTEPARTUM: Primary | ICD-10-CM

## 2023-05-25 PROCEDURE — 99207 PR PRENATAL VISIT: CPT | Performed by: OBSTETRICS & GYNECOLOGY

## 2023-05-25 NOTE — PATIENT INSTRUCTIONS
You can reach your [facility] Care Team any time of the day by calling [phone]. This number will put you in touch with the 24 hour nurse line if the clinic is closed.    To contact your OB/GYN Station Coordinator/Surgery Scheduler please call [phone]. This is a direct number for your care team between 8 a.m. and 4 p.m. Monday through Friday.    [facility] is open for your convenience:  Monday through Friday 8 a.m. to 6 p.m.  Closed weekends and all major holidays.

## 2023-05-25 NOTE — PROGRESS NOTES
Echo Mcgovern is a 27 year old female, 25w0d, Estimated Date of Delivery: Sep 7, 2023 who presents for prenatal care.  Good fetal movement.  Patient denies any signs or symptoms to suggest preeclampsia or other concerns in pregnancy    A/P: Intrauterine pregnancy at 25 weeks gestation doing well.  Past history of preeclampsia on ASA 81 mg daily.  Signs and symptoms of concern discussed the patient will call.  We will see her back in 3 weeks for a blood sugar screen and further prenatal care

## 2023-05-25 NOTE — NURSING NOTE
"Chief Complaint   Patient presents with     Prenatal Care     25w       Initial /76   Wt 85.3 kg (188 lb)   LMP 2022 (Exact Date)   BMI 32.27 kg/m   Estimated body mass index is 32.27 kg/m  as calculated from the following:    Height as of 3/8/22: 1.626 m (5' 4\").    Weight as of this encounter: 85.3 kg (188 lb).  BP completed using cuff size: regular    Questioned patient about current smoking habits.  Pt. has never smoked.          Yandy Bermudez, [facility]              "

## 2023-06-12 ENCOUNTER — NURSE TRIAGE (OUTPATIENT)
Dept: OBGYN | Facility: CLINIC | Age: 28
End: 2023-06-12

## 2023-06-12 ENCOUNTER — HOSPITAL ENCOUNTER (OUTPATIENT)
Facility: CLINIC | Age: 28
Discharge: HOME | End: 2023-06-12
Attending: STUDENT IN AN ORGANIZED HEALTH CARE EDUCATION/TRAINING PROGRAM | Admitting: OBSTETRICS & GYNECOLOGY
Payer: COMMERCIAL

## 2023-06-12 ENCOUNTER — HOSPITAL ENCOUNTER (OUTPATIENT)
Facility: CLINIC | Age: 28
End: 2023-06-12
Admitting: OBSTETRICS & GYNECOLOGY
Payer: COMMERCIAL

## 2023-06-12 VITALS
RESPIRATION RATE: 18 BRPM | DIASTOLIC BLOOD PRESSURE: 81 MMHG | WEIGHT: 191 LBS | SYSTOLIC BLOOD PRESSURE: 127 MMHG | TEMPERATURE: 98.4 F | BODY MASS INDEX: 32.61 KG/M2 | HEIGHT: 64 IN

## 2023-06-12 PROBLEM — Z36.89 ENCOUNTER FOR TRIAGE IN PREGNANT PATIENT: Status: ACTIVE | Noted: 2023-06-12

## 2023-06-12 LAB
ALBUMIN UR-MCNC: NEGATIVE MG/DL
APPEARANCE UR: ABNORMAL
BACTERIA #/AREA URNS HPF: ABNORMAL /HPF
BILIRUB UR QL STRIP: NEGATIVE
C TRACH DNA SPEC QL PROBE+SIG AMP: NEGATIVE
CLUE CELLS: NORMAL
COLOR UR AUTO: ABNORMAL
GLUCOSE UR STRIP-MCNC: NEGATIVE MG/DL
HGB UR QL STRIP: NEGATIVE
KETONES UR STRIP-MCNC: NEGATIVE MG/DL
LEUKOCYTE ESTERASE UR QL STRIP: ABNORMAL
MUCOUS THREADS #/AREA URNS LPF: PRESENT /LPF
N GONORRHOEA DNA SPEC QL NAA+PROBE: NEGATIVE
NITRATE UR QL: NEGATIVE
PH UR STRIP: 6.5 [PH] (ref 5–7)
RBC URINE: 7 /HPF
SP GR UR STRIP: 1.01 (ref 1–1.03)
SQUAMOUS EPITHELIAL: 13 /HPF
TRICHOMONAS, WET PREP: NORMAL
UROBILINOGEN UR STRIP-MCNC: NORMAL MG/DL
WBC URINE: 5 /HPF
WBC'S/HIGH POWER FIELD, WET PREP: NORMAL
YEAST, WET PREP: NORMAL

## 2023-06-12 PROCEDURE — 81001 URINALYSIS AUTO W/SCOPE: CPT

## 2023-06-12 PROCEDURE — 87491 CHLMYD TRACH DNA AMP PROBE: CPT

## 2023-06-12 PROCEDURE — 87210 SMEAR WET MOUNT SALINE/INK: CPT

## 2023-06-12 PROCEDURE — G0463 HOSPITAL OUTPT CLINIC VISIT: HCPCS

## 2023-06-12 PROCEDURE — 87591 N.GONORRHOEAE DNA AMP PROB: CPT

## 2023-06-12 RX ORDER — LIDOCAINE 40 MG/G
CREAM TOPICAL
Status: DISCONTINUED | OUTPATIENT
Start: 2023-06-12 | End: 2023-06-12 | Stop reason: HOSPADM

## 2023-06-12 RX ORDER — ASPIRIN 81 MG/1
81 TABLET ORAL DAILY
Status: ON HOLD | COMMUNITY
End: 2023-08-04

## 2023-06-12 ASSESSMENT — ACTIVITIES OF DAILY LIVING (ADL)
ADLS_ACUITY_SCORE: 35
ADLS_ACUITY_SCORE: 35

## 2023-06-12 NOTE — TELEPHONE ENCOUNTER
Patient calling -       27w4d    Patient reports sudden onset of constant abdominal pain at 0750.      Describes the pain as constant sharp tightness to entire abdomen and rates pain as a 6/10    Reports pain is nauseating, feels lightheaded from pain with standing.  Nothing is making the pain better or worse.    Denies vaginal bleeding or leaking of fluid  Feeling fetal movement.  Reports last BM , no issues with constipation.    Patient reports she has not felt anything like this before.  Currently working at [employer], would like to be evaluated on L&D.  [facility]  Patient will go upstairs to be evaluated.    Report given to charge RN, Raven, at [facility].    Lisy MYLES RN    Reason for Disposition    MODERATE-SEVERE abdominal pain (e.g., interferes with normal activities, awakens from sleep)    Additional Information    Negative: Passed out (i.e., fainted, collapsed and was not responding)    Negative: Shock suspected (e.g., cold/pale/clammy skin, too weak to stand, low BP, rapid pulse)    Negative: Difficult to awaken or acting confused (e.g., disoriented, slurred speech)    Negative: SEVERE abdominal pain (e.g., excruciating), constant, and present > 1 hour    Negative: SEVERE vaginal bleeding (e.g., continuous red blood from vagina, large blood clots)    Negative: Sounds like a life-threatening emergency to the triager    Negative: Having contractions or other symptoms of labor (such as vaginal pressure) and pregnant 37 or more weeks (i.e., term pregnancy)    Negative: Having contractions or other symptoms of labor (such as vaginal pressure) and < 37 weeks pregnant (i.e., )    Negative: Abdominal pain and pregnant < 20 weeks    Negative: Vomiting red blood or black (coffee ground) material    Negative: SEVERE headache that is not relieved with acetaminophen (e.g., Tylenol)    Negative: Vaginal bleeding or spotting    Negative: Baby moving less today (e.g., kick count < 5  in 1 hour or < 10 in 2 hours) and 23 or more weeks pregnant    Negative: Leakage of fluid from vagina    Negative: New hand or face swelling    Negative: New blurred vision or vision changes    Negative: Upper abdominal pain and Systolic BP >= 140 OR Diastolic BP >= 90    Protocols used: PREGNANCY - ABDOMINAL PAIN GREATER THAN 20 WEEKS EGA-A-OH

## 2023-06-12 NOTE — PLAN OF CARE
Pt to triage for evaluation of abd pain. Started at 0750, constant, doesn't radiate, no additional pain with palpation, abd soft, no diarrhea or constipation, some nausea r/t pain,  no LOF or bleeding. Baby is active. Doesn't feel like contractions. Cervix closed long and high per Dr. White. Lab specimens sent and awaiting results. Pt reports feeling better supine position. No ctx per toco, FHR AGA.
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
## 2023-06-12 NOTE — DISCHARGE INSTRUCTIONS
Discharge Instruction for Undelivered Patients      You were seen for:  abdominal pain  We Consulted: Dr. White, Dr. Richardson  You had (Test or Medicine): labs     Diet:   Drink 8 to 12 glasses of liquids (milk, juice, water) every day.  You may eat meals and snacks.     Activity:  Call your doctor or nurse midwife if your baby is moving less than usual.     Call your provider if you notice:  Swelling in your face or increased swelling in your hands or legs.  Headaches that are not relieved by Tylenol (acetaminophen).  Changes in your vision (blurring: seeing spots or stars.)  Nausea (sick to your stomach) and vomiting (throwing up).   Weight gain of 5 pounds or more per week.  Heartburn that doesn't go away.  Signs of bladder infection: pain when you urinate (use the toilet), need to go more often and more urgently.  The bag of allan (rupture of membranes) breaks, or you notice leaking in your underwear.  Bright red blood in your underwear.  Abdominal (lower belly) or stomach pain.  Contractions (tightening) less than 10 minutes apart and getting stronger.  Contractions (tightening) more than 6 times in one hour.  Increase or change in vaginal discharge (note the color and amount)      Follow-up:  As scheduled in the clinic

## 2023-06-26 ENCOUNTER — PRENATAL OFFICE VISIT (OUTPATIENT)
Dept: OBGYN | Facility: CLINIC | Age: 28
End: 2023-06-26
Payer: COMMERCIAL

## 2023-06-26 VITALS — SYSTOLIC BLOOD PRESSURE: 108 MMHG | DIASTOLIC BLOOD PRESSURE: 70 MMHG | BODY MASS INDEX: 33.13 KG/M2 | WEIGHT: 193 LBS

## 2023-06-26 DIAGNOSIS — O09.899 SUPERVISION OF OTHER HIGH RISK PREGNANCY, ANTEPARTUM: Primary | ICD-10-CM

## 2023-06-26 LAB
ERYTHROCYTE [DISTWIDTH] IN BLOOD BY AUTOMATED COUNT: 13 % (ref 10–15)
GLUCOSE 1H P 50 G GLC PO SERPL-MCNC: 90 MG/DL (ref 70–129)
HCT VFR BLD AUTO: 34.5 % (ref 35–47)
HGB BLD-MCNC: 12.1 G/DL (ref 11.7–15.7)
MCH RBC QN AUTO: 33.1 PG (ref 26.5–33)
MCHC RBC AUTO-ENTMCNC: 35.1 G/DL (ref 31.5–36.5)
MCV RBC AUTO: 94 FL (ref 78–100)
PLATELET # BLD AUTO: 158 10E3/UL (ref 150–450)
RBC # BLD AUTO: 3.66 10E6/UL (ref 3.8–5.2)
T PALLIDUM AB SER QL: NONREACTIVE
WBC # BLD AUTO: 8.7 10E3/UL (ref 4–11)

## 2023-06-26 PROCEDURE — 85027 COMPLETE CBC AUTOMATED: CPT | Performed by: OBSTETRICS & GYNECOLOGY

## 2023-06-26 PROCEDURE — 86780 TREPONEMA PALLIDUM: CPT | Performed by: OBSTETRICS & GYNECOLOGY

## 2023-06-26 PROCEDURE — 90471 IMMUNIZATION ADMIN: CPT | Performed by: OBSTETRICS & GYNECOLOGY

## 2023-06-26 PROCEDURE — 99207 PR PRENATAL VISIT: CPT | Performed by: OBSTETRICS & GYNECOLOGY

## 2023-06-26 PROCEDURE — 82950 GLUCOSE TEST: CPT | Performed by: OBSTETRICS & GYNECOLOGY

## 2023-06-26 PROCEDURE — 36415 COLL VENOUS BLD VENIPUNCTURE: CPT | Performed by: OBSTETRICS & GYNECOLOGY

## 2023-06-26 PROCEDURE — 90715 TDAP VACCINE 7 YRS/> IM: CPT | Performed by: OBSTETRICS & GYNECOLOGY

## 2023-06-26 NOTE — PATIENT INSTRUCTIONS
You can reach your [facility] Care Team any time of the day by calling [phone]. This number will put you in touch with the 24 hour nurse line if the clinic is closed.    To contact your OB/GYN Station Coordinator/Surgery Scheduler please call [phone]. This is a direct number for your care team between 8 a.m. and 4 p.m. Monday through Friday.    [facility] is open for your convenience:  Monday through Friday 8 a.m. to 6 p.m.  Closed weekends and all major holidays.

## 2023-06-26 NOTE — PROGRESS NOTES
Echo Mcgovern is a 27 year old female, 29w4d, Estimated Date of Delivery: Sep 7, 2023 who presents for prenatal care.    Good fetal movement.  The patient was seen at [facility] approximately 2 weeks ago for  uterine irritability.  No evidence of  labor.  She is continuing to monitor her blood pressures at home and they have all been within acceptable range.  Today I again reviewed the signs and symptoms of preeclampsia and  labor in length the patient is a good understanding.  We will do a 1 hour blood sugar screen today    A/P: Intrauterine pregnancy 29-4/7 weeks gestation doing well.  Signs and symptoms of concern discussed the patient will call.  She will continue with her baby aspirin daily

## 2023-06-26 NOTE — NURSING NOTE
"Chief Complaint   Patient presents with     Prenatal Care     27        Initial /70   Wt 87.5 kg (193 lb)   LMP 2022 (Exact Date)   BMI 33.13 kg/m   Estimated body mass index is 33.13 kg/m  as calculated from the following:    Height as of 23: 1.626 m (5' 4\").    Weight as of this encounter: 87.5 kg (193 lb).  BP completed using cuff size: regular    Questioned patient about current smoking habits.  Pt. has never smoked.          The following HM Due: NONE      The following patient reported/Care Every where data was sent to:  P ABSTRACT QUALITY INITIATIVES [[id number]]        Yandy Bermudez, [facility]                 "

## 2023-07-10 ENCOUNTER — PRENATAL OFFICE VISIT (OUTPATIENT)
Dept: OBGYN | Facility: CLINIC | Age: 28
End: 2023-07-10
Payer: COMMERCIAL

## 2023-07-10 VITALS
SYSTOLIC BLOOD PRESSURE: 128 MMHG | DIASTOLIC BLOOD PRESSURE: 82 MMHG | BODY MASS INDEX: 33.51 KG/M2 | WEIGHT: 196.3 LBS | HEIGHT: 64 IN

## 2023-07-10 DIAGNOSIS — O09.899 SUPERVISION OF OTHER HIGH RISK PREGNANCY, ANTEPARTUM: Primary | ICD-10-CM

## 2023-07-10 PROCEDURE — 99207 PR PRENATAL VISIT: CPT | Performed by: OBSTETRICS & GYNECOLOGY

## 2023-07-10 NOTE — PROGRESS NOTES
Echo Mcgovern is a 27 year old female, 31w4d, Estimated Date of Delivery: Sep 7, 2023 who presents for prenatal care.    Good fetal movement.  Patient is doing well without concerns.  Her home blood pressures have been acceptable.  She is on a baby aspirin a day.  She has no signs or symptoms to suggest preeclampsia or symptoms of depression    Intrauterine pregnancy 31-4/7 weeks gestation doing wellA/P: Reviewed signs and symptoms of labor and concern discussed the patient will call

## 2023-07-10 NOTE — NURSING NOTE
"31w4d    Chief Complaint   Patient presents with     Prenatal Care     Noticed \"foam\" in urine for 3 days--headache for 2 days       Initial /82   Ht 1.626 m (5' 4\")   Wt 89 kg (196 lb 4.8 oz)   LMP 2022 (Exact Date)   BMI 33.69 kg/m   Estimated body mass index is 33.69 kg/m  as calculated from the following:    Height as of this encounter: 1.626 m (5' 4\").    Weight as of this encounter: 89 kg (196 lb 4.8 oz).  BP completed using cuff size: regular long    Questioned patient about current smoking habits.  Pt. has never smoked.          The following HM Due: NONE           "

## 2023-07-10 NOTE — PATIENT INSTRUCTIONS
You can reach your [facility] Care Team any time of the day by calling [phone]. This number will put you in touch with the 24 hour nurse line if the clinic is closed.    To contact your OB/GYN Station Coordinator/Surgery Scheduler please call [phone]. This is a direct number for your care team between 8 a.m. and 4 p.m. Monday through Friday.    [facility] is open for your convenience:  Monday through Friday 8 a.m. to 6 p.m.  Closed weekends and all major holidays.

## 2023-07-18 ENCOUNTER — PRENATAL OFFICE VISIT (OUTPATIENT)
Dept: OBGYN | Facility: CLINIC | Age: 28
End: 2023-07-18
Payer: COMMERCIAL

## 2023-07-18 ENCOUNTER — TELEPHONE (OUTPATIENT)
Dept: OBGYN | Facility: CLINIC | Age: 28
End: 2023-07-18

## 2023-07-18 ENCOUNTER — HOSPITAL ENCOUNTER (OUTPATIENT)
Facility: CLINIC | Age: 28
Discharge: HOME | End: 2023-07-18
Attending: OBSTETRICS & GYNECOLOGY | Admitting: OBSTETRICS & GYNECOLOGY
Payer: COMMERCIAL

## 2023-07-18 ENCOUNTER — APPOINTMENT (OUTPATIENT)
Dept: ULTRASOUND IMAGING | Facility: CLINIC | Age: 28
End: 2023-07-18
Attending: OBSTETRICS & GYNECOLOGY
Payer: COMMERCIAL

## 2023-07-18 VITALS
WEIGHT: 198 LBS | BODY MASS INDEX: 33.8 KG/M2 | DIASTOLIC BLOOD PRESSURE: 84 MMHG | RESPIRATION RATE: 16 BRPM | HEART RATE: 131 BPM | TEMPERATURE: 98.3 F | HEIGHT: 64 IN | SYSTOLIC BLOOD PRESSURE: 120 MMHG

## 2023-07-18 VITALS — WEIGHT: 199 LBS | DIASTOLIC BLOOD PRESSURE: 100 MMHG | SYSTOLIC BLOOD PRESSURE: 134 MMHG | BODY MASS INDEX: 34.16 KG/M2

## 2023-07-18 DIAGNOSIS — O09.899 SUPERVISION OF OTHER HIGH RISK PREGNANCY, ANTEPARTUM: Primary | ICD-10-CM

## 2023-07-18 DIAGNOSIS — R03.0 ELEVATED BP WITHOUT DIAGNOSIS OF HYPERTENSION: ICD-10-CM

## 2023-07-18 PROBLEM — O13.3 PIH (PREGNANCY INDUCED HYPERTENSION), THIRD TRIMESTER: Status: ACTIVE | Noted: 2023-07-18

## 2023-07-18 LAB
ALBUMIN MFR UR ELPH: 8.1 MG/DL
ALBUMIN SERPL BCG-MCNC: 3.7 G/DL (ref 3.5–5.2)
ALP SERPL-CCNC: 85 U/L (ref 35–104)
ALT SERPL W P-5'-P-CCNC: 20 U/L (ref 0–50)
ANION GAP SERPL CALCULATED.3IONS-SCNC: 13 MMOL/L (ref 7–15)
AST SERPL W P-5'-P-CCNC: 25 U/L (ref 0–45)
BILIRUB SERPL-MCNC: 0.2 MG/DL
BUN SERPL-MCNC: 6.2 MG/DL (ref 6–20)
CALCIUM SERPL-MCNC: 9.3 MG/DL (ref 8.6–10)
CHLORIDE SERPL-SCNC: 105 MMOL/L (ref 98–107)
CREAT SERPL-MCNC: 0.49 MG/DL (ref 0.51–0.95)
CREAT UR-MCNC: 66.9 MG/DL
DEPRECATED HCO3 PLAS-SCNC: 17 MMOL/L (ref 22–29)
ERYTHROCYTE [DISTWIDTH] IN BLOOD BY AUTOMATED COUNT: 13 % (ref 10–15)
GFR SERPL CREATININE-BSD FRML MDRD: >90 ML/MIN/1.73M2
GLUCOSE SERPL-MCNC: 76 MG/DL (ref 70–99)
HCT VFR BLD AUTO: 36 % (ref 35–47)
HGB BLD-MCNC: 12.3 G/DL (ref 11.7–15.7)
MCH RBC QN AUTO: 32.2 PG (ref 26.5–33)
MCHC RBC AUTO-ENTMCNC: 34.2 G/DL (ref 31.5–36.5)
MCV RBC AUTO: 94 FL (ref 78–100)
PLATELET # BLD AUTO: 168 10E3/UL (ref 150–450)
POTASSIUM SERPL-SCNC: 4.1 MMOL/L (ref 3.4–5.3)
PROT SERPL-MCNC: 6.7 G/DL (ref 6.4–8.3)
PROT/CREAT 24H UR: 0.12 MG/MG CR (ref 0–0.2)
RBC # BLD AUTO: 3.82 10E6/UL (ref 3.8–5.2)
SODIUM SERPL-SCNC: 135 MMOL/L (ref 136–145)
TSH SERPL DL<=0.005 MIU/L-ACNC: 2.01 UIU/ML (ref 0.3–4.2)
WBC # BLD AUTO: 11.6 10E3/UL (ref 4–11)

## 2023-07-18 PROCEDURE — 80053 COMPREHEN METABOLIC PANEL: CPT | Performed by: OBSTETRICS & GYNECOLOGY

## 2023-07-18 PROCEDURE — 84156 ASSAY OF PROTEIN URINE: CPT | Performed by: OBSTETRICS & GYNECOLOGY

## 2023-07-18 PROCEDURE — 85027 COMPLETE CBC AUTOMATED: CPT | Performed by: OBSTETRICS & GYNECOLOGY

## 2023-07-18 PROCEDURE — G0463 HOSPITAL OUTPT CLINIC VISIT: HCPCS | Mod: 25

## 2023-07-18 PROCEDURE — 99213 OFFICE O/P EST LOW 20 MIN: CPT | Performed by: OBSTETRICS & GYNECOLOGY

## 2023-07-18 PROCEDURE — 36415 COLL VENOUS BLD VENIPUNCTURE: CPT | Performed by: OBSTETRICS & GYNECOLOGY

## 2023-07-18 PROCEDURE — 93005 ELECTROCARDIOGRAM TRACING: CPT

## 2023-07-18 PROCEDURE — 84443 ASSAY THYROID STIM HORMONE: CPT | Performed by: OBSTETRICS & GYNECOLOGY

## 2023-07-18 PROCEDURE — 93010 ELECTROCARDIOGRAM REPORT: CPT | Performed by: INTERNAL MEDICINE

## 2023-07-18 PROCEDURE — 99207 PR PRENATAL VISIT: CPT | Performed by: OBSTETRICS & GYNECOLOGY

## 2023-07-18 PROCEDURE — 76819 FETAL BIOPHYS PROFIL W/O NST: CPT

## 2023-07-18 RX ORDER — PROCHLORPERAZINE 25 MG
25 SUPPOSITORY, RECTAL RECTAL EVERY 12 HOURS PRN
Status: DISCONTINUED | OUTPATIENT
Start: 2023-07-18 | End: 2023-07-18 | Stop reason: HOSPADM

## 2023-07-18 RX ORDER — METOCLOPRAMIDE 10 MG/1
10 TABLET ORAL EVERY 6 HOURS PRN
Status: DISCONTINUED | OUTPATIENT
Start: 2023-07-18 | End: 2023-07-18 | Stop reason: HOSPADM

## 2023-07-18 RX ORDER — PROCHLORPERAZINE MALEATE 10 MG
10 TABLET ORAL EVERY 6 HOURS PRN
Status: DISCONTINUED | OUTPATIENT
Start: 2023-07-18 | End: 2023-07-18 | Stop reason: HOSPADM

## 2023-07-18 RX ORDER — ONDANSETRON 4 MG/1
4 TABLET, ORALLY DISINTEGRATING ORAL EVERY 6 HOURS PRN
Status: DISCONTINUED | OUTPATIENT
Start: 2023-07-18 | End: 2023-07-18 | Stop reason: HOSPADM

## 2023-07-18 RX ORDER — ONDANSETRON 2 MG/ML
4 INJECTION INTRAMUSCULAR; INTRAVENOUS EVERY 6 HOURS PRN
Status: DISCONTINUED | OUTPATIENT
Start: 2023-07-18 | End: 2023-07-18 | Stop reason: HOSPADM

## 2023-07-18 RX ORDER — METOCLOPRAMIDE HYDROCHLORIDE 5 MG/ML
10 INJECTION INTRAMUSCULAR; INTRAVENOUS EVERY 6 HOURS PRN
Status: DISCONTINUED | OUTPATIENT
Start: 2023-07-18 | End: 2023-07-18 | Stop reason: HOSPADM

## 2023-07-18 ASSESSMENT — ACTIVITIES OF DAILY LIVING (ADL)
ADLS_ACUITY_SCORE: 35
ADLS_ACUITY_SCORE: 31

## 2023-07-18 NOTE — PROGRESS NOTES
Echo Mcgovern is a 27 year old female, 32w5d, Estimated Date of Delivery: Sep 7, 2023 who presents for prenatal care.  Good fetal movement.  Patient was at work today and noticed headaches and blurred vision.  Her headaches are bitemporal and retro-orbital.  Also notices some mild right upper quadrant discomfort.  The patient took her blood pressure at work the initial value was 148/96.  She laid down and rested subsequent blood pressure was 148/93.  The patient called the office and was asked to come in for evaluation.  Here in the office her blood pressure was 134/100.  Deep tendon reflexes are brisk with 1-2 beats of clonus bilaterally.  She has 1+ pedal edema.  I reviewed her past history of preeclampsia with her 2 previous pregnancies reviewed the pathophysiology of the disease process and after this discussion made decision to send the patient to labor and delivery for further evaluation    A/P: Intrauterine pregnancy 32-5/7 weeks gestation elevated blood pressures rule out preeclampsia.  We will check PIH labs we will get an ultrasound biophysical profile serial blood pressures and develop an appropriate therapy plan based on results

## 2023-07-18 NOTE — PLAN OF CARE
"Data: Patient presented to [facility]: 2023  3:03 PM.  Reason for maternal/fetal assessment is elevated blood pressures. Patient reports she was at work and became \"hot, which happened last time my blood pressures were high\" Patient checked her BP at work and it was 148/96 and followed up in clinic where her BP was 134/100. Patient also reports she has had a headache for the past week that hasn't been relieved with rest or tylenol, although patient reports she hasn't taken anything recently since it didn't help initially. Patient also reports episode of blurred vision today where her computer screen became fuzzy and it took a minute to refocus.  Patient is a .  Prenatal record reviewed. Pregnancy  has been complicated by history of preeclampsia with previous 2 pregnancies..  Gestational Age 32w5d. VSS. Fetal movement active. Patient denies uterine contractions, leaking of vaginal fluid/rupture of membranes, vaginal bleeding, abdominal pain, pelvic pressure, nausea, vomiting, epigastric or URQ pain, significant edema. Support person, Ministerio is present.   Action: Verbal consent for EFM. Triage assessment completed. Bill of rights reviewed. TORB received from Dr. Anne for NST, CBC with platelets, CMP, protein random urine, serial BPs, and BPP.  Response: Patient verbalized agreement with plan. Will contact Dr Shanice Farrar with update and for further orders.     "
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
## 2023-07-18 NOTE — NURSING NOTE
"Chief Complaint   Patient presents with     Prenatal Care     Blood pressure high and swelling in the hands and feeling hot        Initial BP (!) 134/100   Wt 90.3 kg (199 lb)   LMP 2022 (Exact Date)   BMI 34.16 kg/m   Estimated body mass index is 34.16 kg/m  as calculated from the following:    Height as of 7/10/23: 1.626 m (5' 4\").    Weight as of this encounter: 90.3 kg (199 lb).  BP completed using cuff size: regular    Questioned patient about current smoking habits.  Pt. has never smoked.          The following HM Due: NONE      The following patient reported/Care Every where data was sent to:  P ABSTRACT QUALITY INITIATIVES [[id number]]        Yandy Bermudez CMA                 "

## 2023-07-18 NOTE — PATIENT INSTRUCTIONS
You can reach your [place] Care Team any time of the day by calling [phone]. This number will put you in touch with the 24 hour nurse line if the clinic is closed.    To contact your OB/GYN Station Coordinator/Surgery Scheduler please call [phone]. This is a direct number for your care team between 8 a.m. and 4 p.m. Monday through Friday.    [facility] is open for your convenience:  Monday through Friday 8 a.m. to 6 p.m.  Closed weekends and all major holidays.

## 2023-07-18 NOTE — PLAN OF CARE
Discharge instructions reviewed with patient. Understanding verbalized. Discharged to home ambulatory.

## 2023-07-18 NOTE — TELEPHONE ENCOUNTER
Spoke to pt.    No HA or vision disturbance noted.   Laying down, 139/90 while at work currently.    Some swelling in hands.   Added to Dr Anne schedule.    Denise WIGGINS RN BSN

## 2023-07-18 NOTE — PROVIDER NOTIFICATION
"   07/18/23 1640   Provider Notification   Provider Name/Title Dr. Farrar   Method of Notification At Bedside   Request Evaluate in Person   Notification Reason Status Update;Lab/Diagnostic Study   Dr. Farrar viewed lab result, BPP 8/8. Occasional contraction and some irritability noted on monitor. Admits to maybe fee;omg something \"a couple' time an hour. Patient admits to a headache, declines taking tylenol as it usually doesn't help. /82. Heart rate 122-131 since admission. Patient states heart rate runs in the 140's a lot of days.  Dr. Farrar updated. 1640-- Dr. Farrar at bedside. Plan per provider: obtain EKG and TSH with free reflex to T4. IF EKG OK, discharge patient to home, does not need to wait for TSH results. Patient to see provider in 1 week. Patient to call if increase in headache, right upper quadrant pain or epigastric pain, nausea vomiting or any other questions or concerns. 0515- EKG results given to Dr. Farrar. OK to discharge patient.  "

## 2023-07-18 NOTE — DISCHARGE INSTRUCTIONS
Discharge Instruction for Undelivered Patients      You were seen for:  BP checks, Pre E evaluation  We Consulted: Dr. Farrar  You had (Test or Medicine):Fetal and uterine monitoring, serial BP's, BPP and EKG     Diet:   Drink 8 to 12 glasses of liquids (milk, juice, water) every day.  You may eat meals and snacks.     Activity:  Call your doctor or nurse midwife if your baby is moving less than usual.     Call your provider if you notice:  Swelling in your face or increased swelling in your hands or legs.  Headaches that are not relieved by Tylenol (acetaminophen).  Changes in your vision (blurring: seeing spots or stars.)  Nausea (sick to your stomach) and vomiting (throwing up).   Weight gain of 5 pounds or more per week.  Heartburn that doesn't go away.  Signs of bladder infection: pain when you urinate (use the toilet), need to go more often and more urgently.  The bag of allan (rupture of membranes) breaks, or you notice leaking in your underwear.  Bright red blood in your underwear.  Abdominal (lower belly) or stomach pain.  For first baby: Contractions (tightening) less than 5 minutes apart for one hour or more.  Second (plus) baby: Contractions (tightening) less than 10 minutes apart and getting stronger.  *If less than 34 weeks: Contractions (tightening) more than 6 times in one hour.  Increase or change in vaginal discharge (note the color and amount)  Other: Call provider if any questions or concerns.     Follow-up:  Make any appointment to be seen at the clinic in 1 week.

## 2023-07-20 LAB
ATRIAL RATE - MUSE: 126 BPM
DIASTOLIC BLOOD PRESSURE - MUSE: NORMAL MMHG
INTERPRETATION ECG - MUSE: NORMAL
P AXIS - MUSE: -16 DEGREES
PR INTERVAL - MUSE: 140 MS
QRS DURATION - MUSE: 78 MS
QT - MUSE: 316 MS
QTC - MUSE: 457 MS
R AXIS - MUSE: 54 DEGREES
SYSTOLIC BLOOD PRESSURE - MUSE: NORMAL MMHG
T AXIS - MUSE: 0 DEGREES
VENTRICULAR RATE- MUSE: 126 BPM

## 2023-07-24 ENCOUNTER — APPOINTMENT (OUTPATIENT)
Dept: ULTRASOUND IMAGING | Facility: CLINIC | Age: 28
End: 2023-07-24
Attending: OBSTETRICS & GYNECOLOGY
Payer: COMMERCIAL

## 2023-07-24 ENCOUNTER — PRENATAL OFFICE VISIT (OUTPATIENT)
Dept: OBGYN | Facility: CLINIC | Age: 28
End: 2023-07-24
Payer: COMMERCIAL

## 2023-07-24 ENCOUNTER — HOSPITAL ENCOUNTER (OUTPATIENT)
Facility: CLINIC | Age: 28
Discharge: HOME | End: 2023-07-24
Attending: OBSTETRICS & GYNECOLOGY | Admitting: OBSTETRICS & GYNECOLOGY
Payer: COMMERCIAL

## 2023-07-24 VITALS
OXYGEN SATURATION: 95 % | WEIGHT: 198 LBS | TEMPERATURE: 98.6 F | RESPIRATION RATE: 16 BRPM | HEIGHT: 64 IN | SYSTOLIC BLOOD PRESSURE: 134 MMHG | BODY MASS INDEX: 33.8 KG/M2 | DIASTOLIC BLOOD PRESSURE: 92 MMHG

## 2023-07-24 VITALS — SYSTOLIC BLOOD PRESSURE: 138 MMHG | BODY MASS INDEX: 33.99 KG/M2 | WEIGHT: 198 LBS | DIASTOLIC BLOOD PRESSURE: 90 MMHG

## 2023-07-24 DIAGNOSIS — O09.899 SUPERVISION OF OTHER HIGH RISK PREGNANCY, ANTEPARTUM: ICD-10-CM

## 2023-07-24 DIAGNOSIS — O13.3 PIH (PREGNANCY INDUCED HYPERTENSION), THIRD TRIMESTER: Primary | ICD-10-CM

## 2023-07-24 LAB
ALBUMIN MFR UR ELPH: 5.6 MG/DL
ALBUMIN SERPL BCG-MCNC: 3.5 G/DL (ref 3.5–5.2)
ALP SERPL-CCNC: 83 U/L (ref 35–104)
ALT SERPL W P-5'-P-CCNC: 19 U/L (ref 0–50)
ANION GAP SERPL CALCULATED.3IONS-SCNC: 11 MMOL/L (ref 7–15)
AST SERPL W P-5'-P-CCNC: 21 U/L (ref 0–45)
BILIRUB SERPL-MCNC: 0.2 MG/DL
BUN SERPL-MCNC: 7.8 MG/DL (ref 6–20)
CALCIUM SERPL-MCNC: 8.9 MG/DL (ref 8.6–10)
CHLORIDE SERPL-SCNC: 105 MMOL/L (ref 98–107)
CREAT SERPL-MCNC: 0.52 MG/DL (ref 0.51–0.95)
CREAT UR-MCNC: 44.3 MG/DL
DEPRECATED HCO3 PLAS-SCNC: 19 MMOL/L (ref 22–29)
ERYTHROCYTE [DISTWIDTH] IN BLOOD BY AUTOMATED COUNT: 13.2 % (ref 10–15)
GFR SERPL CREATININE-BSD FRML MDRD: >90 ML/MIN/1.73M2
GLUCOSE SERPL-MCNC: 86 MG/DL (ref 70–99)
HCT VFR BLD AUTO: 35 % (ref 35–47)
HGB BLD-MCNC: 12.1 G/DL (ref 11.7–15.7)
MCH RBC QN AUTO: 33 PG (ref 26.5–33)
MCHC RBC AUTO-ENTMCNC: 34.6 G/DL (ref 31.5–36.5)
MCV RBC AUTO: 95 FL (ref 78–100)
PLATELET # BLD AUTO: 176 10E3/UL (ref 150–450)
POTASSIUM SERPL-SCNC: 4 MMOL/L (ref 3.4–5.3)
PROT SERPL-MCNC: 6.1 G/DL (ref 6.4–8.3)
PROT/CREAT 24H UR: 0.13 MG/MG CR (ref 0–0.2)
RBC # BLD AUTO: 3.67 10E6/UL (ref 3.8–5.2)
SODIUM SERPL-SCNC: 135 MMOL/L (ref 136–145)
WBC # BLD AUTO: 11 10E3/UL (ref 4–11)

## 2023-07-24 PROCEDURE — G0463 HOSPITAL OUTPT CLINIC VISIT: HCPCS | Mod: 25

## 2023-07-24 PROCEDURE — 76819 FETAL BIOPHYS PROFIL W/O NST: CPT

## 2023-07-24 PROCEDURE — 84156 ASSAY OF PROTEIN URINE: CPT | Performed by: OBSTETRICS & GYNECOLOGY

## 2023-07-24 PROCEDURE — 250N000013 HC RX MED GY IP 250 OP 250 PS 637: Performed by: OBSTETRICS & GYNECOLOGY

## 2023-07-24 PROCEDURE — 80053 COMPREHEN METABOLIC PANEL: CPT | Performed by: OBSTETRICS & GYNECOLOGY

## 2023-07-24 PROCEDURE — 85027 COMPLETE CBC AUTOMATED: CPT | Performed by: OBSTETRICS & GYNECOLOGY

## 2023-07-24 PROCEDURE — 99207 PR PRENATAL VISIT: CPT | Performed by: OBSTETRICS & GYNECOLOGY

## 2023-07-24 PROCEDURE — 59025 FETAL NON-STRESS TEST: CPT | Mod: 26 | Performed by: OBSTETRICS & GYNECOLOGY

## 2023-07-24 PROCEDURE — 93005 ELECTROCARDIOGRAM TRACING: CPT

## 2023-07-24 PROCEDURE — 93010 ELECTROCARDIOGRAM REPORT: CPT | Performed by: INTERNAL MEDICINE

## 2023-07-24 PROCEDURE — 59025 FETAL NON-STRESS TEST: CPT | Mod: XU

## 2023-07-24 PROCEDURE — 36415 COLL VENOUS BLD VENIPUNCTURE: CPT | Performed by: OBSTETRICS & GYNECOLOGY

## 2023-07-24 PROCEDURE — 99215 OFFICE O/P EST HI 40 MIN: CPT | Performed by: FAMILY MEDICINE

## 2023-07-24 RX ORDER — METOCLOPRAMIDE 10 MG/1
10 TABLET ORAL EVERY 6 HOURS PRN
Status: DISCONTINUED | OUTPATIENT
Start: 2023-07-24 | End: 2023-07-24 | Stop reason: HOSPADM

## 2023-07-24 RX ORDER — PROCHLORPERAZINE 25 MG
25 SUPPOSITORY, RECTAL RECTAL EVERY 12 HOURS PRN
Status: DISCONTINUED | OUTPATIENT
Start: 2023-07-24 | End: 2023-07-24 | Stop reason: HOSPADM

## 2023-07-24 RX ORDER — ONDANSETRON 2 MG/ML
4 INJECTION INTRAMUSCULAR; INTRAVENOUS EVERY 6 HOURS PRN
Status: DISCONTINUED | OUTPATIENT
Start: 2023-07-24 | End: 2023-07-24 | Stop reason: HOSPADM

## 2023-07-24 RX ORDER — METOCLOPRAMIDE HYDROCHLORIDE 5 MG/ML
10 INJECTION INTRAMUSCULAR; INTRAVENOUS EVERY 6 HOURS PRN
Status: DISCONTINUED | OUTPATIENT
Start: 2023-07-24 | End: 2023-07-24 | Stop reason: HOSPADM

## 2023-07-24 RX ORDER — ONDANSETRON 4 MG/1
4 TABLET, ORALLY DISINTEGRATING ORAL EVERY 6 HOURS PRN
Status: DISCONTINUED | OUTPATIENT
Start: 2023-07-24 | End: 2023-07-24 | Stop reason: HOSPADM

## 2023-07-24 RX ORDER — PROCHLORPERAZINE MALEATE 10 MG
10 TABLET ORAL EVERY 6 HOURS PRN
Status: DISCONTINUED | OUTPATIENT
Start: 2023-07-24 | End: 2023-07-24 | Stop reason: HOSPADM

## 2023-07-24 RX ORDER — ACETAMINOPHEN 500 MG
1000 TABLET ORAL ONCE
Status: COMPLETED | OUTPATIENT
Start: 2023-07-24 | End: 2023-07-24

## 2023-07-24 RX ADMIN — ACETAMINOPHEN 1000 MG: 500 TABLET, FILM COATED ORAL at 15:44

## 2023-07-24 ASSESSMENT — ACTIVITIES OF DAILY LIVING (ADL)
ADLS_ACUITY_SCORE: 35
ADLS_ACUITY_SCORE: 35

## 2023-07-24 NOTE — NURSING NOTE
"Chief Complaint   Patient presents with    Prenatal Care       Initial BP (!) 138/90   Wt 89.8 kg (198 lb)   LMP 2022 (Exact Date)   BMI 33.99 kg/m   Estimated body mass index is 33.99 kg/m  as calculated from the following:    Height as of 23: 1.626 m (5' 4\").    Weight as of this encounter: 89.8 kg (198 lb).  BP completed using cuff size: regular    Questioned patient about current smoking habits.  Pt. has never smoked.          The following HM Due: NONE      The following patient reported/Care Every where data was sent to:  P ABSTRACT QUALITY INITIATIVES [[id number]]        Yandy Bermudez, [facility]                 "

## 2023-07-24 NOTE — PROVIDER NOTIFICATION
07/24/23 1505   Provider Notification   Provider Name/Title Dr De Leon   Method of Notification Phone   Request Evaluate - Remote   Notification Reason Status Update     Updated re: vital signs, ECG sinus tachy, Reactive NST, persistent headache, feeling exhausted, BPP 6/8 off for breathing. Orders for Tylenol and then to reevaluate in one hour. Okay to leave off monitor until then.

## 2023-07-24 NOTE — PROVIDER NOTIFICATION
07/24/23 1250   Provider Notification   Provider Name/Title Dr De Leon   Method of Notification Phone   Request Evaluate - Remote   Notification Reason Status Update     Updated re:pt arrival for pre-e r/o sent in by Dr Anne for BP at home 150's/90's.. First BP WNL however heart rate is 130-140's and pt has a headache. She states she has had headache and elevated heart rate for a couple weeks. Order for oral hydration and ECG.

## 2023-07-24 NOTE — PLAN OF CARE
Data: Patient assessed in the [facility] for elevated blood pressures.  Cervical exam not examined.  Membranes intact.  Contractions/uterine assessment: no contractions.  Action:  Presumed adequate fetal oxygenation documented (see flow record). Discharge instructions reviewed.  Patient instructed to report change in fetal movement, vaginal leaking of fluid or bleeding, abdominal pain, elevated BP's, worsening headache, or any concerns related to the pregnancy to her nurse/physician.    Response: Orders to discharge home per Teri De Leon.  Patient verbalized understanding of education and verbalized agreement with plan. Discharged to home at 1700.

## 2023-07-24 NOTE — PROVIDER NOTIFICATION
07/24/23 1535   Provider Notification   Provider Name/Title Dr De Leon   Method of Notification At Bedside   Notification Reason Status Update     Dr De Leon at bedside talking to patient about symptoms of headache and tachycardia. Order for 1000mg tylenol now to see if it improves headache.

## 2023-07-24 NOTE — PROVIDER NOTIFICATION
07/24/23 1650   Provider Notification   Provider Name/Title Dr De Leon   Method of Notification Phone   Request Evaluate - Remote   Notification Reason Status Update     Dr De Leon updated re: pt states headache is not gone but has improved, rating it now a 2 on a pain scale compared to 4 before tylenol. Orders for discharge received if patient feels comfortable with close follow up in clinic. Pt agrees with plan, will continue to check BP at home as previously instructed and will follow up in clinic.
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
## 2023-07-24 NOTE — DISCHARGE INSTRUCTIONS
Discharge Instruction for Undelivered Patients      You were seen for:  hypertension eval  We Consulted: Dr De Leon  You had (Test or Medicine):fetal and uterine monitoring, US, BP checks, labs     Diet:   Drink 8 to 12 glasses of liquids (milk, juice, water) every day.  You may eat meals and snacks.     Activity:  Count fetal kicks everyday (see handout)  Call your doctor or nurse midwife if your baby is moving less than usual.     Call your provider if you notice:  Swelling in your face or increased swelling in your hands or legs.  Headaches that are not relieved by Tylenol (acetaminophen).  Changes in your vision (blurring: seeing spots or stars.)  Nausea (sick to your stomach) and vomiting (throwing up).   Weight gain of 5 pounds or more per week.  Heartburn that doesn't go away.  Signs of bladder infection: pain when you urinate (use the toilet), need to go more often and more urgently.  The bag of allan (rupture of membranes) breaks, or you notice leaking in your underwear.  Bright red blood in your underwear.  Abdominal (lower belly) or stomach pain.  Second (plus) baby: Contractions (tightening) less than 10 minutes apart and getting stronger.  *If less than 34 weeks: Contractions (tightening) more than 6 times in one hour.  Increase or change in vaginal discharge (note the color and amount)  Other: Call clinic with any questions or concerns.    Follow-up:  Follow up in clinic this week.

## 2023-07-24 NOTE — PLAN OF CARE
Data: [facility]: 2023 11:53 AM.  Reason for maternal/fetal assessment is elevated blood pressures. Patient reports she had /90's at home, has had a headache and elevated heart rate in 120's+ for the past couple weeks.  Patient is a .  Prenatal record reviewed. Pregnancy  has been complicated by history of pre-e with both prior pregnancy.  Gestational Age 33w4d. VSS. Fetal movement active. Patient denies uterine contractions, leaking of vaginal fluid/rupture of membranes, vaginal bleeding, visual disturbances, epigastric or URQ pain, significant edema. Support person is present.   Action: Verbal consent for EFM. Triage assessment completed. Bill of rights reviewed.  Response: Patient verbalized agreement with plan. Will contact Dr De Leon with update and for further orders.

## 2023-07-24 NOTE — PATIENT INSTRUCTIONS
You can reach your [place] Care Team any time of the day by calling [phone]. This number will put you in touch with the 24 hour nurse line if the clinic is closed.    To contact your OB/GYN Station Coordinator/Surgery Scheduler please call [phone]. This is a direct number for your care team between 8 a.m. and 4 p.m. Monday through Friday.    [facility] is open for your convenience:  Monday through Friday 8 a.m. to 6 p.m.  Closed weekends and all major holidays.

## 2023-07-25 NOTE — PROGRESS NOTES
Echo Mcgovern is a 27 year old female, 33w5d, Estimated Date of Delivery: Sep 7, 2023 who presents for prenatal care.    Good fetal movement.  Patient's home blood pressures today were 150/90 and a repeat 1 hour later was 153/92.  The patient's heart rate was 152.  The patient states that she has had a generalized headache been unrelieved with Tylenol and has some marginal visual change without  right upper quadrant discomfort.  She denies other related neurologic symptoms.  I reviewed her previous history of preeclampsia.  The patient continues to take 81 mg of baby aspirin daily.  I reviewed her previous normal PIH lab profile.  Deep tendon reflexes today were 2+ and symmetrical with no clonus and    A/P: Intrauterine pregnancy 33-4/7 weeks gestation with elevated blood pressures.  The patient was sent to labor and delivery for monitoring.  I also discussed her situation with Dr. Johanna Santana a perinatologist.  At this point Dr. Santana states that she probably would not administer betamethasone at this time in light of her normal laboratory evaluation done today in labor and delivery.  She would see the patient twice weekly.  We also discussed having the patient off of work with activity restrictions and to continue daily blood pressure checks.  I have phoned these results and discussed them with the patient.  We will see her back later this week in the office or sooner should concerns arise

## 2023-07-26 LAB
ATRIAL RATE - MUSE: 133 BPM
DIASTOLIC BLOOD PRESSURE - MUSE: NORMAL MMHG
INTERPRETATION ECG - MUSE: NORMAL
P AXIS - MUSE: -14 DEGREES
PR INTERVAL - MUSE: 130 MS
QRS DURATION - MUSE: 74 MS
QT - MUSE: 298 MS
QTC - MUSE: 443 MS
R AXIS - MUSE: 52 DEGREES
SYSTOLIC BLOOD PRESSURE - MUSE: NORMAL MMHG
T AXIS - MUSE: 5 DEGREES
VENTRICULAR RATE- MUSE: 133 BPM

## 2023-07-27 ENCOUNTER — PRENATAL OFFICE VISIT (OUTPATIENT)
Dept: OBGYN | Facility: CLINIC | Age: 28
End: 2023-07-27
Payer: COMMERCIAL

## 2023-07-27 ENCOUNTER — TRANSCRIBE ORDERS (OUTPATIENT)
Dept: MATERNAL FETAL MEDICINE | Facility: CLINIC | Age: 28
End: 2023-07-27

## 2023-07-27 VITALS
HEIGHT: 64 IN | BODY MASS INDEX: 33.92 KG/M2 | DIASTOLIC BLOOD PRESSURE: 70 MMHG | WEIGHT: 198.7 LBS | SYSTOLIC BLOOD PRESSURE: 128 MMHG

## 2023-07-27 DIAGNOSIS — O09.899 SUPERVISION OF OTHER HIGH RISK PREGNANCY, ANTEPARTUM: ICD-10-CM

## 2023-07-27 DIAGNOSIS — O14.90 PRE-ECLAMPSIA, ANTEPARTUM: Primary | ICD-10-CM

## 2023-07-27 DIAGNOSIS — O26.90 PREGNANCY RELATED CONDITION, ANTEPARTUM: Primary | ICD-10-CM

## 2023-07-27 LAB
ALBUMIN SERPL BCG-MCNC: 3.7 G/DL (ref 3.5–5.2)
ALP SERPL-CCNC: 85 U/L (ref 35–104)
ALT SERPL W P-5'-P-CCNC: 18 U/L (ref 0–50)
ANION GAP SERPL CALCULATED.3IONS-SCNC: 10 MMOL/L (ref 7–15)
AST SERPL W P-5'-P-CCNC: 22 U/L (ref 0–45)
BILIRUB SERPL-MCNC: 0.2 MG/DL
BUN SERPL-MCNC: 6.1 MG/DL (ref 6–20)
CALCIUM SERPL-MCNC: 9.2 MG/DL (ref 8.6–10)
CHLORIDE SERPL-SCNC: 105 MMOL/L (ref 98–107)
CREAT SERPL-MCNC: 0.52 MG/DL (ref 0.51–0.95)
DEPRECATED HCO3 PLAS-SCNC: 20 MMOL/L (ref 22–29)
ERYTHROCYTE [DISTWIDTH] IN BLOOD BY AUTOMATED COUNT: 12.8 % (ref 10–15)
GFR SERPL CREATININE-BSD FRML MDRD: >90 ML/MIN/1.73M2
GLUCOSE SERPL-MCNC: 71 MG/DL (ref 70–99)
HCT VFR BLD AUTO: 36.7 % (ref 35–47)
HGB BLD-MCNC: 12.7 G/DL (ref 11.7–15.7)
MCH RBC QN AUTO: 32.6 PG (ref 26.5–33)
MCHC RBC AUTO-ENTMCNC: 34.6 G/DL (ref 31.5–36.5)
MCV RBC AUTO: 94 FL (ref 78–100)
PLATELET # BLD AUTO: 175 10E3/UL (ref 150–450)
POTASSIUM SERPL-SCNC: 3.8 MMOL/L (ref 3.4–5.3)
PROT SERPL-MCNC: 6.5 G/DL (ref 6.4–8.3)
RBC # BLD AUTO: 3.89 10E6/UL (ref 3.8–5.2)
SODIUM SERPL-SCNC: 135 MMOL/L (ref 136–145)
WBC # BLD AUTO: 10 10E3/UL (ref 4–11)

## 2023-07-27 PROCEDURE — 99207 PR PRENATAL VISIT: CPT | Performed by: OBSTETRICS & GYNECOLOGY

## 2023-07-27 PROCEDURE — 36415 COLL VENOUS BLD VENIPUNCTURE: CPT | Performed by: OBSTETRICS & GYNECOLOGY

## 2023-07-27 PROCEDURE — 85027 COMPLETE CBC AUTOMATED: CPT | Performed by: OBSTETRICS & GYNECOLOGY

## 2023-07-27 PROCEDURE — 80053 COMPREHEN METABOLIC PANEL: CPT | Performed by: OBSTETRICS & GYNECOLOGY

## 2023-07-27 NOTE — PATIENT INSTRUCTIONS
You can reach your [place] Care Team any time of the day by calling [phone]. This number will put you in touch with the 24 hour nurse line if the clinic is closed.    To contact your OB/GYN Surgery Scheduler please call [phone]. This is a direct number for your care team between 8 a.m. and 4 p.m. Monday through Friday.    [facility] is open for your convenience: [phone]  Monday through Friday 8 a.m. to 8:30 p.m.  Saturday 9 a.m. to 6 p.m.  Sunday Noon to 6 p.m.    They are closed on all major holidays.

## 2023-07-27 NOTE — NURSING NOTE
"34w0d    Chief Complaint   Patient presents with    Prenatal Care     Constant headache--blood pressures average 138/92       Initial /70   Ht 1.626 m (5' 4\")   Wt 90.1 kg (198 lb 11.2 oz)   LMP 2022 (Exact Date)   BMI 34.11 kg/m   Estimated body mass index is 34.11 kg/m  as calculated from the following:    Height as of this encounter: 1.626 m (5' 4\").    Weight as of this encounter: 90.1 kg (198 lb 11.2 oz).  BP completed using cuff size: regular    Questioned patient about current smoking habits.  Pt. has never smoked.          The following HM Due: NONE    "

## 2023-07-27 NOTE — RESULT ENCOUNTER NOTE
Hali   the results of your complete blood count are within normal limits.  I am awaiting the results of the other tests and then when they become available I will give you a call  Thank you  Jack Anne M.D.

## 2023-07-28 ENCOUNTER — OFFICE VISIT (OUTPATIENT)
Dept: MATERNAL FETAL MEDICINE | Facility: CLINIC | Age: 28
End: 2023-07-28
Attending: OBSTETRICS & GYNECOLOGY
Payer: COMMERCIAL

## 2023-07-28 ENCOUNTER — HOSPITAL ENCOUNTER (OUTPATIENT)
Dept: ULTRASOUND IMAGING | Facility: CLINIC | Age: 28
Discharge: HOME | End: 2023-07-28
Attending: OBSTETRICS & GYNECOLOGY
Payer: COMMERCIAL

## 2023-07-28 ENCOUNTER — PRE VISIT (OUTPATIENT)
Dept: MATERNAL FETAL MEDICINE | Facility: CLINIC | Age: 28
End: 2023-07-28
Payer: COMMERCIAL

## 2023-07-28 VITALS — SYSTOLIC BLOOD PRESSURE: 133 MMHG | HEART RATE: 124 BPM | DIASTOLIC BLOOD PRESSURE: 92 MMHG | OXYGEN SATURATION: 98 %

## 2023-07-28 DIAGNOSIS — O13.3 GESTATIONAL HYPERTENSION, THIRD TRIMESTER: Primary | ICD-10-CM

## 2023-07-28 DIAGNOSIS — O26.90 PREGNANCY RELATED CONDITION, ANTEPARTUM: ICD-10-CM

## 2023-07-28 PROCEDURE — 84156 ASSAY OF PROTEIN URINE: CPT | Performed by: OBSTETRICS & GYNECOLOGY

## 2023-07-28 PROCEDURE — 81050 URINALYSIS VOLUME MEASURE: CPT | Performed by: OBSTETRICS & GYNECOLOGY

## 2023-07-28 PROCEDURE — 76819 FETAL BIOPHYS PROFIL W/O NST: CPT | Mod: 26 | Performed by: OBSTETRICS & GYNECOLOGY

## 2023-07-28 PROCEDURE — 76819 FETAL BIOPHYS PROFIL W/O NST: CPT

## 2023-07-28 NOTE — PROGRESS NOTES
Please refer to ultrasound report under 'Imaging' Studies of 'Chart Review' tabs.    Donald Patricio M.D.

## 2023-07-28 NOTE — NURSING NOTE
Patient reports active fetal movement, denies pain,  contractions, leaking of fluid, or bleeding.   Patient reports headache everyday. Decreased with taking Tylenol, however always there. Denies visual changes, nausea/vomiting, epigastric pain related to preeclampsia.  Education provided to patient on Biophysical profile US.  SBAR given to AGUSTINA GONZALEZ, see their note in Epic.

## 2023-07-29 ENCOUNTER — TELEPHONE (OUTPATIENT)
Dept: OBGYN | Facility: CLINIC | Age: 28
End: 2023-07-29
Payer: COMMERCIAL

## 2023-07-29 LAB
ALBUMIN MFR UR ELPH: <6 MG/DL
COLLECT DURATION TIME UR: 24 H
PROT 24H UR-MRATE: NORMAL G/(24.H)
SPECIMEN VOL UR: 3800 ML

## 2023-07-29 NOTE — TELEPHONE ENCOUNTER
Left a voicemail message for the patient inquiring how her blood pressures were doing and how she was doing and to relay the results of her acceptable laboratory data and [facility] biophysical profile and ultrasound.  I have asked that they call for any symptoms of concern.  Symptoms of concern of been thoroughly discussed with them in the past

## 2023-07-31 ENCOUNTER — HOSPITAL ENCOUNTER (INPATIENT)
Facility: CLINIC | Age: 28
LOS: 4 days | Discharge: HOME | End: 2023-08-04
Attending: OBSTETRICS & GYNECOLOGY | Admitting: OBSTETRICS & GYNECOLOGY
Payer: COMMERCIAL

## 2023-07-31 ENCOUNTER — PRENATAL OFFICE VISIT (OUTPATIENT)
Dept: OBGYN | Facility: CLINIC | Age: 28
End: 2023-07-31
Payer: COMMERCIAL

## 2023-07-31 VITALS
HEART RATE: 147 BPM | SYSTOLIC BLOOD PRESSURE: 144 MMHG | DIASTOLIC BLOOD PRESSURE: 90 MMHG | WEIGHT: 201 LBS | BODY MASS INDEX: 34.5 KG/M2

## 2023-07-31 DIAGNOSIS — O13.3 PIH (PREGNANCY INDUCED HYPERTENSION), THIRD TRIMESTER: Primary | ICD-10-CM

## 2023-07-31 DIAGNOSIS — O09.899 SUPERVISION OF OTHER HIGH RISK PREGNANCY, ANTEPARTUM: ICD-10-CM

## 2023-07-31 PROBLEM — O14.10 PREECLAMPSIA, SEVERE, UNSPECIFIED TRIMESTER: Status: ACTIVE | Noted: 2023-07-31

## 2023-07-31 LAB
ABO/RH(D): NORMAL
ALBUMIN MFR UR ELPH: 12.8 MG/DL
ALBUMIN SERPL BCG-MCNC: 3.5 G/DL (ref 3.5–5.2)
ALP SERPL-CCNC: 84 U/L (ref 35–104)
ALT SERPL W P-5'-P-CCNC: 16 U/L (ref 0–50)
ANION GAP SERPL CALCULATED.3IONS-SCNC: 12 MMOL/L (ref 7–15)
ANTIBODY SCREEN: NEGATIVE
AST SERPL W P-5'-P-CCNC: 20 U/L (ref 0–45)
BILIRUB SERPL-MCNC: 0.2 MG/DL
BUN SERPL-MCNC: 5.8 MG/DL (ref 6–20)
CALCIUM SERPL-MCNC: 8.6 MG/DL (ref 8.6–10)
CHLORIDE SERPL-SCNC: 103 MMOL/L (ref 98–107)
CREAT SERPL-MCNC: 0.52 MG/DL (ref 0.51–0.95)
CREAT UR-MCNC: 93 MG/DL
DEPRECATED HCO3 PLAS-SCNC: 19 MMOL/L (ref 22–29)
ERYTHROCYTE [DISTWIDTH] IN BLOOD BY AUTOMATED COUNT: 12.9 % (ref 10–15)
GFR SERPL CREATININE-BSD FRML MDRD: >90 ML/MIN/1.73M2
GLUCOSE SERPL-MCNC: 125 MG/DL (ref 70–99)
HCT VFR BLD AUTO: 35 % (ref 35–47)
HGB BLD-MCNC: 12.1 G/DL (ref 11.7–15.7)
MCH RBC QN AUTO: 33 PG (ref 26.5–33)
MCHC RBC AUTO-ENTMCNC: 34.6 G/DL (ref 31.5–36.5)
MCV RBC AUTO: 95 FL (ref 78–100)
PLATELET # BLD AUTO: 169 10E3/UL (ref 150–450)
POTASSIUM SERPL-SCNC: 3.8 MMOL/L (ref 3.4–5.3)
PROT SERPL-MCNC: 6 G/DL (ref 6.4–8.3)
PROT/CREAT 24H UR: 0.14 MG/MG CR (ref 0–0.2)
RBC # BLD AUTO: 3.67 10E6/UL (ref 3.8–5.2)
SODIUM SERPL-SCNC: 134 MMOL/L (ref 136–145)
SPECIMEN EXPIRATION DATE: NORMAL
WBC # BLD AUTO: 11.2 10E3/UL (ref 4–11)

## 2023-07-31 PROCEDURE — 85027 COMPLETE CBC AUTOMATED: CPT | Performed by: OBSTETRICS & GYNECOLOGY

## 2023-07-31 PROCEDURE — 86901 BLOOD TYPING SEROLOGIC RH(D): CPT | Performed by: OBSTETRICS & GYNECOLOGY

## 2023-07-31 PROCEDURE — 86850 RBC ANTIBODY SCREEN: CPT | Performed by: OBSTETRICS & GYNECOLOGY

## 2023-07-31 PROCEDURE — 250N000011 HC RX IP 250 OP 636: Mod: JZ | Performed by: OBSTETRICS & GYNECOLOGY

## 2023-07-31 PROCEDURE — 80053 COMPREHEN METABOLIC PANEL: CPT | Performed by: OBSTETRICS & GYNECOLOGY

## 2023-07-31 PROCEDURE — 120N000001 HC R&B MED SURG/OB

## 2023-07-31 PROCEDURE — 36415 COLL VENOUS BLD VENIPUNCTURE: CPT | Performed by: OBSTETRICS & GYNECOLOGY

## 2023-07-31 PROCEDURE — 250N000013 HC RX MED GY IP 250 OP 250 PS 637: Performed by: OBSTETRICS & GYNECOLOGY

## 2023-07-31 PROCEDURE — 99222 1ST HOSP IP/OBS MODERATE 55: CPT | Performed by: OBSTETRICS & GYNECOLOGY

## 2023-07-31 PROCEDURE — 99207 PR PRENATAL VISIT: CPT | Performed by: OBSTETRICS & GYNECOLOGY

## 2023-07-31 PROCEDURE — 84156 ASSAY OF PROTEIN URINE: CPT | Performed by: OBSTETRICS & GYNECOLOGY

## 2023-07-31 PROCEDURE — 87653 STREP B DNA AMP PROBE: CPT | Performed by: OBSTETRICS & GYNECOLOGY

## 2023-07-31 PROCEDURE — 258N000003 HC RX IP 258 OP 636: Performed by: OBSTETRICS & GYNECOLOGY

## 2023-07-31 RX ORDER — HYDROXYZINE HYDROCHLORIDE 50 MG/1
50 TABLET, FILM COATED ORAL
Status: DISCONTINUED | OUTPATIENT
Start: 2023-07-31 | End: 2023-08-01 | Stop reason: HOSPADM

## 2023-07-31 RX ORDER — NALOXONE HYDROCHLORIDE 0.4 MG/ML
0.4 INJECTION, SOLUTION INTRAMUSCULAR; INTRAVENOUS; SUBCUTANEOUS
Status: DISCONTINUED | OUTPATIENT
Start: 2023-07-31 | End: 2023-08-01

## 2023-07-31 RX ORDER — MAGNESIUM SULFATE HEPTAHYDRATE 40 MG/ML
4 INJECTION, SOLUTION INTRAVENOUS ONCE
Status: COMPLETED | OUTPATIENT
Start: 2023-07-31 | End: 2023-07-31

## 2023-07-31 RX ORDER — SODIUM CHLORIDE, SODIUM LACTATE, POTASSIUM CHLORIDE, CALCIUM CHLORIDE 600; 310; 30; 20 MG/100ML; MG/100ML; MG/100ML; MG/100ML
10-125 INJECTION, SOLUTION INTRAVENOUS CONTINUOUS
Status: DISCONTINUED | OUTPATIENT
Start: 2023-07-31 | End: 2023-08-04 | Stop reason: HOSPADM

## 2023-07-31 RX ORDER — NIFEDIPINE 10 MG/1
10-20 CAPSULE ORAL
Status: DISCONTINUED | OUTPATIENT
Start: 2023-07-31 | End: 2023-08-04 | Stop reason: HOSPADM

## 2023-07-31 RX ORDER — LIDOCAINE 40 MG/G
CREAM TOPICAL
Status: DISCONTINUED | OUTPATIENT
Start: 2023-07-31 | End: 2023-08-04 | Stop reason: HOSPADM

## 2023-07-31 RX ORDER — ONDANSETRON 2 MG/ML
4 INJECTION INTRAMUSCULAR; INTRAVENOUS EVERY 6 HOURS PRN
Status: DISCONTINUED | OUTPATIENT
Start: 2023-07-31 | End: 2023-08-01 | Stop reason: HOSPADM

## 2023-07-31 RX ORDER — PROCHLORPERAZINE MALEATE 10 MG
10 TABLET ORAL EVERY 6 HOURS PRN
Status: DISCONTINUED | OUTPATIENT
Start: 2023-07-31 | End: 2023-07-31 | Stop reason: HOSPADM

## 2023-07-31 RX ORDER — OXYCODONE HYDROCHLORIDE 5 MG/1
5-10 TABLET ORAL EVERY 4 HOURS PRN
Status: DISCONTINUED | OUTPATIENT
Start: 2023-07-31 | End: 2023-08-04 | Stop reason: HOSPADM

## 2023-07-31 RX ORDER — BETAMETHASONE SODIUM PHOSPHATE AND BETAMETHASONE ACETATE 3; 3 MG/ML; MG/ML
12 INJECTION, SUSPENSION INTRA-ARTICULAR; INTRALESIONAL; INTRAMUSCULAR; SOFT TISSUE EVERY 24 HOURS
Status: DISCONTINUED | OUTPATIENT
Start: 2023-07-31 | End: 2023-08-01 | Stop reason: HOSPADM

## 2023-07-31 RX ORDER — MAGNESIUM SULFATE IN WATER 40 MG/ML
1 INJECTION, SOLUTION INTRAVENOUS CONTINUOUS
Status: DISCONTINUED | OUTPATIENT
Start: 2023-07-31 | End: 2023-08-03

## 2023-07-31 RX ORDER — METOCLOPRAMIDE 10 MG/1
10 TABLET ORAL EVERY 6 HOURS PRN
Status: DISCONTINUED | OUTPATIENT
Start: 2023-07-31 | End: 2023-07-31 | Stop reason: HOSPADM

## 2023-07-31 RX ORDER — ACETAMINOPHEN 325 MG/1
975 TABLET ORAL EVERY 6 HOURS PRN
Status: DISCONTINUED | OUTPATIENT
Start: 2023-07-31 | End: 2023-08-01 | Stop reason: HOSPADM

## 2023-07-31 RX ORDER — PROCHLORPERAZINE MALEATE 10 MG
10 TABLET ORAL EVERY 6 HOURS PRN
Status: DISCONTINUED | OUTPATIENT
Start: 2023-07-31 | End: 2023-08-01 | Stop reason: HOSPADM

## 2023-07-31 RX ORDER — PRENATAL VIT/IRON FUM/FOLIC AC 27MG-0.8MG
1 TABLET ORAL DAILY
Status: DISCONTINUED | OUTPATIENT
Start: 2023-08-01 | End: 2023-08-01 | Stop reason: HOSPADM

## 2023-07-31 RX ORDER — NALOXONE HYDROCHLORIDE 0.4 MG/ML
0.2 INJECTION, SOLUTION INTRAMUSCULAR; INTRAVENOUS; SUBCUTANEOUS
Status: DISCONTINUED | OUTPATIENT
Start: 2023-07-31 | End: 2023-08-01

## 2023-07-31 RX ORDER — ACETAMINOPHEN 325 MG/1
650 TABLET ORAL EVERY 4 HOURS PRN
Status: DISCONTINUED | OUTPATIENT
Start: 2023-07-31 | End: 2023-07-31

## 2023-07-31 RX ORDER — ONDANSETRON 4 MG/1
4 TABLET, ORALLY DISINTEGRATING ORAL EVERY 6 HOURS PRN
Status: DISCONTINUED | OUTPATIENT
Start: 2023-07-31 | End: 2023-08-01 | Stop reason: HOSPADM

## 2023-07-31 RX ORDER — LABETALOL HYDROCHLORIDE 5 MG/ML
20 INJECTION, SOLUTION INTRAVENOUS
Status: DISCONTINUED | OUTPATIENT
Start: 2023-07-31 | End: 2023-08-04 | Stop reason: HOSPADM

## 2023-07-31 RX ORDER — ONDANSETRON 2 MG/ML
4 INJECTION INTRAMUSCULAR; INTRAVENOUS EVERY 6 HOURS PRN
Status: DISCONTINUED | OUTPATIENT
Start: 2023-07-31 | End: 2023-07-31 | Stop reason: HOSPADM

## 2023-07-31 RX ORDER — METOCLOPRAMIDE 10 MG/1
10 TABLET ORAL EVERY 6 HOURS PRN
Status: DISCONTINUED | OUTPATIENT
Start: 2023-07-31 | End: 2023-08-01 | Stop reason: HOSPADM

## 2023-07-31 RX ORDER — ONDANSETRON 4 MG/1
4 TABLET, ORALLY DISINTEGRATING ORAL EVERY 6 HOURS PRN
Status: DISCONTINUED | OUTPATIENT
Start: 2023-07-31 | End: 2023-07-31 | Stop reason: HOSPADM

## 2023-07-31 RX ORDER — METOCLOPRAMIDE HYDROCHLORIDE 5 MG/ML
10 INJECTION INTRAMUSCULAR; INTRAVENOUS EVERY 6 HOURS PRN
Status: DISCONTINUED | OUTPATIENT
Start: 2023-07-31 | End: 2023-07-31 | Stop reason: HOSPADM

## 2023-07-31 RX ORDER — PROCHLORPERAZINE 25 MG
25 SUPPOSITORY, RECTAL RECTAL EVERY 12 HOURS PRN
Status: DISCONTINUED | OUTPATIENT
Start: 2023-07-31 | End: 2023-07-31 | Stop reason: HOSPADM

## 2023-07-31 RX ORDER — PROCHLORPERAZINE 25 MG
25 SUPPOSITORY, RECTAL RECTAL EVERY 12 HOURS PRN
Status: DISCONTINUED | OUTPATIENT
Start: 2023-07-31 | End: 2023-08-01 | Stop reason: HOSPADM

## 2023-07-31 RX ORDER — METOCLOPRAMIDE HYDROCHLORIDE 5 MG/ML
10 INJECTION INTRAMUSCULAR; INTRAVENOUS EVERY 6 HOURS PRN
Status: DISCONTINUED | OUTPATIENT
Start: 2023-07-31 | End: 2023-08-01 | Stop reason: HOSPADM

## 2023-07-31 RX ORDER — ACETAMINOPHEN 325 MG/1
325-650 TABLET ORAL EVERY 6 HOURS PRN
COMMUNITY
End: 2023-10-09

## 2023-07-31 RX ADMIN — BETAMETHASONE SODIUM PHOSPHATE AND BETAMETHASONE ACETATE 12 MG: 3; 3 INJECTION, SUSPENSION INTRA-ARTICULAR; INTRALESIONAL; INTRAMUSCULAR at 18:32

## 2023-07-31 RX ADMIN — MAGNESIUM SULFATE HEPTAHYDRATE 4 G: 4 INJECTION, SOLUTION INTRAVENOUS at 18:53

## 2023-07-31 RX ADMIN — SODIUM CHLORIDE, POTASSIUM CHLORIDE, SODIUM LACTATE AND CALCIUM CHLORIDE 75 ML/HR: 600; 310; 30; 20 INJECTION, SOLUTION INTRAVENOUS at 19:34

## 2023-07-31 RX ADMIN — MAGNESIUM SULFATE IN WATER 2 G/HR: 40 INJECTION, SOLUTION INTRAVENOUS at 19:28

## 2023-07-31 RX ADMIN — OXYCODONE HYDROCHLORIDE 5 MG: 5 TABLET ORAL at 21:15

## 2023-07-31 RX ADMIN — ACETAMINOPHEN 975 MG: 325 TABLET, FILM COATED ORAL at 18:59

## 2023-07-31 RX ADMIN — HYDROXYZINE HYDROCHLORIDE 50 MG: 50 TABLET, FILM COATED ORAL at 21:15

## 2023-07-31 ASSESSMENT — ACTIVITIES OF DAILY LIVING (ADL)
ADLS_ACUITY_SCORE: 18
ADLS_ACUITY_SCORE: 18
ADLS_ACUITY_SCORE: 31
ADLS_ACUITY_SCORE: 35
ADLS_ACUITY_SCORE: 31

## 2023-07-31 NOTE — NURSING NOTE
"Chief Complaint   Patient presents with    Prenatal Care     34        Initial BP (!) 144/90   Pulse (!) 147   Wt 91.2 kg (201 lb)   LMP 2022 (Exact Date)   BMI 34.50 kg/m   Estimated body mass index is 34.5 kg/m  as calculated from the following:    Height as of 23: 1.626 m (5' 4\").    Weight as of this encounter: 91.2 kg (201 lb).  BP completed using cuff size: regular    Questioned patient about current smoking habits.  Pt. has never smoked.          The following HM Due: NONE      The following patient reported/Care Every where data was sent to:  P ABSTRACT QUALITY INITIATIVES [[id number]]        Yandy Bermudez [facility]                 "

## 2023-07-31 NOTE — PATIENT INSTRUCTIONS
You can reach your [place] Care Team any time of the day by calling [phone]. This number will put you in touch with the 24 hour nurse line if the clinic is closed.    To contact your OB/GYN Station Coordinator/Surgery Scheduler please call [phone]. This is a direct number for your care team between 8 a.m. and 4 p.m. Monday through Friday.    [facility] is open for your convenience:  Monday through Friday 8 a.m. to 6 p.m.  Closed weekends and all major holidays.

## 2023-07-31 NOTE — PROGRESS NOTES
Echo Mcgovern is a 28 year old female, 34w4d, Estimated Date of Delivery: Sep 7, 2023 who presents for prenatal care.  Good fetal movement.  The patient states that if she maintains strict bedrest on her left side that her blood pressure will be 130/80.  Any movement or activity is greater than 140/90.  The patient continues to have now continuous headache unrelieved with Tylenol 975 mg every 6 hours.  She denies visual change or right upper quadrant discomfort.  No signs or symptoms of labor or vaginal bleeding    A/P: Intrauterine pregnancy 34-4/7 weeks gestation preeclampsia with each of her 2 previous pregnancies including a  induction of labor.  Send the patient to labor and delivery I will recheck her PIH labs with appropriate therapy based on the findings.  Plan reviewed with the patient and her

## 2023-07-31 NOTE — H&P
[facility] Labor and Delivery History and Physical    Echo Mcgovern MRN# [id number]   Age: 28 year old YOB: 1995     Date of Admission:  2023    Primary care provider: No Ref-Primary, Physician           Chief Complaint:   Echo Mcgovern is a 28 year old white female  Estimated Date of Delivery: Sep 7, 2023 Blood group A Rh+ rubella immune  GBS carrier  NIPS 46XX  1 HR BS 90 who is 34w4d pregnant and being admitted for induction of labor, indication preeclampsia with severe features based on an intractable headache unrelieved with appropriate doses of tylenol.  The pt's 2 previous pregnancies have been complicated with preeclampsia requiring IOL at 36.5 - 37 weeks gestation. The pt states that she has some mild visual changes that are intermittent but denies RUQ discomfort.  The pt has been off work, home on BR with BRP'S.  When she is at rest on her left side she states her BP's are 130'/80's.  If she is up at all her BP increase up to 150's/90's.  She has been evaluated multiple times in the recent weeks for gHTN without severe features and been managed conservatively.          Pregnancy history:     OBSTETRIC HISTORY:    OB History    Para Term  AB Living   3 2 1 1 0 2   SAB IAB Ectopic Multiple Live Births   0 0 0 0 2      # Outcome Date GA Lbr Gregor/2nd Weight Sex Delivery Anes PTL Lv   3 Current            2  18 36w5d 12:35 / 00:16 2.82 kg (6 lb 3.5 oz) M Vag-Spont EPI N LUCINDA      Complications: Preeclampsia/Hypertension      Name: CHRIS MCGOVERN      Apgar1: 8  Apgar5: 9   1 Term 16 37w0d  2.495 kg (5 lb 8 oz) F    LUCINDA      Complications: Preeclampsia      Name: Taylor      Obstetric Comments   H/o Preeclampsia 1st pregnancy, IOL @ 37 wks.    Diagnosed preeclampsia with with her second pregnancy   Treated with Magnesium       EDC: Estimated Date of Delivery: 23    Prenatal Labs:   Lab Results   Component  Value Date    ABO A 09/11/2018    RH Pos 09/11/2018    AS Negative 02/02/2023    HEPBANG Nonreactive 02/02/2023    CHPCRT Negative 02/13/2023    GCPCRT Negative 02/13/2023    TREPAB Negative 03/01/2018    HGB 12.1 07/31/2023       GBS Status:   No results found for: GBS    Active Problem List  Patient Active Problem List   Diagnosis    Supervision of other high risk pregnancy, antepartum    ASCUS of cervix with negative high risk HPV    Mild episode of recurrent major depressive disorder (H)    Anxiety    Health examination of defined subpopulation    Encounter for preventive health examination    Encounter for triage in pregnant patient    PIH (pregnancy induced hypertension), third trimester    Indication for care in labor or delivery       Medication Prior to Admission  Medications Prior to Admission   Medication Sig Dispense Refill Last Dose    acetaminophen (TYLENOL) 325 MG tablet Take 325-650 mg by mouth every 6 hours as needed for mild pain   7/31/2023    aspirin 81 MG EC tablet Take 81 mg by mouth daily   7/31/2023    Prenatal MV & Min w/FA-DHA ([facility])    7/31/2023   .        Maternal Past Medical History:     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 Streptococcus     H/O pre-eclampsia in prior pregnancy, currently pregnant     Preeclampsia     with both pregnancy    Varicella     as a child    Wounds and injuries     back injury, broke hand falling off jet when in the                        Family History:   I have reviewed this patient's family history            Social History:   I have reviewed this patient's social history         Review of Systems:   CONSTITUTIONAL: NEGATIVE for fever, chills, change in weight  ENT/MOUTH: NEGATIVE for ear, mouth and throat problems  RESP: NEGATIVE for significant cough or SOB  CV: NEGATIVE for chest pain, palpitations or peripheral edema          Physical Exam:    Vitals were reviewed  All vitals stable      BP: 135/86              Constitutional:   awake, alert, cooperative, mild distress, and appears stated age     Eyes:   Lids and lashes normal, pupils equal, round and reactive to light, extra ocular muscles intact, sclera clear, conjunctiva normal     ENT:   Normocephalic, without obvious abnormality, atraumatic, sinuses nontender on palpation, external ears without lesions, oral pharynx with moist mucous membranes, tonsils without erythema or exudates, gums normal and good dentition.     Neck:   Supple, symmetrical, trachea midline, no adenopathy, thyroid symmetric, not enlarged and no tenderness, skin normal     Back:   Symmetric, no curvature, spinous processes are non-tender on palpation, paraspinous muscles are non-tender on palpation, no costal vertebral tenderness     Lungs:   No increased work of breathing, good air exchange, clear to auscultation bilaterally, no crackles or wheezing     Cardiovascular:   Normal apical impulse, regular rate and rhythm, normal S1 and S2, no S3 or S4, and no murmur noted     Abdomen:   No scars, normal bowel sounds, soft, gravid uterus consistent with dates, guthrie infant , vtx  EFW 4 # non-tender, no masses palpated, no hepatosplenomegally     Genitounirinary:   External Genitalia:  General appearance; normal     Neurologic:   Awake, alert, oriented to name, place and time.  Cranial nerves II-XII are grossly intact.  Motor is 5 out of 5 bilaterally.     Sensory is intact.   DTR's 2+ symmetrical no clonus, and gait is normal.      Cervix:   Membranes: intact   Dilation: closed   Effacement: 50%   Station:FLOATING   Consistency: average   Position: Posterior  Presentation:Cephalic  Fetal Heart Rate Tracing: Tier 1 (normal)      labs       Component      Latest Ref Rng 7/31/2023  3:12 PM 7/31/2023  3:33 PM   Sodium      136 - 145 mmol/L 134 (L)     Potassium      3.4 - 5.3 mmol/L 3.8     Chloride      98 - 107 mmol/L 103      Carbon Dioxide (CO2)      22 - 29 mmol/L 19 (L)     Anion Gap      7 - 15 mmol/L 12     Urea Nitrogen      6.0 - 20.0 mg/dL 5.8 (L)     Creatinine      0.51 - 0.95 mg/dL 0.52     Calcium      8.6 - 10.0 mg/dL 8.6     Glucose      70 - 99 mg/dL 125 (H)     Alkaline Phosphatase      35 - 104 U/L 84     AST      0 - 45 U/L 20     ALT      0 - 50 U/L 16     Protein Total      6.4 - 8.3 g/dL 6.0 (L)     Albumin      3.5 - 5.2 g/dL 3.5     Bilirubin Total      <=1.2 mg/dL 0.2     GFR Estimate      >60 mL/min/1.73m2 >90     WBC      4.0 - 11.0 10e3/uL 11.2 (H)     RBC Count      3.80 - 5.20 10e6/uL 3.67 (L)     Hemoglobin      11.7 - 15.7 g/dL 12.1     Hematocrit      35.0 - 47.0 % 35.0     MCV      78 - 100 fL 95     MCH      26.5 - 33.0 pg 33.0     MCHC      31.5 - 36.5 g/dL 34.6     RDW      10.0 - 15.0 % 12.9     Platelet Count      150 - 450 10e3/uL 169     Total Protein Urine mg/dL        mg/dL  12.8    Total Protein UR MG/MG CR      0.00 - 0.20 mg/mg Cr  0.14    Creatinine Urine      mg/dL  93.0       Legend:  (L) Low  (H) High             Assessment:   Echo Mcgovern is a 34w4d pregnant female admitted with induction of labor, indication gHTN with preeclampsia with severe features based on intractable headache not relieved with appropriate analgesics.  Labs are stable will repeat in am.  Pt has an M  testing appointment tomorrow am.  Will give BMZ and start magnesium sulfate tonight  will monitor closely.  Will begin cytotec when BMZ in or if condition deteriorates.  Pt states she is a GBS carrier but I do not see a pos culture.  Pt is 34.4 weeks so she will receive abx per protocal.  Pt was discussed with M and the above plan formulated.  Risks, benefits, and alternative modes of therapy discussed at length. Pathophysiology of the disease process reviewed, all of the patients questions answered and informed consent obtained.            Plan:   Admit - see IP orders    Jack Anne MD

## 2023-07-31 NOTE — CARE PLAN
Data: [facility]: 2023  2:46 PM.  Reason for maternal/fetal assessment is elevated blood pressures. Patient was sent over from clinic for pre-E eval.  Patient is a .  Prenatal record reviewed. Pregnancy  has been complicated by has been uncomplicated.  Gestational Age 34w4d. VSS. Fetal movement present. Patient denies uterine contractions, leaking of vaginal fluid/rupture of membranes, vaginal bleeding, abdominal pain, pelvic pressure, nausea, vomiting, visual disturbances, epigastric or RUQ pain, significant edema. Support person is present.   Action: Verbal consent for EFM. Triage assessment completed. Bill of rights reviewed.  Response: Patient verbalized agreement with plan. Will contact Dr Jack Anne with update and further orders.     Dr. Anne called pt in and gave TORB for pre-E labs and serial Bps. Will update MD.

## 2023-08-01 ENCOUNTER — ANESTHESIA EVENT (OUTPATIENT)
Dept: OBGYN | Facility: CLINIC | Age: 28
End: 2023-08-01
Payer: COMMERCIAL

## 2023-08-01 ENCOUNTER — ANESTHESIA (OUTPATIENT)
Dept: OBGYN | Facility: CLINIC | Age: 28
End: 2023-08-01
Payer: COMMERCIAL

## 2023-08-01 LAB
ALT SERPL W P-5'-P-CCNC: 15 U/L (ref 0–50)
AST SERPL W P-5'-P-CCNC: 18 U/L (ref 0–45)
CREAT SERPL-MCNC: 0.51 MG/DL (ref 0.51–0.95)
GFR SERPL CREATININE-BSD FRML MDRD: >90 ML/MIN/1.73M2
GP B STREP DNA SPEC QL NAA+PROBE: NEGATIVE
HGB BLD-MCNC: 12.9 G/DL (ref 11.7–15.7)
PLATELET # BLD AUTO: 173 10E3/UL (ref 150–450)

## 2023-08-01 PROCEDURE — 250N000011 HC RX IP 250 OP 636: Performed by: OBSTETRICS & GYNECOLOGY

## 2023-08-01 PROCEDURE — 99231 SBSQ HOSP IP/OBS SF/LOW 25: CPT | Performed by: NURSE PRACTITIONER

## 2023-08-01 PROCEDURE — 250N000013 HC RX MED GY IP 250 OP 250 PS 637: Performed by: OBSTETRICS & GYNECOLOGY

## 2023-08-01 PROCEDURE — 250N000011 HC RX IP 250 OP 636: Mod: JZ | Performed by: OBSTETRICS & GYNECOLOGY

## 2023-08-01 PROCEDURE — 258N000003 HC RX IP 258 OP 636: Performed by: OBSTETRICS & GYNECOLOGY

## 2023-08-01 PROCEDURE — 85018 HEMOGLOBIN: CPT | Performed by: OBSTETRICS & GYNECOLOGY

## 2023-08-01 PROCEDURE — 370N000003 HC ANESTHESIA WARD SERVICE: Performed by: ANESTHESIOLOGY

## 2023-08-01 PROCEDURE — 120N000001 HC R&B MED SURG/OB

## 2023-08-01 PROCEDURE — 250N000009 HC RX 250: Performed by: OBSTETRICS & GYNECOLOGY

## 2023-08-01 PROCEDURE — 84460 ALANINE AMINO (ALT) (SGPT): CPT | Performed by: OBSTETRICS & GYNECOLOGY

## 2023-08-01 PROCEDURE — 85049 AUTOMATED PLATELET COUNT: CPT | Performed by: OBSTETRICS & GYNECOLOGY

## 2023-08-01 PROCEDURE — 84450 TRANSFERASE (AST) (SGOT): CPT | Performed by: OBSTETRICS & GYNECOLOGY

## 2023-08-01 PROCEDURE — 36415 COLL VENOUS BLD VENIPUNCTURE: CPT | Performed by: OBSTETRICS & GYNECOLOGY

## 2023-08-01 PROCEDURE — 82565 ASSAY OF CREATININE: CPT | Performed by: OBSTETRICS & GYNECOLOGY

## 2023-08-01 RX ORDER — KETOROLAC TROMETHAMINE 30 MG/ML
30 INJECTION, SOLUTION INTRAMUSCULAR; INTRAVENOUS
Status: DISCONTINUED | OUTPATIENT
Start: 2023-08-01 | End: 2023-08-04 | Stop reason: HOSPADM

## 2023-08-01 RX ORDER — OXYTOCIN/0.9 % SODIUM CHLORIDE 30/500 ML
1-24 PLASTIC BAG, INJECTION (ML) INTRAVENOUS CONTINUOUS
Status: DISCONTINUED | OUTPATIENT
Start: 2023-08-01 | End: 2023-08-02 | Stop reason: HOSPADM

## 2023-08-01 RX ORDER — METOCLOPRAMIDE HYDROCHLORIDE 5 MG/ML
10 INJECTION INTRAMUSCULAR; INTRAVENOUS EVERY 6 HOURS PRN
Status: DISCONTINUED | OUTPATIENT
Start: 2023-08-01 | End: 2023-08-02 | Stop reason: HOSPADM

## 2023-08-01 RX ORDER — CITRIC ACID/SODIUM CITRATE 334-500MG
30 SOLUTION, ORAL ORAL
Status: DISCONTINUED | OUTPATIENT
Start: 2023-08-01 | End: 2023-08-02 | Stop reason: HOSPADM

## 2023-08-01 RX ORDER — ACETAMINOPHEN 325 MG/1
975 TABLET ORAL EVERY 6 HOURS PRN
Status: DISCONTINUED | OUTPATIENT
Start: 2023-08-01 | End: 2023-08-03

## 2023-08-01 RX ORDER — ONDANSETRON 2 MG/ML
4 INJECTION INTRAMUSCULAR; INTRAVENOUS EVERY 6 HOURS PRN
Status: DISCONTINUED | OUTPATIENT
Start: 2023-08-01 | End: 2023-08-02 | Stop reason: HOSPADM

## 2023-08-01 RX ORDER — NALOXONE HYDROCHLORIDE 0.4 MG/ML
0.4 INJECTION, SOLUTION INTRAMUSCULAR; INTRAVENOUS; SUBCUTANEOUS
Status: DISCONTINUED | OUTPATIENT
Start: 2023-08-01 | End: 2023-08-02 | Stop reason: HOSPADM

## 2023-08-01 RX ORDER — OXYTOCIN 10 [USP'U]/ML
10 INJECTION, SOLUTION INTRAMUSCULAR; INTRAVENOUS
Status: DISCONTINUED | OUTPATIENT
Start: 2023-08-01 | End: 2023-08-02 | Stop reason: HOSPADM

## 2023-08-01 RX ORDER — FENTANYL CITRATE-0.9 % NACL/PF 10 MCG/ML
100 PLASTIC BAG, INJECTION (ML) INTRAVENOUS EVERY 5 MIN PRN
Status: DISCONTINUED | OUTPATIENT
Start: 2023-08-01 | End: 2023-08-02 | Stop reason: HOSPADM

## 2023-08-01 RX ORDER — MISOPROSTOL 100 UG/1
25 TABLET ORAL
Status: DISCONTINUED | OUTPATIENT
Start: 2023-08-01 | End: 2023-08-02 | Stop reason: HOSPADM

## 2023-08-01 RX ORDER — FENTANYL CITRATE 50 UG/ML
100 INJECTION, SOLUTION INTRAMUSCULAR; INTRAVENOUS
Status: DISCONTINUED | OUTPATIENT
Start: 2023-08-01 | End: 2023-08-02 | Stop reason: HOSPADM

## 2023-08-01 RX ORDER — ONDANSETRON 4 MG/1
4 TABLET, ORALLY DISINTEGRATING ORAL EVERY 6 HOURS PRN
Status: DISCONTINUED | OUTPATIENT
Start: 2023-08-01 | End: 2023-08-02 | Stop reason: HOSPADM

## 2023-08-01 RX ORDER — METHYLERGONOVINE MALEATE 0.2 MG/ML
200 INJECTION INTRAVENOUS
Status: DISCONTINUED | OUTPATIENT
Start: 2023-08-01 | End: 2023-08-02 | Stop reason: HOSPADM

## 2023-08-01 RX ORDER — SODIUM CHLORIDE, SODIUM LACTATE, POTASSIUM CHLORIDE, CALCIUM CHLORIDE 600; 310; 30; 20 MG/100ML; MG/100ML; MG/100ML; MG/100ML
INJECTION, SOLUTION INTRAVENOUS CONTINUOUS PRN
Status: DISCONTINUED | OUTPATIENT
Start: 2023-08-01 | End: 2023-08-01

## 2023-08-01 RX ORDER — OXYTOCIN/0.9 % SODIUM CHLORIDE 30/500 ML
340 PLASTIC BAG, INJECTION (ML) INTRAVENOUS CONTINUOUS PRN
Status: DISCONTINUED | OUTPATIENT
Start: 2023-08-01 | End: 2023-08-02 | Stop reason: HOSPADM

## 2023-08-01 RX ORDER — PROCHLORPERAZINE MALEATE 10 MG
10 TABLET ORAL EVERY 6 HOURS PRN
Status: DISCONTINUED | OUTPATIENT
Start: 2023-08-01 | End: 2023-08-02 | Stop reason: HOSPADM

## 2023-08-01 RX ORDER — OXYTOCIN/0.9 % SODIUM CHLORIDE 30/500 ML
100-340 PLASTIC BAG, INJECTION (ML) INTRAVENOUS CONTINUOUS PRN
Status: DISCONTINUED | OUTPATIENT
Start: 2023-08-01 | End: 2023-08-04 | Stop reason: HOSPADM

## 2023-08-01 RX ORDER — PROCHLORPERAZINE 25 MG
25 SUPPOSITORY, RECTAL RECTAL EVERY 12 HOURS PRN
Status: DISCONTINUED | OUTPATIENT
Start: 2023-08-01 | End: 2023-08-02 | Stop reason: HOSPADM

## 2023-08-01 RX ORDER — MISOPROSTOL 200 UG/1
400 TABLET ORAL
Status: DISCONTINUED | OUTPATIENT
Start: 2023-08-01 | End: 2023-08-02 | Stop reason: HOSPADM

## 2023-08-01 RX ORDER — NALOXONE HYDROCHLORIDE 0.4 MG/ML
0.2 INJECTION, SOLUTION INTRAMUSCULAR; INTRAVENOUS; SUBCUTANEOUS
Status: DISCONTINUED | OUTPATIENT
Start: 2023-08-01 | End: 2023-08-02 | Stop reason: HOSPADM

## 2023-08-01 RX ORDER — TRANEXAMIC ACID 10 MG/ML
1 INJECTION, SOLUTION INTRAVENOUS EVERY 30 MIN PRN
Status: DISCONTINUED | OUTPATIENT
Start: 2023-08-01 | End: 2023-08-02 | Stop reason: HOSPADM

## 2023-08-01 RX ORDER — CARBOPROST TROMETHAMINE 250 UG/ML
250 INJECTION, SOLUTION INTRAMUSCULAR
Status: DISCONTINUED | OUTPATIENT
Start: 2023-08-01 | End: 2023-08-02 | Stop reason: HOSPADM

## 2023-08-01 RX ORDER — PENICILLIN G POTASSIUM 5000000 [IU]/1
5 INJECTION, POWDER, FOR SOLUTION INTRAMUSCULAR; INTRAVENOUS ONCE
Status: COMPLETED | OUTPATIENT
Start: 2023-08-01 | End: 2023-08-01

## 2023-08-01 RX ORDER — PENICILLIN G 3000000 [IU]/50ML
3 INJECTION, SOLUTION INTRAVENOUS EVERY 4 HOURS
Status: DISCONTINUED | OUTPATIENT
Start: 2023-08-01 | End: 2023-08-01

## 2023-08-01 RX ORDER — IBUPROFEN 800 MG/1
800 TABLET, FILM COATED ORAL
Status: DISCONTINUED | OUTPATIENT
Start: 2023-08-01 | End: 2023-08-04 | Stop reason: HOSPADM

## 2023-08-01 RX ORDER — MISOPROSTOL 200 UG/1
800 TABLET ORAL
Status: DISCONTINUED | OUTPATIENT
Start: 2023-08-01 | End: 2023-08-02 | Stop reason: HOSPADM

## 2023-08-01 RX ORDER — NALBUPHINE HYDROCHLORIDE 20 MG/ML
2.5-5 INJECTION, SOLUTION INTRAMUSCULAR; INTRAVENOUS; SUBCUTANEOUS EVERY 6 HOURS PRN
Status: DISCONTINUED | OUTPATIENT
Start: 2023-08-01 | End: 2023-08-04 | Stop reason: HOSPADM

## 2023-08-01 RX ORDER — METOCLOPRAMIDE 10 MG/1
10 TABLET ORAL EVERY 6 HOURS PRN
Status: DISCONTINUED | OUTPATIENT
Start: 2023-08-01 | End: 2023-08-02 | Stop reason: HOSPADM

## 2023-08-01 RX ORDER — TERBUTALINE SULFATE 1 MG/ML
0.25 INJECTION, SOLUTION SUBCUTANEOUS
Status: DISCONTINUED | OUTPATIENT
Start: 2023-08-01 | End: 2023-08-02

## 2023-08-01 RX ORDER — OXYTOCIN 10 [USP'U]/ML
10 INJECTION, SOLUTION INTRAMUSCULAR; INTRAVENOUS
Status: DISCONTINUED | OUTPATIENT
Start: 2023-08-01 | End: 2023-08-04 | Stop reason: HOSPADM

## 2023-08-01 RX ORDER — BETAMETHASONE SODIUM PHOSPHATE AND BETAMETHASONE ACETATE 3; 3 MG/ML; MG/ML
12 INJECTION, SUSPENSION INTRA-ARTICULAR; INTRALESIONAL; INTRAMUSCULAR; SOFT TISSUE ONCE
Status: COMPLETED | OUTPATIENT
Start: 2023-08-01 | End: 2023-08-01

## 2023-08-01 RX ADMIN — Medication 14 MILLI-UNITS/MIN: at 21:11

## 2023-08-01 RX ADMIN — Medication 6 MILLI-UNITS/MIN: at 17:27

## 2023-08-01 RX ADMIN — MISOPROSTOL 25 MCG: 100 TABLET ORAL at 12:00

## 2023-08-01 RX ADMIN — Medication 12 MILLI-UNITS/MIN: at 20:19

## 2023-08-01 RX ADMIN — Medication 18 MILLI-UNITS/MIN: at 22:45

## 2023-08-01 RX ADMIN — Medication 16 MILLI-UNITS/MIN: at 22:11

## 2023-08-01 RX ADMIN — MAGNESIUM SULFATE IN WATER 2 G/HR: 40 INJECTION, SOLUTION INTRAVENOUS at 13:31

## 2023-08-01 RX ADMIN — Medication 8 MILLI-UNITS/MIN: at 18:08

## 2023-08-01 RX ADMIN — BETAMETHASONE SODIUM PHOSPHATE AND BETAMETHASONE ACETATE 12 MG: 3; 3 INJECTION, SUSPENSION INTRA-ARTICULAR; INTRALESIONAL; INTRAMUSCULAR at 19:00

## 2023-08-01 RX ADMIN — MISOPROSTOL 25 MCG: 100 TABLET ORAL at 08:00

## 2023-08-01 RX ADMIN — ACETAMINOPHEN 975 MG: 325 TABLET, FILM COATED ORAL at 19:42

## 2023-08-01 RX ADMIN — PENICILLIN G POTASSIUM 5 MILLION UNITS: 5000000 POWDER, FOR SOLUTION INTRAMUSCULAR; INTRAPLEURAL; INTRATHECAL; INTRAVENOUS at 14:18

## 2023-08-01 RX ADMIN — ACETAMINOPHEN 975 MG: 325 TABLET, FILM COATED ORAL at 02:09

## 2023-08-01 RX ADMIN — MISOPROSTOL 25 MCG: 100 TABLET ORAL at 10:00

## 2023-08-01 RX ADMIN — Medication 2 MILLI-UNITS/MIN: at 14:30

## 2023-08-01 RX ADMIN — SODIUM CHLORIDE, POTASSIUM CHLORIDE, SODIUM LACTATE AND CALCIUM CHLORIDE 75 ML/HR: 600; 310; 30; 20 INJECTION, SOLUTION INTRAVENOUS at 05:54

## 2023-08-01 RX ADMIN — Medication 4 MILLI-UNITS/MIN: at 16:16

## 2023-08-01 RX ADMIN — Medication 10 MILLI-UNITS/MIN: at 19:43

## 2023-08-01 RX ADMIN — SODIUM CHLORIDE, POTASSIUM CHLORIDE, SODIUM LACTATE AND CALCIUM CHLORIDE 40 ML/HR: 600; 310; 30; 20 INJECTION, SOLUTION INTRAVENOUS at 19:44

## 2023-08-01 ASSESSMENT — ACTIVITIES OF DAILY LIVING (ADL)
ADLS_ACUITY_SCORE: 18

## 2023-08-01 NOTE — CARE PLAN
Data: Blood pressures within parameters. Signs and symptoms of pre-eclampsia HA. Fetal assessment Reactive.  Interventions: Monitor blood pressures and indicators of pre-eclampsia every 4 hours. Continue uterine/fetal assessment done. Activity level Bed rest with bathroom privileges. Preventive measures include Medications. Encourage active range of motion and frequent position changes.  Plan: Continue expectant management. Observe for and notify care provider of indicators of worsening pre-eclampsia or signs of fetal/maternal compromise.

## 2023-08-01 NOTE — PROGRESS NOTES
Intrapartum Progress Note    S: HA is still 6/10.  Feels a bit woozy and hot from the magnesium.  Not feeling much in the way of contractions yet.     O:  Vitals:    23 0751 23 0754 23 1000 23 1200   BP:  117/77 128/83 119/75   Pulse:       Resp:   16 16   Temp:       TempSrc:       SpO2: 97% 100% 100% 100%   Weight:       Height:         General: comfortable, lying in bed with fan  Cervix: 1.5/40/-3 soft posterior    GBS pending    A/P: 28 year old  @ 34w5d here for IOL for gHTN with severe intractable HA, now considered a severe feature, and undergoing IOL and BMZ.    - severe gHTN/HA: normal HELLP labs this AM.  HA persists despite APAP, compazine, roxicodone.  +mild nausea.  On magnesium.  BP WNL.  BMZ #2 due tonight at 6 PM  - Labor: s/p cytotec x3, now hernández of 5 but naomie painfully and frequently, plan to transition to Pitocin now, will recheck cervix in 4 hours or sooner if becoming uncomfortable enough for pain management.  Consider AROM at that time  - Pain management: Nothing for now  - GBS pending, start PCN now with Pitocin and can turn off if returns negative prior to delivery  - Anticipate     Shanice Farrar MD  [facility]

## 2023-08-01 NOTE — PROVIDER NOTIFICATION
08/01/23 0005   Provider Notification   Provider Name/Title Dr. Anne   Method of Notification Phone   Request Evaluate - Remote   Notification Reason Fetal Baseline Change     Fetal baseline last hour has been 100-105 bpm, moderate variability with accels present. Fetal baseline previously 120-130 bpm. Pt did receive Oxy and Atarax, MD states this could be contributing to baseline change. Continue to monitor and if FHT's change in variability then notify MD. Pt also naomie every 3-8 minutes, pt feels occasional slight cramp, not palpable and able to sleep through them. MD states if pt went into labor we would let her labor since plan is to deliver.

## 2023-08-01 NOTE — PROVIDER NOTIFICATION
07/31/23 2027   Provider Notification   Provider Name/Title Dr. Anne   Method of Notification In Department   Request Evaluate-Remote   Notification Reason Other  (headache pain)     Pt continues to c/o persistent headache that is unchanged after Tylenol. MD gave VORB for 5-10 mg PO oxy Q4h PRN.

## 2023-08-01 NOTE — PROVIDER NOTIFICATION
08/01/23 1354   Provider Notification   Provider Name/Title Dr Malachi Farrar   Method of Notification Phone   Request Evaluate - Remote   Notification Reason Status Update     Dr updated re: contraction pattern every 2-5 minutes, mild to moderate in strength, patient able to talk through contractions but does note they have gradually felt stronger throughout the day. SVE 1.5/50/-3, soft, middle position. Orders to discontinue cytotec and start pitocin induction. Orders for PCN for GBS unknown. Plan to reevaluate SVE in 4 hours or sooner if indicated.

## 2023-08-01 NOTE — PROVIDER NOTIFICATION
08/01/23 0633   Provider Notification   Provider Name/Title Dr. Anne   Method of Notification At Bedside   Request Evaluate in Person   Notification Reason Other (Comment)     MD at bedside to review POC. Explained plan to start cervical ripening this AM to make cervix more favorable. Reviewed benefits to alleviating pre-e symptoms by delivering. All questions answered. Will pass POC along to following provider. No new orders at this time.

## 2023-08-01 NOTE — PROVIDER NOTIFICATION
08/01/23 0719   Provider Notification   Provider Name/Title Dr. Anne   Method of Notification In Department   Request Evaluate - Remote   Notification Reason Status Update     Dr. Anne gave TORB for PO Cytotec to be started this AM.  [facility] Report given to REBECCA Gonzalez

## 2023-08-01 NOTE — PLAN OF CARE
Goal Outcome Evaluation:  Data: Patient admitted to room 409 at 1800. Patient is a . Prenatal record reviewed.   Pregnnacy has been complicated by H/O Pre-E with previous 2 pregnancies.   OB History    Para Term  AB Living   3 2 1 1 0 2   SAB IAB Ectopic Multiple Live Births   0 0 0 0 2      # Outcome Date GA Lbr Gregor/2nd Weight Sex Delivery Anes PTL Lv   3 Current            2  18 36w5d 12:35 / 00:16 2.82 kg (6 lb 3.5 oz) M Vag-Spont EPI N LUCINDA      Complications: Preeclampsia/Hypertension      Name: HEATHERBABYLincoln CARRILLO      Apgar1: 8  Apgar5: 9   1 Term 16 37w0d  2.495 kg (5 lb 8 oz) F    LUCINDA      Complications: Preeclampsia      Name: Taylor      Obstetric Comments   H/o Preeclampsia 1st pregnancy, IOL @ 37 wks.    Diagnosed preeclampsia with with her second pregnancy   Treated with Magnesium   .  Medical History:   Past Medical History:   Diagnosis Date    Abnormal Pap smear of cervix 2016    see problem list    Carrier of group B Streptococcus     H/O pre-eclampsia in prior pregnancy, currently pregnant     Preeclampsia     with both pregnancy    Varicella     as a child    Wounds and injuries     back injury, broke hand falling off jet when in the    .  Gestational age 34w4d. Vital signs per doc flowsheet. Fetal movement present. Patient reports Rule Out Pre-eclampsia  as reason for admission. Support persons is present.  Pt presented in MAC with HA for last couple days no relief with Tylenol.  Action: Pre-E labs are normal,Dr Anne have been notified and he talked to Perinatologist and was recommended to admission and give Steroids and BMZ.    Verbal consent for EFM, external fetal monitors applied. Admission assessment completed.Patient instructed to report change in fetal movement, contractions, vaginal leaking of fluid or bleeding, abdominal pain, or any concerns related to the pregnancy to her nurse/physician. Patient oriented to room, call light  in reach.  Response: Dr. Anne  talked to pt and So.Discussed POC Plan per provider is Magnesium sulfate loading dose followed by 2 gm/hr,BMZ, GBS Sawb,pain meds and follow Antihypertensive orders.Patient verbalized understanding of education and verbalized agreement with plan.

## 2023-08-01 NOTE — CONSULTS
_                            Neonatology Advanced Practice Antepartum Counseling Consult      I was asked to provide antepartum counseling for Echo Mcgovern at the request of Shanice Farrar MD secondary to  induction of labor . Ms. Mcgovern is currently 34.5 weeks and has a hx significant for pre-eclampsia. Betamethasone was administered on , next dose  today at 1800. Ms. Mcgovern, accompanied by her , was counseled on the expected hospital course, potential risks, and outcomes associated with an infant born at approximately 34.5 weeks gestation. The counseling included: morbidity,  initial delivery room stabilization, respiratory course, lung development, RDS, hyperbilirubinemia, infection, nutrition, growth and development, and long term outcomes. Please feel free to call with any additional questions or concerns.          Floor Time (min): 10 min  Face to Face Time (min): 20  Total Time (minutes): 30  More than 50% of my time was spent in direct, face to face, antepartum counseling with the above patient.      LYNNE De La Vega-CNP 2023 2:54 PM    Advanced Practice Service    Intensive Care Unit  [facility]

## 2023-08-02 LAB
ALBUMIN SERPL BCG-MCNC: 3.4 G/DL (ref 3.5–5.2)
ALP SERPL-CCNC: 80 U/L (ref 35–104)
ALT SERPL W P-5'-P-CCNC: 13 U/L (ref 0–50)
ANION GAP SERPL CALCULATED.3IONS-SCNC: 10 MMOL/L (ref 7–15)
AST SERPL W P-5'-P-CCNC: 16 U/L (ref 0–45)
BILIRUB SERPL-MCNC: 0.2 MG/DL
BUN SERPL-MCNC: 7.1 MG/DL (ref 6–20)
CALCIUM SERPL-MCNC: 6.7 MG/DL (ref 8.6–10)
CHLORIDE SERPL-SCNC: 104 MMOL/L (ref 98–107)
CREAT SERPL-MCNC: 0.49 MG/DL (ref 0.51–0.95)
DEPRECATED HCO3 PLAS-SCNC: 21 MMOL/L (ref 22–29)
ERYTHROCYTE [DISTWIDTH] IN BLOOD BY AUTOMATED COUNT: 13.1 % (ref 10–15)
GFR SERPL CREATININE-BSD FRML MDRD: >90 ML/MIN/1.73M2
GLUCOSE SERPL-MCNC: 116 MG/DL (ref 70–99)
HCT VFR BLD AUTO: 35.6 % (ref 35–47)
HGB BLD-MCNC: 11.8 G/DL (ref 11.7–15.7)
MAGNESIUM SERPL-MCNC: 5.4 MG/DL (ref 1.7–2.3)
MCH RBC QN AUTO: 32.8 PG (ref 26.5–33)
MCHC RBC AUTO-ENTMCNC: 33.1 G/DL (ref 31.5–36.5)
MCV RBC AUTO: 99 FL (ref 78–100)
PLATELET # BLD AUTO: 161 10E3/UL (ref 150–450)
POTASSIUM SERPL-SCNC: 4.2 MMOL/L (ref 3.4–5.3)
PROT SERPL-MCNC: 5.9 G/DL (ref 6.4–8.3)
RBC # BLD AUTO: 3.6 10E6/UL (ref 3.8–5.2)
SODIUM SERPL-SCNC: 135 MMOL/L (ref 136–145)
T PALLIDUM AB SER QL: NONREACTIVE
WBC # BLD AUTO: 16.5 10E3/UL (ref 4–11)

## 2023-08-02 PROCEDURE — 250N000011 HC RX IP 250 OP 636: Performed by: ANESTHESIOLOGY

## 2023-08-02 PROCEDURE — 250N000013 HC RX MED GY IP 250 OP 250 PS 637: Performed by: OBSTETRICS & GYNECOLOGY

## 2023-08-02 PROCEDURE — 10907ZC DRAINAGE OF AMNIOTIC FLUID, THERAPEUTIC FROM PRODUCTS OF CONCEPTION, VIA NATURAL OR ARTIFICIAL OPENING: ICD-10-PCS | Performed by: OBSTETRICS & GYNECOLOGY

## 2023-08-02 PROCEDURE — 250N000011 HC RX IP 250 OP 636: Mod: JZ | Performed by: OBSTETRICS & GYNECOLOGY

## 2023-08-02 PROCEDURE — 258N000003 HC RX IP 258 OP 636: Performed by: OBSTETRICS & GYNECOLOGY

## 2023-08-02 PROCEDURE — 85027 COMPLETE CBC AUTOMATED: CPT | Performed by: OBSTETRICS & GYNECOLOGY

## 2023-08-02 PROCEDURE — 3E0P7GC INTRODUCTION OF OTHER THERAPEUTIC SUBSTANCE INTO FEMALE REPRODUCTIVE, VIA NATURAL OR ARTIFICIAL OPENING: ICD-10-PCS | Performed by: OBSTETRICS & GYNECOLOGY

## 2023-08-02 PROCEDURE — 80053 COMPREHEN METABOLIC PANEL: CPT | Performed by: OBSTETRICS & GYNECOLOGY

## 2023-08-02 PROCEDURE — 250N000011 HC RX IP 250 OP 636: Mod: JZ | Performed by: ANESTHESIOLOGY

## 2023-08-02 PROCEDURE — 120N000001 HC R&B MED SURG/OB

## 2023-08-02 PROCEDURE — 00HU33Z INSERTION OF INFUSION DEVICE INTO SPINAL CANAL, PERCUTANEOUS APPROACH: ICD-10-PCS | Performed by: ANESTHESIOLOGY

## 2023-08-02 PROCEDURE — 83735 ASSAY OF MAGNESIUM: CPT | Performed by: OBSTETRICS & GYNECOLOGY

## 2023-08-02 PROCEDURE — 36415 COLL VENOUS BLD VENIPUNCTURE: CPT | Performed by: OBSTETRICS & GYNECOLOGY

## 2023-08-02 PROCEDURE — 86780 TREPONEMA PALLIDUM: CPT | Performed by: OBSTETRICS & GYNECOLOGY

## 2023-08-02 PROCEDURE — 59400 OBSTETRICAL CARE: CPT | Performed by: OBSTETRICS & GYNECOLOGY

## 2023-08-02 PROCEDURE — 722N000001 HC LABOR CARE VAGINAL DELIVERY SINGLE

## 2023-08-02 PROCEDURE — 3E0R3BZ INTRODUCTION OF ANESTHETIC AGENT INTO SPINAL CANAL, PERCUTANEOUS APPROACH: ICD-10-PCS | Performed by: ANESTHESIOLOGY

## 2023-08-02 RX ORDER — CALCIUM GLUCONATE 94 MG/ML
1 INJECTION, SOLUTION INTRAVENOUS
Status: DISCONTINUED | OUTPATIENT
Start: 2023-08-02 | End: 2023-08-04 | Stop reason: HOSPADM

## 2023-08-02 RX ORDER — METHYLERGONOVINE MALEATE 0.2 MG/ML
200 INJECTION INTRAVENOUS
Status: DISCONTINUED | OUTPATIENT
Start: 2023-08-02 | End: 2023-08-04 | Stop reason: HOSPADM

## 2023-08-02 RX ORDER — TRANEXAMIC ACID 10 MG/ML
1 INJECTION, SOLUTION INTRAVENOUS EVERY 30 MIN PRN
Status: DISCONTINUED | OUTPATIENT
Start: 2023-08-02 | End: 2023-08-04 | Stop reason: HOSPADM

## 2023-08-02 RX ORDER — IBUPROFEN 800 MG/1
800 TABLET, FILM COATED ORAL EVERY 6 HOURS PRN
Status: DISCONTINUED | OUTPATIENT
Start: 2023-08-02 | End: 2023-08-04 | Stop reason: HOSPADM

## 2023-08-02 RX ORDER — OXYTOCIN/0.9 % SODIUM CHLORIDE 30/500 ML
340 PLASTIC BAG, INJECTION (ML) INTRAVENOUS CONTINUOUS PRN
Status: DISCONTINUED | OUTPATIENT
Start: 2023-08-02 | End: 2023-08-04 | Stop reason: HOSPADM

## 2023-08-02 RX ORDER — CARBOPROST TROMETHAMINE 250 UG/ML
250 INJECTION, SOLUTION INTRAMUSCULAR
Status: DISCONTINUED | OUTPATIENT
Start: 2023-08-02 | End: 2023-08-04 | Stop reason: HOSPADM

## 2023-08-02 RX ORDER — FENTANYL CITRATE-0.9 % NACL/PF 10 MCG/ML
100 PLASTIC BAG, INJECTION (ML) INTRAVENOUS EVERY 5 MIN PRN
Status: DISCONTINUED | OUTPATIENT
Start: 2023-08-02 | End: 2023-08-02

## 2023-08-02 RX ORDER — HYDROCORTISONE 25 MG/G
CREAM TOPICAL 3 TIMES DAILY PRN
Status: DISCONTINUED | OUTPATIENT
Start: 2023-08-02 | End: 2023-08-04 | Stop reason: HOSPADM

## 2023-08-02 RX ORDER — MAGNESIUM SULFATE HEPTAHYDRATE 40 MG/ML
4 INJECTION, SOLUTION INTRAVENOUS
Status: DISCONTINUED | OUTPATIENT
Start: 2023-08-02 | End: 2023-08-04 | Stop reason: HOSPADM

## 2023-08-02 RX ORDER — MISOPROSTOL 200 UG/1
800 TABLET ORAL
Status: DISCONTINUED | OUTPATIENT
Start: 2023-08-02 | End: 2023-08-04 | Stop reason: HOSPADM

## 2023-08-02 RX ORDER — MISOPROSTOL 200 UG/1
400 TABLET ORAL
Status: DISCONTINUED | OUTPATIENT
Start: 2023-08-02 | End: 2023-08-04 | Stop reason: HOSPADM

## 2023-08-02 RX ORDER — OXYTOCIN 10 [USP'U]/ML
10 INJECTION, SOLUTION INTRAMUSCULAR; INTRAVENOUS
Status: DISCONTINUED | OUTPATIENT
Start: 2023-08-02 | End: 2023-08-04 | Stop reason: HOSPADM

## 2023-08-02 RX ORDER — BUPIVACAINE HYDROCHLORIDE 2.5 MG/ML
INJECTION, SOLUTION EPIDURAL; INFILTRATION; INTRACAUDAL
Status: COMPLETED | OUTPATIENT
Start: 2023-08-02 | End: 2023-08-02

## 2023-08-02 RX ORDER — TERBUTALINE SULFATE 1 MG/ML
0.25 INJECTION, SOLUTION SUBCUTANEOUS
Status: DISCONTINUED | OUTPATIENT
Start: 2023-08-02 | End: 2023-08-02 | Stop reason: HOSPADM

## 2023-08-02 RX ORDER — HYDROXYZINE HYDROCHLORIDE 50 MG/1
100 TABLET, FILM COATED ORAL
Status: COMPLETED | OUTPATIENT
Start: 2023-08-02 | End: 2023-08-02

## 2023-08-02 RX ORDER — MODIFIED LANOLIN
OINTMENT (GRAM) TOPICAL
Status: DISCONTINUED | OUTPATIENT
Start: 2023-08-02 | End: 2023-08-04 | Stop reason: HOSPADM

## 2023-08-02 RX ORDER — NALBUPHINE HYDROCHLORIDE 20 MG/ML
2.5-5 INJECTION, SOLUTION INTRAMUSCULAR; INTRAVENOUS; SUBCUTANEOUS EVERY 6 HOURS PRN
Status: DISCONTINUED | OUTPATIENT
Start: 2023-08-02 | End: 2023-08-02

## 2023-08-02 RX ORDER — DOCUSATE SODIUM 100 MG/1
100 CAPSULE, LIQUID FILLED ORAL DAILY
Status: DISCONTINUED | OUTPATIENT
Start: 2023-08-02 | End: 2023-08-04 | Stop reason: HOSPADM

## 2023-08-02 RX ORDER — MAGNESIUM SULFATE HEPTAHYDRATE 40 MG/ML
2 INJECTION, SOLUTION INTRAVENOUS
Status: CANCELLED | OUTPATIENT
Start: 2023-08-02

## 2023-08-02 RX ORDER — MAGNESIUM SULFATE HEPTAHYDRATE 40 MG/ML
2 INJECTION, SOLUTION INTRAVENOUS
Status: DISCONTINUED | OUTPATIENT
Start: 2023-08-02 | End: 2023-08-04 | Stop reason: HOSPADM

## 2023-08-02 RX ORDER — MISOPROSTOL 100 UG/1
25 TABLET ORAL EVERY 4 HOURS PRN
Status: DISCONTINUED | OUTPATIENT
Start: 2023-08-02 | End: 2023-08-02 | Stop reason: HOSPADM

## 2023-08-02 RX ORDER — ACETAMINOPHEN 325 MG/1
650 TABLET ORAL EVERY 4 HOURS PRN
Status: DISCONTINUED | OUTPATIENT
Start: 2023-08-02 | End: 2023-08-04 | Stop reason: HOSPADM

## 2023-08-02 RX ORDER — BISACODYL 10 MG
10 SUPPOSITORY, RECTAL RECTAL DAILY PRN
Status: DISCONTINUED | OUTPATIENT
Start: 2023-08-02 | End: 2023-08-04 | Stop reason: HOSPADM

## 2023-08-02 RX ORDER — METOCLOPRAMIDE HYDROCHLORIDE 5 MG/ML
10 INJECTION INTRAMUSCULAR; INTRAVENOUS ONCE
Status: COMPLETED | OUTPATIENT
Start: 2023-08-02 | End: 2023-08-02

## 2023-08-02 RX ADMIN — SODIUM CHLORIDE, POTASSIUM CHLORIDE, SODIUM LACTATE AND CALCIUM CHLORIDE 67 ML/HR: 600; 310; 30; 20 INJECTION, SOLUTION INTRAVENOUS at 16:09

## 2023-08-02 RX ADMIN — MAGNESIUM SULFATE IN WATER 2 G/HR: 40 INJECTION, SOLUTION INTRAVENOUS at 08:10

## 2023-08-02 RX ADMIN — DOCUSATE SODIUM 100 MG: 100 CAPSULE, LIQUID FILLED ORAL at 23:31

## 2023-08-02 RX ADMIN — METOCLOPRAMIDE HYDROCHLORIDE 10 MG: 5 INJECTION INTRAMUSCULAR; INTRAVENOUS at 10:19

## 2023-08-02 RX ADMIN — MISOPROSTOL 25 MCG: 100 TABLET ORAL at 06:27

## 2023-08-02 RX ADMIN — MISOPROSTOL 25 MCG: 100 TABLET ORAL at 08:56

## 2023-08-02 RX ADMIN — BUPIVACAINE HYDROCHLORIDE 10 ML: 2.5 INJECTION, SOLUTION EPIDURAL; INFILTRATION; INTRACAUDAL at 16:03

## 2023-08-02 RX ADMIN — Medication: at 16:08

## 2023-08-02 RX ADMIN — ACETAMINOPHEN 650 MG: 325 TABLET, FILM COATED ORAL at 23:31

## 2023-08-02 RX ADMIN — MISOPROSTOL 25 MCG: 100 TABLET ORAL at 04:14

## 2023-08-02 RX ADMIN — HYDROXYZINE HYDROCHLORIDE 100 MG: 50 TABLET, FILM COATED ORAL at 02:10

## 2023-08-02 RX ADMIN — MISOPROSTOL 25 MCG: 100 TABLET ORAL at 02:10

## 2023-08-02 RX ADMIN — KETOROLAC TROMETHAMINE 30 MG: 30 INJECTION, SOLUTION INTRAMUSCULAR; INTRAVENOUS at 19:29

## 2023-08-02 RX ADMIN — SODIUM CHLORIDE, POTASSIUM CHLORIDE, SODIUM LACTATE AND CALCIUM CHLORIDE 75 ML/HR: 600; 310; 30; 20 INJECTION, SOLUTION INTRAVENOUS at 11:03

## 2023-08-02 ASSESSMENT — ACTIVITIES OF DAILY LIVING (ADL)
ADLS_ACUITY_SCORE: 18

## 2023-08-02 NOTE — PROVIDER NOTIFICATION
08/02/23 1257   Provider Notification   Provider Name/Title Dr. Mishra   Method of Notification At Bedside     MD at bedside to update pt on POC. Reviewed FHR strip. AROM @ 1257 for clear fluid. Updated Briggs score of 8.    Orders to start pitocin approx 30 min after AROM if category 1 tracing.

## 2023-08-02 NOTE — PROVIDER NOTIFICATION
08/02/23 1815   Provider Notification   Provider Name/Title Dr Mishra   Method of Notification Electronic Page   Notification Reason Status Update     Updated re: SVE 5/95/-2. Has an epidural, starting to feel pressure.      called at 1624, en route to hospital. Orders to check cervix in approx 30 min.
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
## 2023-08-02 NOTE — PROVIDER NOTIFICATION
08/02/23 0053   Provider Notification   Provider Name/Title Dr Malachi Farrar   Method of Notification Electronic Page;Phone   Request Evaluate - Remote     Update to MD on patient status. Pitocin currently at 22mU/min. Contractions every 2-4 minutes; patient reports sleeping through most but getting more painful. Category 1 strip. SVE 1.5/50/-4. New calculated hernández score of 4. Headache still persistent, rating 7/10 pain. Oxygen saturations have been 93-94 while asleep, RN noticed that they were in the low 90s the night prior as well. Lung sounds clear, no SOB noted.     MD VORB for 100mg PO of Atarax PRN for sleep tonight, can give tylenol as needed. MD would like to go back to cervical ripening. VORB to stop pitocin and start PO cytotec for 2 doses, recheck SVE at 0600 and update MD; will decide if 3rd dose is needed based on SVE. MD ok with patient being in low-mid 90s for SpO2. Will continue to monitor and if dyspnea develops, will consider a chest xray in the morning. MD VORB magnesium level, CMP and CBC labs to be drawn at 0600.

## 2023-08-02 NOTE — PROVIDER NOTIFICATION
08/02/23 1000   Provider Notification   Provider Name/Title Dr Mishra   Method of Notification At Bedside     MD at bedside to check on pt and discuss plan of care. Pt continuing to report 7/10 headache.    Orders received for one-time dose IV 10 mg Reglan.

## 2023-08-02 NOTE — PROVIDER NOTIFICATION
"   08/02/23 1237   Provider Notification   Provider Name/Title Dr. Mishra   Method of Notification Electronic Page   Request Evaluate - Remote     \"Update - FHT Late decels x3, moderate variability with accels. Contractions q4-6 min, palpating mild.\"    MD requested updated SVE. Will come to bedside and update pt on POC.  "

## 2023-08-02 NOTE — ANESTHESIA PROCEDURE NOTES
"Epidural catheter Procedure Note    Pre-Procedure   Staff -        Anesthesiologist:  Hunter Carter MD       Performed By: anesthesiologist       Referred By: Tad       Location: OB       Pre-Anesthestic Checklist: patient identified, IV checked, risks and benefits discussed, informed consent, monitors and equipment checked, pre-op evaluation, at physician/surgeon's request and post-op pain management  Timeout:       Correct Patient: Yes        Correct Procedure: Yes        Correct Site: Yes        Correct Position: Yes   Procedure Documentation  Procedure: epidural catheter       Patient Position: sitting       Patient Prep/Sterile Barriers: sterile gloves, mask, patient draped       Skin prep: Betadine       Local skin infiltrated with mL of 1% lidocaine.        Insertion Site: L3-4. (midline approach).       Technique: LORT saline        ADELA at 5 cm.       Needle Type: GL 2oursy needle       Needle Gauge: 17.        Needle Length (Inches): 3.5        Catheter: 19 G.          Catheter threaded easily.         6 cm epidural space.         Threaded 11 cm at skin.         # of attempts: 1 and  # of redirects:     Assessment/Narrative         Paresthesias: No.       Test dose of 3 mL at 16:00 CDT.         Test dose negative, 3 minutes after injection, for signs of intravascular, subdural, or intrathecal injection.       Insertion/Infusion Method: LORT saline       Aspiration negative for Heme or CSF via Epidural Catheter.    Medication(s) Administered   0.25% Bupivacaine PF (Epidural) - EPIDURAL   10 mL - 8/2/2023 4:03:00 PM    FOR [facility] [place] ONLY:   Pain Team Contact information: please page the Pain Team Via TripTouch. Search \"Pain\". During daytime hours, please page the attending first. At night please page the resident first.      "

## 2023-08-02 NOTE — PROGRESS NOTES
Intrapartum progress note:    S: Patient resting, continues to report 7/10 HA, unchanged. No scotoma, no RUQ pain. Mild nausea today.     O:   Vitals:    23 0600 23 0700 23 0800 23 0900   BP: 109/59 106/73 122/79 116/74   BP Location: Left arm  Left arm Left arm   Patient Position: Supine  Semi-Gomez's Semi-Gomez's   Cuff Size: Adult Regular  Adult Regular Adult Regular   Pulse:       Resp: 16 18 18 16   Temp: 98.1  F (36.7  C)  98.2  F (36.8  C) 98.5  F (36.9  C)   TempSrc: Oral  Oral Oral   SpO2: 97% 98% 99% 99%   Weight:       Height:          Gen: resting, in NAD  Cx: deferred  FHT: baseline 125, moderate variability, + accels, no decels  [place]: irregular    A/P: Echo Mcgovern is a 28 year old female  at 34w6d here for IOL for preeclampsia with SF in the setting of persistent HA.   - FWB: Cat 1 tracing, reactive   - s/p BMZ x2, complete at 6pm today  - GBS negative, no further PCN indicated  - IOL: she is s/p cytotec x3, transitioned to pitocin with no progress, and switched back to cytotec overnight. S/p vaginal cytotec at 9:15, repeat cx exam 1315 and consider balloon vs pitocin at that time  - Hypertension: she has been evaluated multiple times for gestational hypertension this pregnancy, and has a history of preeclampsia with prior pregnancies. However, since admission her BPs have been normal range. 7/10 HA persists.   - discussed with MFM today, plan to continue magnesium at this time, plan MRI if ongoing HA after delivery.   - HA: concern for severe feature of preE despite being normotensive.   - Analgesia: per pt  - Anticipate     Jennifer Msihra MD 2023 9:59 AM

## 2023-08-02 NOTE — PROVIDER NOTIFICATION
08/02/23 0830   Provider Notification   Provider Name/Title Dr Mishra   Method of Notification Phone   Request Evaluate - Remote     MD updated SVE 1.5/50/-3. Pitocin stopped at 0108. 3x doses PO cytotec given overnight, last at 0615. Kristi q7-10 min. Palpating mild. Category 1 tracing.    TORB 1x dose vaginal cytotec, recheck SVE 4 hours after dose.

## 2023-08-02 NOTE — PROVIDER NOTIFICATION
08/02/23 0622   Provider Notification   Provider Name/Title Dr Malachi Farrar   Method of Notification Phone   Request Evaluate - Remote     Patient update to MD. 0600 SVE 1.5/50/-3. 2 doses of cytotec given. Cervix is anterior and slightly softer. New calculated hernández score of 6. Contractions are currently 10-15 minutes apart. Headache remains unchanged. No new symptoms. 0600 labs yet to be drawn.     MD orders 1 more dose of cytotec, and will re-evaluate this morning.

## 2023-08-02 NOTE — PROVIDER NOTIFICATION
08/02/23 1555   Provider Notification   Provider Name/Title Dr Carter   Method of Notification At Bedside     MDA at bedside to place epidural.

## 2023-08-02 NOTE — PROVIDER NOTIFICATION
08/02/23 1533   Provider Notification   Provider Name/Title Dr Mishra   Method of Notification Phone   Notification Reason Status Update      called unit for update. Updated re: cat one tracing, contractions every 4-5 minutes. Orders for SVE.    Provider sent page with SVE 3/70/-2, pt requesting epidural, prepping for that now.

## 2023-08-02 NOTE — PROVIDER NOTIFICATION
08/01/23 1924   Provider Notification   Provider Name/Title Dr Malachi Farrar   Method of Notification Phone   Request Evaluate - Remote     Provider called to update on pt status. Pt has Pitocin running at 8mu/hr. SVE 2/50%/-3. Pt rating headache 7/10 and would like to try Tylenol again. FHR Category 1, moderate variability with accels. GBS test now resulted and negative.  Per provider, plan to continue increasing Pitocin per order and SVE in 4 hours. Provider will consider AROM at this time pending SVE.  Tylenol reordered for headache per verbal order 975mg Q6hr as able per maxium dosing guidelines. PCN discontinued.  Will continue to update provider as needed.

## 2023-08-03 LAB
ALT SERPL W P-5'-P-CCNC: 10 U/L (ref 0–50)
AST SERPL W P-5'-P-CCNC: 16 U/L (ref 0–45)
CREAT SERPL-MCNC: 0.52 MG/DL (ref 0.51–0.95)
GFR SERPL CREATININE-BSD FRML MDRD: >90 ML/MIN/1.73M2
HGB BLD-MCNC: 10.5 G/DL (ref 11.7–15.7)
PLATELET # BLD AUTO: 167 10E3/UL (ref 150–450)

## 2023-08-03 PROCEDURE — 85049 AUTOMATED PLATELET COUNT: CPT | Performed by: OBSTETRICS & GYNECOLOGY

## 2023-08-03 PROCEDURE — 85018 HEMOGLOBIN: CPT | Performed by: OBSTETRICS & GYNECOLOGY

## 2023-08-03 PROCEDURE — 999N000079 HC STATISTIC IP LACTATION SERVICES 1-15 MIN

## 2023-08-03 PROCEDURE — 36415 COLL VENOUS BLD VENIPUNCTURE: CPT | Performed by: OBSTETRICS & GYNECOLOGY

## 2023-08-03 PROCEDURE — 82565 ASSAY OF CREATININE: CPT | Performed by: OBSTETRICS & GYNECOLOGY

## 2023-08-03 PROCEDURE — 84450 TRANSFERASE (AST) (SGOT): CPT | Performed by: OBSTETRICS & GYNECOLOGY

## 2023-08-03 PROCEDURE — 250N000013 HC RX MED GY IP 250 OP 250 PS 637: Performed by: OBSTETRICS & GYNECOLOGY

## 2023-08-03 PROCEDURE — 258N000003 HC RX IP 258 OP 636: Performed by: OBSTETRICS & GYNECOLOGY

## 2023-08-03 PROCEDURE — 84460 ALANINE AMINO (ALT) (SGPT): CPT | Performed by: OBSTETRICS & GYNECOLOGY

## 2023-08-03 PROCEDURE — 250N000011 HC RX IP 250 OP 636: Mod: JZ | Performed by: OBSTETRICS & GYNECOLOGY

## 2023-08-03 PROCEDURE — 120N000001 HC R&B MED SURG/OB

## 2023-08-03 RX ORDER — NALOXONE HYDROCHLORIDE 0.4 MG/ML
0.2 INJECTION, SOLUTION INTRAMUSCULAR; INTRAVENOUS; SUBCUTANEOUS
Status: DISCONTINUED | OUTPATIENT
Start: 2023-08-03 | End: 2023-08-04 | Stop reason: HOSPADM

## 2023-08-03 RX ORDER — NALOXONE HYDROCHLORIDE 0.4 MG/ML
0.4 INJECTION, SOLUTION INTRAMUSCULAR; INTRAVENOUS; SUBCUTANEOUS
Status: DISCONTINUED | OUTPATIENT
Start: 2023-08-03 | End: 2023-08-04 | Stop reason: HOSPADM

## 2023-08-03 RX ORDER — MAGNESIUM SULFATE IN WATER 40 MG/ML
1 INJECTION, SOLUTION INTRAVENOUS CONTINUOUS
Status: DISCONTINUED | OUTPATIENT
Start: 2023-08-03 | End: 2023-08-04 | Stop reason: HOSPADM

## 2023-08-03 RX ADMIN — IBUPROFEN 800 MG: 800 TABLET ORAL at 00:37

## 2023-08-03 RX ADMIN — DOCUSATE SODIUM 100 MG: 100 CAPSULE, LIQUID FILLED ORAL at 07:55

## 2023-08-03 RX ADMIN — IBUPROFEN 800 MG: 800 TABLET ORAL at 06:26

## 2023-08-03 RX ADMIN — MAGNESIUM SULFATE IN WATER 2 G/HR: 40 INJECTION, SOLUTION INTRAVENOUS at 03:48

## 2023-08-03 RX ADMIN — IBUPROFEN 800 MG: 800 TABLET ORAL at 18:43

## 2023-08-03 RX ADMIN — SODIUM CHLORIDE, POTASSIUM CHLORIDE, SODIUM LACTATE AND CALCIUM CHLORIDE 75 ML/HR: 600; 310; 30; 20 INJECTION, SOLUTION INTRAVENOUS at 03:48

## 2023-08-03 RX ADMIN — ACETAMINOPHEN 650 MG: 325 TABLET, FILM COATED ORAL at 03:48

## 2023-08-03 RX ADMIN — IBUPROFEN 800 MG: 800 TABLET ORAL at 12:43

## 2023-08-03 RX ADMIN — ACETAMINOPHEN 650 MG: 325 TABLET, FILM COATED ORAL at 07:55

## 2023-08-03 ASSESSMENT — ACTIVITIES OF DAILY LIVING (ADL)
ADLS_ACUITY_SCORE: 18

## 2023-08-03 NOTE — L&D DELIVERY NOTE
OB Vaginal Delivery Note    Echo Mcgovern MRN# [id number]   Age: 28 year old YOB: 1995       GA: 34w6d  GP:   Labor Complications: None   Delivery QBL: 200 mL  Delivery Type: Vaginal, Spontaneous   ROM to Delivery Time: (Delivered) Hours: 5 Minutes: 52  [place] Weight: 2.73 kg (6 lb 0.3 oz)    1 Minute 5 Minute 10 Minute   Apgar Totals: 9           MARCIO COOPER;VETO REID;ADORE KLEIN     Delivery Details:  Echo Mcgovern, a 28 year old  female delivered a viable infant with apgars of 9  and  . Patient was fully dilated and pushing after  1 hours   minutes in active labor. Delivery was via vaginal, spontaneous  to a sterile field under epidural  anesthesia. Infant delivered in vertex  left  occiput  anterior  position. Anterior and posterior shoulders delivered without difficulty. The cord was clamped, cut twice and 3 vessels  were noted. Cord blood was obtained in routine fashion with the following disposition: discard .      Cord complications: none   Placenta delivered at 2023  6:56 PM . Placental disposition was Hospital disposal . Fundal massage performed and fundus found to be firm.     Episiotomy: none    Perineum, vagina, cervix were inspected, and the following lacerations were noted:   Perineal lacerations: none                Excellent hemostasis was noted. Needle count correct. Infant and patient in delivery room in good and stable condition.        [place] Female-Echo [[id number]]      Labor Event Times      Latent labor onset date/time: 2023 130    Dilation complete date: 23 Complete time:  6:51 PM   Start pushing date/time: 2023 1853          Labor Length      2nd Stage (hrs): 0 (min): 1   3rd Stage (hrs): 0 (min): 3          Labor Events     steroids: Full Course  Labor Type: AROM, Induction/Cervical ripening     Antibiotics received during labor?: No       Rupture date/time: 23 1301   Rupture type: Artificial  Rupture of Membranes  Fluid color: Clear  Fluid odor: Normal     Induction: Misoprostol  Induction date/time:      Cervical ripening date/time:      Indications for induction: Severe Preeclampsia     Augmentation: Oxytocin, AROM       Delivery/Placenta Date and Time      Delivery Date: 23 Delivery Time:  6:53 PM   Placenta Date/Time: 2023  6:56 PM  Oxytocin given at the time of delivery: after delivery of baby  Delivering clinician: Jennifer Mishra MD   Other personnel present at delivery:  Provider Role   Brianna Reinoso RN Marek, REBECCA Marie Consuelo, RN              Vaginal Counts       Initial count performed by 2 team members:  Two Team Members   Dr. Tad Hernandez RN         Needles Suture Needles Sponges (RETIRED) Instruments   Initial counts 2  5    Added to count       Relief counts       Final counts 2  5            Placed during labor Accounted for at the end of labor   FSE No NA   IUPC No NA   Cervidil No NA                  Final count performed by 2 team members:  Two Team Members   Dr. Tad Lin RN      Final count correct?: Yes  Pre-Birth Team Brief: Complete  Post-Birth Team Debrief: Complete       Apgars    Living status: Living   1 Minute 5 Minute 10 Minute 15 Minute 20 Minute   Skin color: 1        Heart rate: 2        Reflex irritability: 2        Muscle tone: 2        Respiratory effort: 2        Total: 9        Apgars assigned by: 1 MIN: VETO LIN RN       Cord      Vessels: 3 Vessels    Cord Complications: None               Cord Blood Disposition: Discard    Gases Sent?: No    Delayed cord clamping?: Yes    Cord Clamping Delay (seconds):  seconds    Stem cell collection?: No           [place] Measurements      Weight: 6 lb 0.3 oz      Head circumference: 34 cm           Labor Events and Shoulder Dystocia    Shoulder dystocia present?: Neg       Delivery (Maternal) (Provider to Complete) (295968)    Episiotomy: None  Perineal lacerations: None        Repaired?: No   Repair suture: None  Genital tract inspection done: Pos       Blood Loss  Mother: Hali Mcgovern #[id number]     Start of Mother's Information      Delivery Blood Loss  08/02/23 0653 - 08/02/23 1924      Delivery QBL (mL) Hospital Encounter 200 mL    Total  200 mL               End of Mother's Information  Mother: Hali Mcgovern #[id number]                Delivery - Provider to Complete ([id number])    Delivering clinician: Jennifer Mishra MD  Delivery Type (Choose the 1 that will go to the Birth History): Vaginal, Spontaneous                         Other personnel:  Provider Role   Brianna Reinoso RN Marek, REBECCA Marie, REBECCA Hernandez                     Placenta    Date/Time: 8/2/2023  6:56 PM  Removal: Expressed  Disposition: Hospital disposal             Anesthesia    Method: Epidural  Cervical dilation at placement: 0-3                    Presentation and Position    Presentation: Vertex    Position: Left Occiput Anterior                     Jennifer Mishra MD

## 2023-08-03 NOTE — PLAN OF CARE
Goal Outcome Evaluation:      Plan of Care Reviewed With: patient, spouse    Overall Patient Progress: improvingOverall Patient Progress: improving    Outcome Evaluation: Possible discharge - depending on patient's Blood pressures, also infant is in NICU. plan is to come up to [facility] at 12 AM - so it may be a later discharge ?    Data: Vital signs within normal limits. Postpartum checks within normal limits - see flow record. Patient eating and drinking normally. Patient able to empty bladder independently and is up ambulating. No apparent signs of infection. Patient has a labial laceration that was not repaired.  Patient performing self cares . Infant is down in the NICU. Patient and  have been visiting baby down in NICU today. Around 1600 this afternoon they went down to see infant. Patient was in a wheel chair.  pushed her.   Action: Patient medicated during the shift for cramping. See MAR. Patient reassessed within 1 hour after each medication and pain was improved - patient stated she was comfortable. Patient education done about blood pressures, pain medications. See flow record.  Response: infant is supposed to come up from NICU at 12AM, patient and  very excited!  very supportive and at bedside.   Plan: Anticipate discharge on  or 23 depending on BPs and infant.

## 2023-08-03 NOTE — PLAN OF CARE
Magnesium Sulfate infusing at 2 grams/hour at beginning of shift and LR at 75 ml/hour.  Pt tolerating Magnesium infusion well. Patient continues to have headache pain but it has decreased overnight to 2/10, no RUQ pain or visual disturbances reported. No clonus, reflexes 2+.  Vital signs stable other than one elevated diastolic (93) this morning.  Pt is ambulating on her own to BR, voiding without difficulty - good output. Fundus firm, lochia scant-light. Pt plans to pump and bottle baby.  Pump brought to room and pt encouraged to begin pumping as soon as possible and to pump every 3 hours as able.  Pt to NICU via wheelchair and accompanied by RN to visit baby this morning. Baby is doing well and plan is to transfer baby to floor later tonight if baby remains stable.      Magnesium Sulfate decreased to 1 gm/hour at 0839.  Pt stable on Magnesium 1 grams so infusion discontinued at 1246 per MD.  Pt's headache has improved to 1/10, pt remains otherwise symptomatic. PT's  is at bedside and attentive to her needs.  Will continue to monitor.     Update:  Pt was able to get some rest this afternoon.BP after discontinuing Magnesium:  127/92. at 1415  --  Headache remains 1/10 but pt reports no RUQ pain or visual changes. Reflexes remain 2+, no clonus. Pt plans to shower - she remains steady on her feet.  still at bedside.

## 2023-08-03 NOTE — ANESTHESIA POSTPROCEDURE EVALUATION
Patient: Echo Mcgovern    Procedure: * No procedures listed *       Anesthesia Type:  Epidural    Note:  Disposition: Inpatient   Postop Pain Control:    PONV:    Neuro/Psych:    Airway/Respiratory:    CV/Hemodynamics:    Other NRE:    DID A NON-ROUTINE EVENT OCCUR? No    Event details/Postop Comments:  I or my partner was immediately available for management of this patient during epidural analgesia infusion.   Patient post labor epidural catheter, doing well.  She reports good pain relief with epidural catheter.  She denies ongoing sensorimotor block, headache, fever, chills or other complaints.  All questions answered, understanding voiced.  She will have us contacted for any questions or problems.           Last vitals:  Vitals:    08/03/23 0730 08/03/23 0755 08/03/23 0825   BP: 119/87  (!) 128/93   Pulse: 98  100   Resp:   20   Temp: 97.7  F (36.5  C)     SpO2: 95% 98% 96%       Electronically Signed By: Alon Zuniga MD  August 3, 2023  9:10 AM

## 2023-08-03 NOTE — PROGRESS NOTES
[facility] Obstetrics Post-Partum Progress Note          Assessment and Plan:    Assessment:   Post-partum day #1  Echo Mcgovern is a 28 year old female  at 34w6d here for IOL for preeclampsia with SF in the setting of persistent H /A now s/p vaginal birth  L&D complications: Headache is better  pt feels it is related to magnesium sulfate  Denies visual changes or RUQ discomfort      Doing well.  No excessive bleeding  Pain well-controlled.      Plan:   Ambulation encouraged  Breast feeding strategies discussed  Pain control measures as needed  Reportable signs and symptoms dicussed with the patient  Will wean magnesium sulfate and monitor H/A  if H/A persistent or worse will consider imaging           Interval History:   Doing well.  Pain is well-controlled.  No fevers.  No history of foul-smelling vaginal discharge.  Good appetite.  Denies chest pain, shortness of breath, nausea or vomiting.  Vaginal bleeding is similar to a heavy menstrual flow.  Ambulatory.  Breastfeeding well.          Significant Problems:      Past Medical History:   Diagnosis Date    Abnormal Pap smear of cervix 2016    see problem list    Carrier of group B Streptococcus     H/O pre-eclampsia in prior pregnancy, currently pregnant     Preeclampsia     with both pregnancy    Varicella     as a child    Wounds and injuries     back injury, broke hand falling off jet when in the              Review of Systems:    The patient denies any chest pain, shortness of breath, excessive pain, fever, chills, purulent drainage from the wound, nausea or vomiting.          Medications:   All medications related to the patient's surgery have been reviewed  Current Facility-Administered Medications   Medication    acetaminophen (TYLENOL) tablet 650 mg    acetaminophen (TYLENOL) tablet 975 mg    benzocaine (AMERICAINE) 20 % topical spray    bisacodyl (DULCOLAX) suppository 10 mg    calcium gluconate 10 % injection 1 g     carboprost (HEMABATE) injection 250 mcg    docusate sodium (COLACE) capsule 100 mg    hydrocortisone (Perianal) (ANUSOL-HC) 2.5 % cream    ibuprofen (ADVIL/MOTRIN) tablet 800 mg    ketorolac (TORADOL) injection 30 mg    Or    ketorolac (TORADOL) injection 30 mg    Or    ibuprofen (ADVIL/MOTRIN) tablet 800 mg    labetalol (NORMODYNE/TRANDATE) injection 20 mg    lactated ringers infusion    lanolin cream    lidocaine (LMX4) cream    lidocaine 1 % 0.1-1 mL    lidocaine 1 % 0.1-20 mL    magnesium sulfate 2 g in 50 mL sterile water intermittent infusion    magnesium sulfate 4 g in 100 mL sterile water intermittent infusion    magnesium sulfate infusion    methylergonovine (METHERGINE) injection 200 mcg    midazolam (VERSED) injection 2 mg    misoprostol (CYTOTEC) tablet 400 mcg    Or    misoprostol (CYTOTEC) tablet 800 mcg    nalbuphine (NUBAIN) injection 2.5-5 mg    NIFEdipine (PROCARDIA) capsule 10-20 mg    No MMR Needed - Assessment: Patient does not need MMR vaccine    oxyCODONE (ROXICODONE) tablet 5-10 mg    oxytocin (PITOCIN) 30 units in 500 mL 0.9% NaCl infusion    oxytocin (PITOCIN) 30 units in 500 mL 0.9% NaCl infusion    oxytocin (PITOCIN) injection 10 Units    oxytocin (PITOCIN) injection 10 Units    sodium chloride (PF) 0.9% PF flush 3 mL    sodium chloride (PF) 0.9% PF flush 3 mL    Tdap (tetanus-diphtheria-acell pertussis) (ADACEL) injection 0.5 mL    tranexamic acid 1 g in 100 mL NS IV bag (premix)             Physical Exam:   Vitals were reviewed  All vitals stable  Temp: 97.7  F (36.5  C) Temp src: Oral BP: 108/75 Pulse: 98   Resp: 20 SpO2: 99 % O2 Device: None (Room air)    Uterine fundus is firm, non-tender and at the level of the umbilicus          Data:   All laboratory data related to this surgery reviewed  Hemoglobin   Date Value Ref Range Status   08/03/2023 10.5 (L) 11.7 - 15.7 g/dL Final   08/02/2023 11.8 11.7 - 15.7 g/dL Final   08/01/2023 12.9 11.7 - 15.7 g/dL Final   07/31/2023 12.1 11.7 -  15.7 g/dL Final   07/27/2023 12.7 11.7 - 15.7 g/dL Final   07/07/2021 13.5 11.7 - 15.7 g/dL Final   09/13/2018 11.7 11.7 - 15.7 g/dL Final   09/12/2018 13.5 11.7 - 15.7 g/dL Final   09/12/2018 13.4 11.7 - 15.7 g/dL Final   09/11/2018 13.6 11.7 - 15.7 g/dL Final     No imaging studies have been ordered    Jack Anne MD

## 2023-08-03 NOTE — PLAN OF CARE
Goal Outcome Evaluation:    VSS, on room air. Primarily complaining of a mild headache, controlled with ibuprofen & tylenol. Up independently. Baby girl in NICU.  supportive @ bedside. Mg infusing- DTRs WDL, no clonus, denies preeclampsia symptoms besides mild headache. Fundus firm @ midline. Voiding adequately.

## 2023-08-03 NOTE — PLAN OF CARE
VORB to decrease Magnesium Sulfate infusion to 1gm/hr at 0830 and if BPs remain stable then discontinue Mag at noon. Primary RN Jeny notified and orders updated.

## 2023-08-04 VITALS
DIASTOLIC BLOOD PRESSURE: 81 MMHG | OXYGEN SATURATION: 96 % | BODY MASS INDEX: 33.24 KG/M2 | RESPIRATION RATE: 18 BRPM | WEIGHT: 194.7 LBS | TEMPERATURE: 98.2 F | SYSTOLIC BLOOD PRESSURE: 123 MMHG | HEART RATE: 78 BPM | HEIGHT: 64 IN

## 2023-08-04 PROBLEM — O14.10 PREECLAMPSIA, SEVERE: Status: ACTIVE | Noted: 2023-08-04

## 2023-08-04 LAB
ALT SERPL W P-5'-P-CCNC: 12 U/L (ref 0–50)
AST SERPL W P-5'-P-CCNC: 19 U/L (ref 0–45)
CREAT SERPL-MCNC: 0.54 MG/DL (ref 0.51–0.95)
GFR SERPL CREATININE-BSD FRML MDRD: >90 ML/MIN/1.73M2
HGB BLD-MCNC: 11.7 G/DL (ref 11.7–15.7)
PLATELET # BLD AUTO: 172 10E3/UL (ref 150–450)

## 2023-08-04 PROCEDURE — 85049 AUTOMATED PLATELET COUNT: CPT | Performed by: OBSTETRICS & GYNECOLOGY

## 2023-08-04 PROCEDURE — 250N000013 HC RX MED GY IP 250 OP 250 PS 637: Performed by: OBSTETRICS & GYNECOLOGY

## 2023-08-04 PROCEDURE — 85018 HEMOGLOBIN: CPT | Performed by: OBSTETRICS & GYNECOLOGY

## 2023-08-04 PROCEDURE — 82565 ASSAY OF CREATININE: CPT | Performed by: OBSTETRICS & GYNECOLOGY

## 2023-08-04 PROCEDURE — 84460 ALANINE AMINO (ALT) (SGPT): CPT | Performed by: OBSTETRICS & GYNECOLOGY

## 2023-08-04 PROCEDURE — 36415 COLL VENOUS BLD VENIPUNCTURE: CPT | Performed by: OBSTETRICS & GYNECOLOGY

## 2023-08-04 PROCEDURE — 84450 TRANSFERASE (AST) (SGOT): CPT | Performed by: OBSTETRICS & GYNECOLOGY

## 2023-08-04 RX ADMIN — IBUPROFEN 800 MG: 800 TABLET ORAL at 01:06

## 2023-08-04 ASSESSMENT — ACTIVITIES OF DAILY LIVING (ADL)
ADLS_ACUITY_SCORE: 18
DEPENDENT_IADLS:: INDEPENDENT
ADLS_ACUITY_SCORE: 18

## 2023-08-04 NOTE — LACTATION NOTE
"Lactation follow-up:Hali was bottle feeding infant when writer entered the room. Discussed her plans for feeding infant here and at home. Hali stated she wants to breast feed infant but was unable to get her to latch. When asked about pumping frequency she stated,\" I really haven't been pumping much and I don't even know if I will follow through with that.\" Hali had expressed some colostrum earlier and has it in the fridge. Discussed the importance of frequent breast stimulation by placing the infant to breast or pumping if the goal is to breastfeed, Encouraged her to call for assistance with the next feeding if she would like some help.  "

## 2023-08-04 NOTE — PLAN OF CARE
Goal Outcome Evaluation:      Plan of Care Reviewed With: patient    Overall Patient Progress: improvingOverall Patient Progress: improving         VSS, latest BP if 123/81. DTR's 3+, no clonus noted. PP checks WNL. Flat affect. Voiding without difficulty. SL flushed, patent. Independent with self and  cares. Bottle-feeding and pumping. Weight checked this AM. Ibuprofen for uterine cramping. , present and supportive. Anticipating discharge today.

## 2023-08-04 NOTE — LACTATION NOTE
Lactation visit with Hali whose baby is in NICU. Patient has not started pumping yet. Started patient pumping. Reviewed settings, frequency, cleaning and care of pump parts. Encouraged hand expression and STS when infant stable. Patients plan is to pump and bottle feed. Small volume with pump. Prescription for pump given to parents. Wanting Spectra 1. Spouse to get pump tomorrow and knows lactation can assist with putting pump together for her. All questions answered at this time.

## 2023-08-04 NOTE — PROVIDER NOTIFICATION
08/03/23 1928   Provider Notification   Provider Name/Title Dr. Anne   Method of Notification Phone   Request Evaluate-Remote   Notification Reason Vital Signs Change;Status Update     MD notified of elevated BPs at 1805- 132/102- right arm. 1810- 122/92 left arm. 15 min later BP- 134/98. Reflexes normal. No clonus. No headache. Only discomfort patient is having is uterine cramping.   Received orders from MD is to check another BP in 10 min while resting, if below 140/100, continue to check every 4 hours. Call MD back if BPs are above 140/100 .

## 2023-08-04 NOTE — DISCHARGE SUMMARY
[facility] Obstetrics Post-Partum Progress Note          Assessment and Plan:    Assessment:   Post-partum day #2  Induced vaginal delivery secondary to pre eclampsia (GHTN with severe HA)  L&D complications: none      Doing well.  No excessive bleeding  Pain well-controlled.  BPs normal with a few mild range      Plan:   Discharge later today  Has BP cuff at home and will check BPs daily and report any elevations           Interval History:   Doing well.  Pain is well-controlled.  No fevers.  No history of foul-smelling vaginal discharge.  Good appetite.  Denies chest pain, shortness of breath, nausea or vomiting.  Vaginal bleeding is similar to a heavy menstrual flow.  Ambulatory.  Working on breastfeeding          Significant Problems:      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 Streptococcus     H/O pre-eclampsia in prior pregnancy, currently pregnant     Preeclampsia     with both pregnancy    Varicella     as a child    Wounds and injuries     back injury, broke hand falling off jet when in the              Review of Systems:    The patient denies any chest pain, shortness of breath, excessive pain, fever, chills, purulent drainage from the wound, nausea or vomiting.          Medications:   All medications related to the patient's surgery have been reviewed          Physical Exam:   All vitals stable  Patient Vitals for the past 24 hrs:   BP Temp Temp src Pulse Resp SpO2 Weight   08/04/23 0531 -- -- -- -- -- -- 88.3 kg (194 lb 11.2 oz)   08/03/23 2329 122/85 98.2  F (36.8  C) Oral 88 18 -- --   08/03/23 1947 -- -- -- 76 -- -- --   08/03/23 1945 113/81 -- -- -- -- -- --   08/03/23 1839 -- -- -- 84 -- -- --   08/03/23 1834 (!) 134/98 -- -- 84 -- -- --   08/03/23 1808 (!) 122/92 -- -- 91 -- -- --   08/03/23 1806 (!) 132/102 -- -- 90 -- -- --   08/03/23 1416 (!) 127/92 -- -- 97 -- -- --   08/03/23 1415 (!)  127/92 98.3  F (36.8  C) Oral 97 19 96 % --   08/03/23 1300 -- -- -- -- -- 96 % --   08/03/23 1246 115/82 -- -- -- -- 98 % --   08/03/23 1239 115/82 -- -- 98 -- 98 % --   08/03/23 1130 121/85 98.4  F (36.9  C) Oral 90 17 98 % --   08/03/23 1038 -- -- -- -- -- 95 % --   08/03/23 1030 133/82 -- -- -- 19 96 % --   08/03/23 0925 118/75 -- -- 93 18 99 % --   08/03/23 0825 (!) 128/93 -- -- 100 20 96 % --   08/03/23 0755 -- -- -- -- -- 98 % --   08/03/23 0730 119/87 97.7  F (36.5  C) Oral 98 -- 95 % --     Uterine fundus is firm, non-tender and at the level of the umbilicus   Ext NT with 1+ edema bilat       Data:   All laboratory data related to this surgery reviewed  No imaging studies have been ordered    Bear Rodas MD

## 2023-08-04 NOTE — PLAN OF CARE
Data: Vital signs within normal limits. Postpartum checks within normal limits - see flow record. Patient eating and drinking normally. Patient able to empty bladder independently and is up ambulating. Patient's IV was discontinued today and took a shower. No apparent signs of infection. Labial laceration healing well. Patient performing self cares and is able to care for infant.  Action: Patient medicated during the shift for cramping. See MAR. Patient reassessed within 1 hour after each medication and pain was improved - patient stated she was comfortable. Patient education done about discharge instructions, and pain medication schedules. See flow record.  Response: Positive attachment behaviors observed with infant.  at bedside and is attentive.   Plan: Anticipate discharge on 8/4/253 Goal Outcome Evaluation:      Plan of Care Reviewed With: patient    Overall Patient Progress: improvingOverall Patient Progress: improving    Per Dr. Rodas- patient is to check BP at home TID, report any elevations of BP's. Will work on printing out discharge paperwork.

## 2023-08-08 PROBLEM — Z00.00 ENCOUNTER FOR PREVENTIVE HEALTH EXAMINATION: Status: RESOLVED | Noted: 2022-03-07 | Resolved: 2023-08-08

## 2023-08-08 PROBLEM — O13.3 PIH (PREGNANCY INDUCED HYPERTENSION), THIRD TRIMESTER: Status: RESOLVED | Noted: 2023-07-18 | Resolved: 2023-08-08

## 2023-08-08 PROBLEM — O14.10 PREECLAMPSIA, SEVERE: Status: RESOLVED | Noted: 2023-08-04 | Resolved: 2023-08-08

## 2023-08-08 PROBLEM — Z36.89 ENCOUNTER FOR TRIAGE IN PREGNANT PATIENT: Status: RESOLVED | Noted: 2023-06-12 | Resolved: 2023-08-08

## 2023-08-08 PROBLEM — Z02.89 HEALTH EXAMINATION OF DEFINED SUBPOPULATION: Status: RESOLVED | Noted: 2022-03-07 | Resolved: 2023-08-08

## 2023-09-11 ENCOUNTER — PRENATAL OFFICE VISIT (OUTPATIENT)
Dept: OBGYN | Facility: CLINIC | Age: 28
End: 2023-09-11
Payer: COMMERCIAL

## 2023-09-11 VITALS — DIASTOLIC BLOOD PRESSURE: 78 MMHG | BODY MASS INDEX: 31.24 KG/M2 | SYSTOLIC BLOOD PRESSURE: 112 MMHG | WEIGHT: 182 LBS

## 2023-09-11 DIAGNOSIS — L30.9 DERMATITIS: ICD-10-CM

## 2023-09-11 DIAGNOSIS — Z30.011 ENCOUNTER FOR INITIAL PRESCRIPTION OF CONTRACEPTIVE PILLS: Primary | ICD-10-CM

## 2023-09-11 PROCEDURE — 99213 OFFICE O/P EST LOW 20 MIN: CPT | Mod: 24 | Performed by: OBSTETRICS & GYNECOLOGY

## 2023-09-11 PROCEDURE — 99207 PR POST PARTUM EXAM: CPT | Performed by: OBSTETRICS & GYNECOLOGY

## 2023-09-11 RX ORDER — DESOGESTREL AND ETHINYL ESTRADIOL 0.15-0.03
1 KIT ORAL DAILY
Qty: 84 TABLET | Refills: 3 | Status: SHIPPED | OUTPATIENT
Start: 2023-09-11

## 2023-09-11 RX ORDER — TRIAMCINOLONE ACETONIDE 1 MG/G
OINTMENT TOPICAL 3 TIMES DAILY
Qty: 30 G | Refills: 0 | Status: SHIPPED | OUTPATIENT
Start: 2023-09-11 | End: 2023-10-09

## 2023-09-11 ASSESSMENT — PATIENT HEALTH QUESTIONNAIRE - PHQ9: SUM OF ALL RESPONSES TO PHQ QUESTIONS 1-9: 3

## 2023-09-11 NOTE — PATIENT INSTRUCTIONS
You can reach your [facility] Care Team any time of the day by calling [phone]. This number will put you in touch with the 24 hour nurse line if the clinic is closed.    To contact your OB/GYN Station Coordinator/Surgery Scheduler please call [phone]. This is a direct number for your care team between 8 a.m. and 4 p.m. Monday through Friday.    [facility] is open for your convenience:  Monday through Friday 8 a.m. to 6 p.m.  Closed weekends and all major holidays.

## 2023-09-11 NOTE — NURSING NOTE
"Chief Complaint   Patient presents with    Postpartum Care       Initial /78   Wt 82.6 kg (182 lb)   LMP 2023 (Exact Date)   Breastfeeding No   BMI 31.24 kg/m   Estimated body mass index is 31.24 kg/m  as calculated from the following:    Height as of 23: 1.626 m (5' 4\").    Weight as of this encounter: 82.6 kg (182 lb).  BP completed using cuff size: regular    Questioned patient about current smoking habits.  Pt. has never smoked.          The following HM Due: NONE      The following patient reported/Care Every where data was sent to:  P ABSTRACT QUALITY INITIATIVES [[id number]]        Yandy Bermudez, [facility]                 "

## 2023-09-11 NOTE — PROGRESS NOTES
SUBJECTIVE: Echo Mcgovern is a 28 year old female   is here for a 6-week postpartum checkup.    Date of Last Pap:  3/8/22    Induced vaginal delivery secondary to pre eclampsia (GHTN with severe HA) Delivery date was 23. She had a birth of a viable girl, weight 6 pounds .3 oz., with no complications.  Since delivery, she has not been breast feeding.  She has No signs of infection, bleeding or other complications.  We discussed contraceptions and she has chosen oral contraceptives.   Instrucitons and indications for backup were thoroughly rev.  All her ?'s were answered.  She  has not had intercourse since delivery and complains of No discomfort. Patient screened for postpartum depression and complaints are sleep disturbance. Screening has also been completed for intimate partner violence.  The patient also notices a pruritic macular rash over her left hip region has been unresponsive to 1% hydrocortisone cream.  No known allergen exposure no signs of infection or cellulitis    EXAM:    GENERAL APPEARANCE: healthy, alert and no distress     NECK: no adenopathy, no asymmetry, masses, or scars and thyroid normal to palpation     RESP: lungs clear to auscultation - no rales, rhonchi or wheezes     BREAST: normal without masses, tenderness or nipple discharge and no palpable axillary masses or adenopathy     CV: regular rates and rhythm, normal S1 S2, no S3 or S4 and no murmur, click or rub     ABDOMEN:  soft, nontender, no HSM or masses and bowel sounds normal     : normal cervix, adnexae, and uterus without masses or discharge and rectal exam normal without masses-guaiac negative stool     MS: extremities normal- no gross deformities noted, no evidence of inflammation in joints, FROM in all extremities.     PSYCH: mentation appears normal. and affect normal/bright     LYMPHATICS: No cervical adenopathy    ASSESSMENT:   Normal postpartum exam after   Induced vaginal delivery secondary to pre eclampsia  (GHTN with severe HA) .    PLAN:  Return as needed or at time of next expected pap, pelvic, or breast exam.  I left the prescription for the patient for triamcinolone 0.1% cream to be used 3 times daily for 5 days on the affected area.  She will not use it on her face.  I also left her prescription for Apri a low-dose birth control pill with instructions if the pill works well I left 3 refills if not she will let us know and appropriate changes will be made

## 2023-10-09 ENCOUNTER — OFFICE VISIT (OUTPATIENT)
Dept: MIDWIFE SERVICES | Facility: CLINIC | Age: 28
End: 2023-10-09
Payer: COMMERCIAL

## 2023-10-09 VITALS — SYSTOLIC BLOOD PRESSURE: 126 MMHG | BODY MASS INDEX: 30.9 KG/M2 | DIASTOLIC BLOOD PRESSURE: 82 MMHG | WEIGHT: 180 LBS

## 2023-10-09 DIAGNOSIS — Z30.430 ENCOUNTER FOR INSERTION OF INTRAUTERINE CONTRACEPTIVE DEVICE: Primary | ICD-10-CM

## 2023-10-09 PROCEDURE — 58300 INSERT INTRAUTERINE DEVICE: CPT | Performed by: ADVANCED PRACTICE MIDWIFE

## 2023-10-09 NOTE — NURSING NOTE
"Chief Complaint   Patient presents with    IUD     Mirena         Initial /82   Wt 81.6 kg (180 lb)   LMP 10/05/2023   Breastfeeding No   BMI 30.90 kg/m   Estimated body mass index is 30.9 kg/m  as calculated from the following:    Height as of 23: 1.626 m (5' 4\").    Weight as of this encounter: 81.6 kg (180 lb).  BP completed using cuff size: regular    Questioned patient about current smoking habits.  Pt. has never smoked.          Roxana Dolan LPN on 10/9/2023 at 2:31 PM               "

## 2023-10-09 NOTE — PROGRESS NOTES
IUD Insertion:  CONSULT:    Is a pregnancy test required: No.  Was a consent obtained?  Yes    Subjective: Echo Mcgovern is a 28 year old  presents for IUD and desires Mirena type IUD.    Patient has been given the opportunity to ask questions about all forms of birth control, including all options appropriate for Echo Mcgovern. Discussed that no method of birth control, except abstinence is 100% effective against pregnancy or sexually transmitted infection.     Echo Mcgovern understands she may have the IUD removed at any time. IUD should be removed by a health care provider.    The entire insertion procedure was reviewed with the patient, including care after placement.    Patient's last menstrual period was 10/05/2023. Has current contraception. No allergy to betadine or shellfish. Patient declines STD screening  HCG Qual Urine   Date Value Ref Range Status   2021 Negative NEG^Negative Final     Comment:     This test is for screening purposes.  Results should be interpreted along with   the clinical picture.  Confirmation testing is available if warranted by   ordering DEX852, HCG Quantitative Pregnancy.           /82   Wt 81.6 kg (180 lb)   LMP 10/05/2023   Breastfeeding No   BMI 30.90 kg/m      Pelvic Exam:   EG/BUS: normal genital architecture without lesions, erythema or abnormal secretions.   Vagina: moist, pink, rugae with physiologic discharge and secretions  Cervix: multi-parous no lesions and pink, moist, closed, without lesion or CMT  Uterus: mid-position, mobile, no pain  Adnexa: within normal limits and no masses, nodularity, tenderness    PROCEDURE NOTE: -- IUD Insertion    Reason for Insertion: contraception    Premedicated with ibuprofen.  Under sterile technique, cervix was visualized with speculum and prepped with Betadine solution swab x 3. Tenaculum was not required. The uterus was gently straightened and sounded to 6.5 cm. IUD prepared for placement, and IUD  inserted according to 's instructions without difficulty or significant resitance, and deployed at the fundus. The strings were visualized and trimmed to 3.0 cm from the external os. Tenaculum was removed and hemostasis noted. Speculum removed.  Patient tolerated procedure well.    Lot # per MAR  Exp: per MAR    EBL: minimal    Complications: none    ASSESSMENT:     ICD-10-CM    1. Encounter for insertion of intrauterine contraceptive device  Z30.430 levonorgestrel (MIRENA) 52 MG (20 mcg/day) IUD 1 each     INSERTION INTRAUTERINE DEVICE           PLAN:    Given 's handouts, including when to have IUD removed, list of danger s/sx, side effects and follow up recommended. Encouraged condom use for prevention of STD. Back up contraception advised for 7 days if progestin method. Advised to call for any fever, for prolonged or severe pain or bleeding, abnormal vaginal discharge, or unable to palpate strings. She was advised to use pain medications (ibuprofen) as needed for mild to moderate pain. Advised to follow-up in clinic in 4-6 weeks for IUD string check if unable to find strings or as directed by provider.     LYNNE YoderM

## 2024-01-23 ENCOUNTER — E-VISIT (OUTPATIENT)
Dept: URGENT CARE | Facility: CLINIC | Age: 29
End: 2024-01-23
Payer: COMMERCIAL

## 2024-01-23 DIAGNOSIS — J01.90 ACUTE SINUSITIS, RECURRENCE NOT SPECIFIED, UNSPECIFIED LOCATION: Primary | ICD-10-CM

## 2024-01-23 PROCEDURE — 99421 OL DIG E/M SVC 5-10 MIN: CPT | Performed by: PREVENTIVE MEDICINE

## 2024-01-23 NOTE — PATIENT INSTRUCTIONS
Acute Sinusitis: Care Instructions  Overview     Acute sinusitis is an inflammation of the mucous membranes inside the nose and sinuses. Sinuses are the hollow spaces in your skull around the eyes and nose. Acute sinusitis often follows a cold. Acute sinusitis causes thick, discolored mucus that drains from the nose or down the back of the throat. It also can cause pain and pressure in your head and face along with a stuffy or blocked nose.  In most cases, sinusitis gets better on its own in 1 to 2 weeks. But some mild symptoms may last for several weeks. Sometimes antibiotics are needed if there is a bacterial infection.  Follow-up care is a key part of your treatment and safety. Be sure to make and go to all appointments, and call your doctor if you are having problems. It's also a good idea to know your test results and keep a list of the medicines you take.  How can you care for yourself at home?  Use saline (saltwater) nasal washes. This can help keep your nasal passages open and wash out mucus and allergens.  You can buy saline nose washes at a grocery store or drugstore. Follow the instructions on the package.  You can make your own at home. Add 1 teaspoon of non-iodized salt and 1 teaspoon of baking soda to 2 cups of distilled or boiled and cooled water. Fill a squeeze bottle or a nasal cleansing pot (such as a neti pot) with the nasal wash. Then put the tip into your nostril, and lean over the sink. With your mouth open, gently squirt the liquid. Repeat on the other side.  Try a decongestant nasal spray like oxymetazoline (Afrin). Do not use it for more than 3 days in a row. Using it for more than 3 days can make your congestion worse.  If needed, take an over-the-counter pain medicine, such as acetaminophen (Tylenol), ibuprofen (Advil, Motrin), or naproxen (Aleve). Read and follow all instructions on the label.  If the doctor prescribed antibiotics, take them as directed. Do not stop taking them just  "because you feel better. You need to take the full course of antibiotics.  Be careful when taking over-the-counter cold or flu medicines and Tylenol at the same time. Many of these medicines have acetaminophen, which is Tylenol. Read the labels to make sure that you are not taking more than the recommended dose. Too much acetaminophen (Tylenol) can be harmful.  Try a steroid nasal spray. It may help with your symptoms.  Breathe warm, moist air. You can use a steamy shower, a hot bath, or a sink filled with hot water. Avoid cold, dry air. Using a humidifier in your home may help. Follow the directions for cleaning the machine.  When should you call for help?   Call your doctor now or seek immediate medical care if:    You have new or worse swelling, redness, or pain in your face or around one or both of your eyes.     You have double vision or a change in your vision.     You have a high fever.     You have a severe headache and a stiff neck.     You have mental changes, such as feeling confused or much less alert.   Watch closely for changes in your health, and be sure to contact your doctor if:    You are not getting better as expected.   Where can you learn more?  Go to https://www.[employer].net/patiented  Enter I933 in the search box to learn more about \"Acute Sinusitis: Care Instructions.\"  Current as of: February 28, 2023               Content Version: 13.8    8027-6887 [employer].   Care instructions adapted under license by your healthcare professional. If you have questions about a medical condition or this instruction, always ask your healthcare professional. [employer] disclaims any warranty or liability for your use of this information.      You may want to try a nasal lavage (also known as nasal irrigation). You can find over-the-counter products, such as Neti-Pot, at retail locations or make your own at home. Instructions for homemade nasal lavage and more information on the " process are available online at http://www.aafp.org/afp/2009/1115/p1121.html.    Dear Echo Mcgovern    After reviewing your responses, I've been able to diagnose you with Acute sinusitis, recurrence not specified, unspecified location.      Based on your responses and diagnosis, I have prescribed No orders of the defined types were placed in this encounter.   to treat your symptoms. I have sent this to your pharmacy.?     It is also important to stay well hydrated, get lots of rest and take over-the-counter decongestants,?tylenol?or ibuprofen if you?are able to?take those medications per your primary care provider to help relieve discomfort.?     It is important that you take?all of?your prescribed medication even if your symptoms are improving after a few doses.? Taking?all of?your medicine helps prevent the symptoms from returning.?     If your symptoms worsen, you develop severe headache, vomiting, high fever (>102), or are not improving in 7 days, please contact your primary care provider for an appointment or visit any of our convenient Walk-in Care or Urgent Care Centers to be seen which can be found on our website?here.?     Thanks again for choosing?us?as your health care partner,?   ?  Nathan Glass MD, MD?   Thank you for choosing us for your care. I have placed an order for a prescription so that you can start treatment. View your full visit summary for details by clicking on the link below. Your pharmacist will able to address any questions you may have about the medication.     If you're not feeling better within 5-7 days, please schedule an appointment.  You can schedule an appointment right here in [facility], or call [phone]  If the visit is for the same symptoms as your eVisit, we'll refund the cost of your eVisit if seen within seven days.

## 2024-07-14 ENCOUNTER — HEALTH MAINTENANCE LETTER (OUTPATIENT)
Age: 29
End: 2024-07-14

## 2024-12-28 ENCOUNTER — OFFICE VISIT (OUTPATIENT)
Dept: URGENT CARE | Facility: URGENT CARE | Age: 29
End: 2024-12-28
Payer: COMMERCIAL

## 2024-12-28 VITALS
TEMPERATURE: 97.9 F | HEART RATE: 78 BPM | BODY MASS INDEX: 30.55 KG/M2 | SYSTOLIC BLOOD PRESSURE: 132 MMHG | OXYGEN SATURATION: 99 % | DIASTOLIC BLOOD PRESSURE: 83 MMHG | WEIGHT: 178 LBS

## 2024-12-28 DIAGNOSIS — J20.9 ACUTE BRONCHITIS, UNSPECIFIED ORGANISM: Primary | ICD-10-CM

## 2024-12-28 DIAGNOSIS — R05.1 ACUTE COUGH: ICD-10-CM

## 2024-12-28 PROCEDURE — 99213 OFFICE O/P EST LOW 20 MIN: CPT | Performed by: FAMILY MEDICINE

## 2024-12-28 RX ORDER — BENZONATATE 100 MG/1
200 CAPSULE ORAL 3 TIMES DAILY PRN
Qty: 42 CAPSULE | Refills: 3 | Status: SHIPPED | OUTPATIENT
Start: 2024-12-28

## 2024-12-28 NOTE — PROGRESS NOTES
SUBJECTIVE:   Echo Mcgovern is a 29 year old non-smoking female presenting with a chief complaint of cough (worse at night, with cough productive of pale yellow-green phlegm) for the past five days and a burning sensation in the chest and lungs for the past three days.  The cough is worse when supine.  .  Onset of symptoms was four days ago.  Course of illness is worsening.    Severity dry-sounding cough.    Current and Associated symptoms: shortness of breath since yesterday.   No fevers.   No loss of smell/taste  No vomiting/diarrhea.   Treatment measures tried include none. .  Predisposing factors include her one-year-old child has a cough and nasal discharge.  Her other kids also have been coughing.  .    Her at-home COVID-19 test yesterday was negative.          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 Streptococcus     H/O pre-eclampsia in prior pregnancy, currently pregnant     Preeclampsia     with both pregnancy    Varicella     as a child    Wounds and injuries     back injury, broke hand falling off jet when in the      Current Outpatient Medications   Medication Sig Dispense Refill    desogestrel-ethinyl estradiol (APRI) 0.15-30 MG-MCG tablet Take 1 tablet by mouth daily 84 tablet 3    levonorgestrel (MIRENA) 52 MG (20 mcg/day) IUD 1 each by Intrauterine route once      Prenatal MV & Min w/FA-DHA ([facility])        Social History     Tobacco Use    Smoking status: Never     Passive exposure: Never    Smokeless tobacco: Never   Substance Use Topics    Alcohol use: No       ROS:  CONSTITUTIONAL:negative for fever  ENT/MOUTH:  positive for nasal discharge.   RESP: positive for cough.      OBJECTIVE:  VITALS:  /83 (BP Location: Right arm, Patient Position: Sitting, Cuff Size: Adult Regular)   Pulse 78   Temp 97.9  F (36.6  C) (Tympanic)   SpO2 99%       GENERAL APPEARANCE: healthy, alert and no  distress.  No respiratory distress.  Not much cough is present.    HENT: TMs are gray and mildly retracted.  Nose:  turbinates are edematous.  Mouth:  oropharynx and the rest of the mouth are within normal limits.    NECK: supple, nontender, no lymphadenopathy  RESP: lungs clear to auscultation - no rales, rhonchi or wheezes  CV: regular rates and rhythm, normal S1 S2, no murmur noted  SKIN: no pallor/cyanosis/diaphoresis.      ASSESSMENT:  Acute Bronchitis  Acute Cough    PLAN:  See orders in Epic  Rx:  Tessalon Perles    Over the counter cough syrup    Try to inhale steam or use a room humidifier.      Follow up if not better in 10 days.     Sadiq Edmond MD

## 2024-12-28 NOTE — PATIENT INSTRUCTIONS
You can take over the counter cough syrups    Try to inhale steam or use a room humidifier.      Follow up if not better in 10 days.

## 2025-01-07 ENCOUNTER — APPOINTMENT (OUTPATIENT)
Dept: GENERAL RADIOLOGY | Facility: CLINIC | Age: 30
End: 2025-01-07
Attending: STUDENT IN AN ORGANIZED HEALTH CARE EDUCATION/TRAINING PROGRAM
Payer: COMMERCIAL

## 2025-01-07 ENCOUNTER — HOSPITAL ENCOUNTER (EMERGENCY)
Facility: CLINIC | Age: 30
Discharge: HOME | End: 2025-01-07
Attending: STUDENT IN AN ORGANIZED HEALTH CARE EDUCATION/TRAINING PROGRAM
Payer: COMMERCIAL

## 2025-01-07 VITALS
SYSTOLIC BLOOD PRESSURE: 143 MMHG | TEMPERATURE: 98.2 F | DIASTOLIC BLOOD PRESSURE: 93 MMHG | HEART RATE: 80 BPM | RESPIRATION RATE: 18 BRPM | OXYGEN SATURATION: 100 %

## 2025-01-07 DIAGNOSIS — S93.402A SPRAIN OF LEFT ANKLE, UNSPECIFIED LIGAMENT, INITIAL ENCOUNTER: ICD-10-CM
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
## 2025-01-07 PROCEDURE — 99283 EMERGENCY DEPT VISIT LOW MDM: CPT | Performed by: STUDENT IN AN ORGANIZED HEALTH CARE EDUCATION/TRAINING PROGRAM

## 2025-01-07 PROCEDURE — 73610 X-RAY EXAM OF ANKLE: CPT | Mod: LT

## 2025-01-07 ASSESSMENT — COLUMBIA-SUICIDE SEVERITY RATING SCALE - C-SSRS
6. HAVE YOU EVER DONE ANYTHING, STARTED TO DO ANYTHING, OR PREPARED TO DO ANYTHING TO END YOUR LIFE?: NO
2. HAVE YOU ACTUALLY HAD ANY THOUGHTS OF KILLING YOURSELF IN THE PAST MONTH?: NO
1. IN THE PAST MONTH, HAVE YOU WISHED YOU WERE DEAD OR WISHED YOU COULD GO TO SLEEP AND NOT WAKE UP?: NO

## 2025-01-07 ASSESSMENT — ACTIVITIES OF DAILY LIVING (ADL)
ADLS_ACUITY_SCORE: 50
ADLS_ACUITY_SCORE: 50

## 2025-01-07 NOTE — DISCHARGE INSTRUCTIONS
You are seen in emergency department due to an ankle injury that related.  You had an x-ray that does not show any signs of any broken bones.  You do have an ankle sprain.  We would recommend that you treat this symptomatically using Tylenol and Motrin as needed for discomfort, ice for 10 to 15 minutes at a time and try to elevate your leg is much as possible while sitting.  Return to the emergency department with any severe worsening pain especially associated with numbness, tingling or weakness.  You are given a boot to wear    Recommend that you wear for the next couple of days.  If you continue to have discomfort with walking at 1 week, please follow-up with the sports medicine doctors.  You can use crutches as needed

## 2025-01-07 NOTE — Clinical Note
Echo Mcgovern was seen and treated in our emergency department on 1/7/2025.  She may return to work on 01/08/2025.  Patient can walk with the boot as needed, crutches no significant lifting, bending or walking long distances until able to be free of her boot with no discomfort     If you have any questions or concerns, please don't hesitate to call.      Mary Tobin MD

## 2025-01-07 NOTE — ED TRIAGE NOTES
Patient fell down three stairs while walking at work, slipped on water that was spilled on stairs. Landed face first, and hit left outside of ankle on bottom stair. Initially could not walk on foot, but pain subsided. No lose of feeling, tingling present.No other injuries from fall

## 2025-01-07 NOTE — ED PROVIDER NOTES
[facility] EMERGENCY DEPARTMENT ([facility])    1/07/25      ED PROVIDER NOTE   ED 9  History     Chief Complaint   Patient presents with    Fall     Fell at work       The history is provided by the patient and medical records.     Echo Mcgovern is a 29 year old female G3, P3 on Mirena IUD contraception who presents to the emergency department with injuries after mechanical slip and fall at work.  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  She was walking down the stairs when she slipped on water that was spilled on stairs and fell down three stair steps.  She landed face first and hit left outside of ankle on bottom stair.  No loss of consciousness with this.  Initially could not walk on foot, but pain subsided.  She denies any numbness, weakness or tingling.  No other injuries from fall.  He is not on anticoagulation.    Patient has a 17 month old child at home as well as to other children.    Past Medical History  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 Streptococcus     H/O pre-eclampsia in prior pregnancy, currently pregnant     Preeclampsia     with both pregnancy    Varicella     as a child    Wounds and injuries     back injury, broke hand falling off jet when in the      Past Surgical History:   Procedure Laterality Date    HAND SURGERY  05/2015    right hand- fractured hand, had 2 screws placed    KNEE SURGERY  07/2011    Torn ACL, Left knee    ORTHOPEDIC SURGERY  2015    [place]    TOOTH EXTRACTION       benzonatate (TESSALON) 100 MG capsule  desogestrel-ethinyl estradiol (APRI) 0.15-30 MG-MCG tablet  levonorgestrel (MIRENA) 52 MG (20 mcg/day) IUD  Prenatal MV & Min w/FA-DHA ([facility])      No Known Allergies  Family History  Family History   Adopted: Yes   Problem Relation Age of Onset    Family History Negative Mother     Bipolar Disorder Sister      No Known Problems Sister     No Known Problems Brother     Diabetes Type 1 Maternal Grandmother     Cerebrovascular Disease Maternal Grandfather     Diabetes Paternal Grandmother     Diabetes Paternal Grandfather     Breast Cancer No family hx of     Colon Cancer No family hx of      Social History   Social History     Tobacco Use    Smoking status: Never     Passive exposure: Never    Smokeless tobacco: Never   Vaping Use    Vaping status: Never Used   Substance Use Topics    Alcohol use: No    Drug use: No      A medically appropriate review of systems was performed with pertinent positives and negatives noted in the HPI, and all other systems negative.    Physical Exam   BP: (!) 143/93  Pulse: 80  Temp: 98.2  F (36.8  C)  Resp: 18  SpO2: 100 %    Physical Exam  GEN: Well appearing, non toxic, cooperative  HEENT: normocephalic and atraumatic, PERRLA, EOMI  CV: well-perfused, normal skin color for ethnicity  PULM: breathing comfortably, in no respiratory distress  ABD: nondistended  EXT: Full range of motion.  No edema.  Mild left lateral foot edema just inferior to the lateral malleolus, tenderness to palpation of the dorsal, anterior ankle with normal range of motion, normal flexion and extension, inversion and eversion  NEURO: awake, conversant, grossly normal bilateral upper and lower extremity strength & ROM   SKIN: No rashes, ecchymosis, or lacerations  PSYCH: Calm and cooperative, interactive     ED Course, Procedures, & Data      Procedures        Results for orders placed or performed during the hospital encounter of 01/07/25   XR Ankle Left 3 Views     Status: None    Narrative    EXAM: XR ANKLE LEFT G/E 3 VIEWS  LOCATION: [facility]  DATE: 1/7/2025    INDICATION: L ankle twist injury, dorsal lateral pain  COMPARISON: None.      Impression    IMPRESSION: The ankle is negative for fracture or disruption of ankle mortise. No soft tissue swelling. Accessory os  trigonum.     Medications - No data to display  Labs Ordered and Resulted from Time of ED Arrival to Time of ED Departure - No data to display  XR Ankle Left 3 Views   Final Result   IMPRESSION: The ankle is negative for fracture or disruption of ankle mortise. No soft tissue swelling. Accessory os trigonum.             Critical care was not performed.     Medical Decision Making  The patient's presentation was of moderate complexity (an acute complicated injury).    The patient's evaluation involved:  review of external note(s) from 2 sources (see separate area of note for details)  review of 2 test result(s) ordered prior to this encounter (see separate area of note for details)  ordering and/or review of 1 test(s) in this encounter (see separate area of note for details)    The patient's management necessitated only low risk treatment.    Assessment & Plan    Echo Mcgovern is a 29 year old female G3, P3 on Mirena IUD contraception who presents to the emergency department with injuries after mechanical slip and fall at work.     Noted to have tenderness to palpation as well as to the lateral malleolus.  With her normal range of motion I am reassured, however pending x-ray to evaluate for underlying fracture.  Likely ankle sprain    1:58 PM x-ray without any evidence of fracture.  She does have ongoing swelling and symptoms consistent with ankle sprain.  Will give her a boot, crutches and as needed follow-up at 1 week for ankle sprain with sports medicine    I have reviewed the nursing notes. I have reviewed the findings, diagnosis, plan and need for follow up with the patient.    New Prescriptions    No medications on file       Final diagnoses:   Sprain of left ankle, unspecified ligament, initial encounter   Work related injury     I, Beatriz Londono, am serving as a trained medical scribe to document services personally performed by Mary Tobin MD based on the provider's statements to me on January 7, 2025.   This document has been checked and approved by the attending provider.    I, Mary Tobin MD, was physically present and have reviewed and verified the accuracy of this note documented by Beatriz Londono, medical scribe.      Mary Tobin MD   [facility] EMERGENCY DEPARTMENT  1/7/2025        Mary Tobin MD  01/07/25 [id number]

## 2025-01-17 PROBLEM — S93.402A SPRAIN OF LEFT ANKLE, UNSPECIFIED LIGAMENT, INITIAL ENCOUNTER: Status: ACTIVE | Noted: 2025-01-17

## 2025-01-17 PROBLEM — M25.572 ACUTE LEFT ANKLE PAIN: Status: ACTIVE | Noted: 2025-01-17

## 2025-02-27 ENCOUNTER — VIRTUAL VISIT (OUTPATIENT)
Dept: URGENT CARE | Facility: CLINIC | Age: 30
End: 2025-02-27
Payer: COMMERCIAL

## 2025-02-27 DIAGNOSIS — J01.10 ACUTE NON-RECURRENT FRONTAL SINUSITIS: Primary | ICD-10-CM

## 2025-02-27 NOTE — PROGRESS NOTES
Echo is a 29 year old female who presents for a billable video visit.    ASSESSMENT/PLAN:  Diagnoses and all orders for this visit:    Acute non-recurrent frontal sinusitis  -     amoxicillin-clavulanate (AUGMENTIN) 875-125 MG tablet; Take 1 tablet by mouth 2 times daily for 7 days.    Patient instructions:  Take antibiotics as prescribed with food. Increase probiotics either through OTC medications or yogurts. Take antibiotics with food to decrease chance of GI upset. If no improvement or worsening symptoms please follow up with PCP or higher level of care.      Medical decision making:  Pt presents today with sinus complaints x 14 days. No improvement with over the counter medications. Ddx include viral sinus sinusitis, URI, bacterial sinusitis, AOM, tension headache among others. Based on symptoms will tx for bacterial sinusitis with augmentin. In the mean time instructed patient to use Flonase, zyrtec, and tylenol/ibuprofen for symptoms. If no improvement in 2-3 days return or follow up with PCP. ED for worsening symptoms.       SUBJECTIVE: Pt reports 2 weeks of cold symptoms, now having facial pain and sinus pressure. Has tried variety of over the counter medications without much relief.       ROS: Pertinent ROS neg other than the symptoms noted above in the HPI.     OBJECTIVE:  Vitals not done due to this being a virtual visit    GENERAL: healthy, alert and no distress  EYES: Eyes grossly normal to inspection,conjunctivae and sclerae normal  RESP: Able to speak in complete sentences, no audible wheeze or cough  SKIN: no suspicious lesions or rashes  NEURO: mentation intact and speech normal  PSYCH: mentation appears normal, affect normal/bright    Video-Visit Details    Type of service:  Video Visit  Video Start Time: 1218  Video End Time: 1221    Originating Location: Home    Distant Location:  [facility] VIRTUAL URGENT CARE     Platform used for Video Visit: LYNNE Guillen  CNP

## 2025-05-07 PROBLEM — S93.402A SPRAIN OF LEFT ANKLE, UNSPECIFIED LIGAMENT, INITIAL ENCOUNTER: Status: RESOLVED | Noted: 2025-01-17 | Resolved: 2025-05-07

## 2025-05-07 PROBLEM — M25.572 ACUTE LEFT ANKLE PAIN: Status: RESOLVED | Noted: 2025-01-17 | Resolved: 2025-05-07

## 2025-07-19 ENCOUNTER — HEALTH MAINTENANCE LETTER (OUTPATIENT)
Age: 30
End: 2025-07-19